# Patient Record
Sex: FEMALE | Race: WHITE
[De-identification: names, ages, dates, MRNs, and addresses within clinical notes are randomized per-mention and may not be internally consistent; named-entity substitution may affect disease eponyms.]

---

## 2017-01-24 ENCOUNTER — HOSPITAL ENCOUNTER (INPATIENT)
Dept: HOSPITAL 71 - OSEC | Age: 72
LOS: 3 days | Discharge: HOME HEALTH | DRG: 517 | End: 2017-01-27
Attending: INTERNAL MEDICINE | Admitting: INTERNAL MEDICINE
Payer: COMMERCIAL

## 2017-01-24 VITALS — RESPIRATION RATE: 18 BRPM | SYSTOLIC BLOOD PRESSURE: 160 MMHG

## 2017-01-24 VITALS — TEMPERATURE: 97.8 F | SYSTOLIC BLOOD PRESSURE: 133 MMHG | RESPIRATION RATE: 16 BRPM

## 2017-01-24 VITALS — SYSTOLIC BLOOD PRESSURE: 140 MMHG | RESPIRATION RATE: 16 BRPM | TEMPERATURE: 97.9 F

## 2017-01-24 VITALS — SYSTOLIC BLOOD PRESSURE: 165 MMHG | RESPIRATION RATE: 20 BRPM

## 2017-01-24 VITALS — RESPIRATION RATE: 18 BRPM | TEMPERATURE: 97.8 F | SYSTOLIC BLOOD PRESSURE: 156 MMHG

## 2017-01-24 VITALS — TEMPERATURE: 97.4 F | RESPIRATION RATE: 18 BRPM | SYSTOLIC BLOOD PRESSURE: 150 MMHG

## 2017-01-24 VITALS — SYSTOLIC BLOOD PRESSURE: 148 MMHG | TEMPERATURE: 97.9 F | RESPIRATION RATE: 16 BRPM

## 2017-01-24 VITALS
WEIGHT: 100 LBS | WEIGHT: 100 LBS | BODY MASS INDEX: 18.4 KG/M2 | BODY MASS INDEX: 18.4 KG/M2 | HEIGHT: 62 IN | HEIGHT: 62 IN

## 2017-01-24 VITALS — RESPIRATION RATE: 20 BRPM | SYSTOLIC BLOOD PRESSURE: 154 MMHG | TEMPERATURE: 99.6 F

## 2017-01-24 VITALS — TEMPERATURE: 99 F | RESPIRATION RATE: 16 BRPM | SYSTOLIC BLOOD PRESSURE: 133 MMHG

## 2017-01-24 VITALS — RESPIRATION RATE: 20 BRPM | SYSTOLIC BLOOD PRESSURE: 166 MMHG

## 2017-01-24 VITALS — SYSTOLIC BLOOD PRESSURE: 160 MMHG | RESPIRATION RATE: 16 BRPM

## 2017-01-24 VITALS — RESPIRATION RATE: 16 BRPM | SYSTOLIC BLOOD PRESSURE: 163 MMHG

## 2017-01-24 VITALS — SYSTOLIC BLOOD PRESSURE: 157 MMHG | RESPIRATION RATE: 20 BRPM

## 2017-01-24 VITALS — SYSTOLIC BLOOD PRESSURE: 138 MMHG | RESPIRATION RATE: 16 BRPM | TEMPERATURE: 98.3 F

## 2017-01-24 VITALS — RESPIRATION RATE: 16 BRPM

## 2017-01-24 VITALS — SYSTOLIC BLOOD PRESSURE: 133 MMHG | RESPIRATION RATE: 18 BRPM | TEMPERATURE: 98.4 F

## 2017-01-24 DIAGNOSIS — F17.210: ICD-10-CM

## 2017-01-24 DIAGNOSIS — J44.9: ICD-10-CM

## 2017-01-24 DIAGNOSIS — I10: ICD-10-CM

## 2017-01-24 DIAGNOSIS — R26.2: ICD-10-CM

## 2017-01-24 DIAGNOSIS — S82.002A: Primary | ICD-10-CM
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
## 2017-01-24 PROCEDURE — 80053 COMPREHEN METABOLIC PANEL: CPT

## 2017-01-24 PROCEDURE — 85025 COMPLETE CBC W/AUTO DIFF WBC: CPT

## 2017-01-24 PROCEDURE — 94799 UNLISTED PULMONARY SVC/PX: CPT

## 2017-01-24 PROCEDURE — 36415 COLL VENOUS BLD VENIPUNCTURE: CPT

## 2017-01-24 PROCEDURE — 0QSF04Z REPOSITION LEFT PATELLA WITH INTERNAL FIXATION DEVICE, OPEN APPROACH: ICD-10-PCS

## 2017-01-24 PROCEDURE — 76000 FLUOROSCOPY <1 HR PHYS/QHP: CPT

## 2017-01-24 PROCEDURE — 94762 N-INVAS EAR/PLS OXIMTRY CONT: CPT

## 2017-01-24 PROCEDURE — 94640 AIRWAY INHALATION TREATMENT: CPT

## 2017-01-24 RX ADMIN — MULTIPLE VITAMINS W/ MINERALS TAB SCH TAB: TAB at 14:07

## 2017-01-24 RX ADMIN — GUAIFENESIN AND DEXTROMETHORPHAN HYDROBROMIDE SCH EA: 600; 30 TABLET, EXTENDED RELEASE ORAL at 20:08

## 2017-01-24 RX ADMIN — Medication SCH ML: at 19:57

## 2017-01-24 RX ADMIN — FAMOTIDINE SCH MG: 20 TABLET ORAL at 20:08

## 2017-01-24 RX ADMIN — LEVALBUTEROL HYDROCHLORIDE SCH MG: 0.63 SOLUTION RESPIRATORY (INHALATION) at 22:16

## 2017-01-24 RX ADMIN — MORPHINE SULFATE PRN MG: 2 INJECTION, SOLUTION INTRAMUSCULAR; INTRAVENOUS at 17:22

## 2017-01-24 RX ADMIN — LEVALBUTEROL HYDROCHLORIDE SCH MG: 0.63 SOLUTION RESPIRATORY (INHALATION) at 20:40

## 2017-01-24 RX ADMIN — MORPHINE SULFATE PRN MG: 2 INJECTION, SOLUTION INTRAMUSCULAR; INTRAVENOUS at 23:06

## 2017-01-24 RX ADMIN — HYDROMORPHONE HYDROCHLORIDE PRN MG: 1 INJECTION, SOLUTION INTRAMUSCULAR; INTRAVENOUS; SUBCUTANEOUS at 13:28

## 2017-01-24 RX ADMIN — HYDROMORPHONE HYDROCHLORIDE PRN MG: 1 INJECTION, SOLUTION INTRAMUSCULAR; INTRAVENOUS; SUBCUTANEOUS at 13:46

## 2017-01-24 RX ADMIN — CEFAZOLIN SCH MLS/HR: 10 INJECTION, POWDER, FOR SOLUTION INTRAVENOUS; PARENTERAL at 23:05

## 2017-01-24 RX ADMIN — STANDARDIZED SENNA CONCENTRATE SCH MG: 8.6 TABLET ORAL at 20:08

## 2017-01-24 RX ADMIN — CEFAZOLIN SCH MLS/HR: 10 INJECTION, POWDER, FOR SOLUTION INTRAVENOUS; PARENTERAL at 17:40

## 2017-01-24 RX ADMIN — DOCUSATE SODIUM SCH MG: 100 CAPSULE, LIQUID FILLED ORAL at 20:08

## 2017-01-25 VITALS — RESPIRATION RATE: 16 BRPM | TEMPERATURE: 98.2 F | SYSTOLIC BLOOD PRESSURE: 116 MMHG

## 2017-01-25 VITALS — TEMPERATURE: 98.5 F | SYSTOLIC BLOOD PRESSURE: 119 MMHG | RESPIRATION RATE: 16 BRPM

## 2017-01-25 VITALS — TEMPERATURE: 98.1 F | RESPIRATION RATE: 16 BRPM | SYSTOLIC BLOOD PRESSURE: 123 MMHG

## 2017-01-25 VITALS — TEMPERATURE: 97.5 F | RESPIRATION RATE: 16 BRPM | SYSTOLIC BLOOD PRESSURE: 114 MMHG

## 2017-01-25 VITALS — RESPIRATION RATE: 18 BRPM | SYSTOLIC BLOOD PRESSURE: 129 MMHG | TEMPERATURE: 97.8 F

## 2017-01-25 VITALS — TEMPERATURE: 97.9 F | RESPIRATION RATE: 16 BRPM | SYSTOLIC BLOOD PRESSURE: 134 MMHG

## 2017-01-25 RX ADMIN — LEVALBUTEROL HYDROCHLORIDE SCH MG: 0.63 SOLUTION RESPIRATORY (INHALATION) at 15:26

## 2017-01-25 RX ADMIN — MAGNESIUM HYDROXIDE SCH ML: 400 SUSPENSION ORAL at 20:00

## 2017-01-25 RX ADMIN — MULTIPLE VITAMINS W/ MINERALS TAB SCH TAB: TAB at 09:14

## 2017-01-25 RX ADMIN — SODIUM CHLORIDE SCH MLS/HR: 9 INJECTION, SOLUTION INTRAVENOUS at 05:19

## 2017-01-25 RX ADMIN — STANDARDIZED SENNA CONCENTRATE SCH MG: 8.6 TABLET ORAL at 09:14

## 2017-01-25 RX ADMIN — ENOXAPARIN SODIUM SCH MG: 30 INJECTION SUBCUTANEOUS at 05:36

## 2017-01-25 RX ADMIN — DOCUSATE SODIUM SCH MG: 100 CAPSULE, LIQUID FILLED ORAL at 09:14

## 2017-01-25 RX ADMIN — STANDARDIZED SENNA CONCENTRATE SCH MG: 8.6 TABLET ORAL at 20:29

## 2017-01-25 RX ADMIN — Medication SCH ML: at 20:29

## 2017-01-25 RX ADMIN — POLYETHYLENE GLYCOL 3350 SCH GM: 17 POWDER, FOR SOLUTION ORAL at 09:13

## 2017-01-25 RX ADMIN — CEFAZOLIN SCH MLS/HR: 10 INJECTION, POWDER, FOR SOLUTION INTRAVENOUS; PARENTERAL at 11:32

## 2017-01-25 RX ADMIN — FAMOTIDINE SCH MG: 20 TABLET ORAL at 09:13

## 2017-01-25 RX ADMIN — GUAIFENESIN AND DEXTROMETHORPHAN HYDROBROMIDE SCH EA: 600; 30 TABLET, EXTENDED RELEASE ORAL at 20:29

## 2017-01-25 RX ADMIN — Medication SCH ML: at 09:15

## 2017-01-25 RX ADMIN — LEVALBUTEROL HYDROCHLORIDE SCH MG: 0.63 SOLUTION RESPIRATORY (INHALATION) at 06:59

## 2017-01-25 RX ADMIN — CEFAZOLIN SCH MLS/HR: 10 INJECTION, POWDER, FOR SOLUTION INTRAVENOUS; PARENTERAL at 05:20

## 2017-01-25 RX ADMIN — MAGNESIUM HYDROXIDE SCH ML: 400 SUSPENSION ORAL at 09:13

## 2017-01-25 RX ADMIN — LEVALBUTEROL HYDROCHLORIDE SCH MG: 0.63 SOLUTION RESPIRATORY (INHALATION) at 19:41

## 2017-01-25 RX ADMIN — DOCUSATE SODIUM SCH MG: 100 CAPSULE, LIQUID FILLED ORAL at 20:29

## 2017-01-25 RX ADMIN — GUAIFENESIN AND DEXTROMETHORPHAN HYDROBROMIDE SCH EA: 600; 30 TABLET, EXTENDED RELEASE ORAL at 09:13

## 2017-01-25 RX ADMIN — FAMOTIDINE SCH MG: 20 TABLET ORAL at 20:29

## 2017-01-26 VITALS — RESPIRATION RATE: 18 BRPM | TEMPERATURE: 98.3 F | SYSTOLIC BLOOD PRESSURE: 155 MMHG

## 2017-01-26 VITALS — RESPIRATION RATE: 18 BRPM | TEMPERATURE: 97.6 F | SYSTOLIC BLOOD PRESSURE: 145 MMHG

## 2017-01-26 VITALS — RESPIRATION RATE: 18 BRPM | TEMPERATURE: 98.4 F | SYSTOLIC BLOOD PRESSURE: 120 MMHG

## 2017-01-26 VITALS — RESPIRATION RATE: 18 BRPM | TEMPERATURE: 98.3 F | SYSTOLIC BLOOD PRESSURE: 137 MMHG

## 2017-01-26 VITALS — SYSTOLIC BLOOD PRESSURE: 134 MMHG | TEMPERATURE: 98.4 F | RESPIRATION RATE: 18 BRPM

## 2017-01-26 VITALS — SYSTOLIC BLOOD PRESSURE: 131 MMHG | TEMPERATURE: 98.2 F | RESPIRATION RATE: 16 BRPM

## 2017-01-26 RX ADMIN — GUAIFENESIN AND DEXTROMETHORPHAN HYDROBROMIDE SCH EA: 600; 30 TABLET, EXTENDED RELEASE ORAL at 19:51

## 2017-01-26 RX ADMIN — POLYETHYLENE GLYCOL 3350 SCH GM: 17 POWDER, FOR SOLUTION ORAL at 08:02

## 2017-01-26 RX ADMIN — Medication SCH ML: at 19:52

## 2017-01-26 RX ADMIN — LEVALBUTEROL HYDROCHLORIDE SCH MG: 0.63 SOLUTION RESPIRATORY (INHALATION) at 00:50

## 2017-01-26 RX ADMIN — FAMOTIDINE SCH MG: 20 TABLET ORAL at 08:02

## 2017-01-26 RX ADMIN — FAMOTIDINE SCH MG: 20 TABLET ORAL at 19:51

## 2017-01-26 RX ADMIN — SODIUM CHLORIDE SCH MLS/HR: 9 INJECTION, SOLUTION INTRAVENOUS at 06:00

## 2017-01-26 RX ADMIN — LEVALBUTEROL HYDROCHLORIDE SCH MG: 0.63 SOLUTION RESPIRATORY (INHALATION) at 23:00

## 2017-01-26 RX ADMIN — LEVALBUTEROL HYDROCHLORIDE SCH MG: 0.63 SOLUTION RESPIRATORY (INHALATION) at 06:15

## 2017-01-26 RX ADMIN — STANDARDIZED SENNA CONCENTRATE SCH MG: 8.6 TABLET ORAL at 19:52

## 2017-01-26 RX ADMIN — DOCUSATE SODIUM SCH MG: 100 CAPSULE, LIQUID FILLED ORAL at 19:52

## 2017-01-26 RX ADMIN — GUAIFENESIN AND DEXTROMETHORPHAN HYDROBROMIDE SCH EA: 600; 30 TABLET, EXTENDED RELEASE ORAL at 08:02

## 2017-01-26 RX ADMIN — MULTIPLE VITAMINS W/ MINERALS TAB SCH TAB: TAB at 08:02

## 2017-01-26 RX ADMIN — ENOXAPARIN SODIUM SCH MG: 30 INJECTION SUBCUTANEOUS at 06:23

## 2017-01-26 RX ADMIN — MAGNESIUM HYDROXIDE SCH ML: 400 SUSPENSION ORAL at 08:02

## 2017-01-26 RX ADMIN — LEVALBUTEROL HYDROCHLORIDE SCH MG: 0.63 SOLUTION RESPIRATORY (INHALATION) at 19:49

## 2017-01-26 RX ADMIN — STANDARDIZED SENNA CONCENTRATE SCH MG: 8.6 TABLET ORAL at 08:02

## 2017-01-26 RX ADMIN — MAGNESIUM HYDROXIDE SCH ML: 400 SUSPENSION ORAL at 08:04

## 2017-01-26 RX ADMIN — Medication SCH ML: at 08:04

## 2017-01-26 RX ADMIN — DOCUSATE SODIUM SCH MG: 100 CAPSULE, LIQUID FILLED ORAL at 08:02

## 2017-01-26 RX ADMIN — MAGNESIUM HYDROXIDE SCH ML: 400 SUSPENSION ORAL at 19:43

## 2017-01-26 RX ADMIN — LEVALBUTEROL HYDROCHLORIDE SCH MG: 0.63 SOLUTION RESPIRATORY (INHALATION) at 15:35

## 2017-01-27 VITALS — RESPIRATION RATE: 18 BRPM | SYSTOLIC BLOOD PRESSURE: 145 MMHG | TEMPERATURE: 98.6 F

## 2017-01-27 VITALS — SYSTOLIC BLOOD PRESSURE: 135 MMHG | RESPIRATION RATE: 18 BRPM | TEMPERATURE: 98.3 F

## 2017-01-27 VITALS — SYSTOLIC BLOOD PRESSURE: 140 MMHG | TEMPERATURE: 98 F | RESPIRATION RATE: 18 BRPM

## 2017-01-27 VITALS — TEMPERATURE: 98.3 F | SYSTOLIC BLOOD PRESSURE: 119 MMHG | RESPIRATION RATE: 18 BRPM

## 2017-01-27 RX ADMIN — DOCUSATE SODIUM SCH MG: 100 CAPSULE, LIQUID FILLED ORAL at 08:08

## 2017-01-27 RX ADMIN — Medication SCH ML: at 08:09

## 2017-01-27 RX ADMIN — FAMOTIDINE SCH MG: 20 TABLET ORAL at 08:08

## 2017-01-27 RX ADMIN — LEVALBUTEROL HYDROCHLORIDE SCH MG: 0.63 SOLUTION RESPIRATORY (INHALATION) at 15:00

## 2017-01-27 RX ADMIN — SODIUM CHLORIDE SCH MLS/HR: 9 INJECTION, SOLUTION INTRAVENOUS at 05:03

## 2017-01-27 RX ADMIN — MAGNESIUM HYDROXIDE SCH ML: 400 SUSPENSION ORAL at 08:00

## 2017-01-27 RX ADMIN — MAGNESIUM HYDROXIDE SCH ML: 400 SUSPENSION ORAL at 08:09

## 2017-01-27 RX ADMIN — STANDARDIZED SENNA CONCENTRATE SCH MG: 8.6 TABLET ORAL at 08:08

## 2017-01-27 RX ADMIN — LEVALBUTEROL HYDROCHLORIDE SCH MG: 0.63 SOLUTION RESPIRATORY (INHALATION) at 06:42

## 2017-01-27 RX ADMIN — POLYETHYLENE GLYCOL 3350 SCH GM: 17 POWDER, FOR SOLUTION ORAL at 08:08

## 2017-01-27 RX ADMIN — MULTIPLE VITAMINS W/ MINERALS TAB SCH TAB: TAB at 08:08

## 2017-01-27 RX ADMIN — GUAIFENESIN AND DEXTROMETHORPHAN HYDROBROMIDE SCH EA: 600; 30 TABLET, EXTENDED RELEASE ORAL at 08:08

## 2017-01-27 RX ADMIN — ENOXAPARIN SODIUM SCH MG: 30 INJECTION SUBCUTANEOUS at 05:07

## 2019-03-27 ENCOUNTER — HOSPITAL ENCOUNTER (OUTPATIENT)
Dept: OTHER | Facility: HOSPITAL | Age: 74
Discharge: HOME | End: 2019-03-27
Attending: INTERNAL MEDICINE

## 2019-03-27 LAB
ALBUMIN SERPL-MCNC: 4.8 G/DL (ref 3.5–5)
ALBUMIN/GLOB SERPL: 2.3 {RATIO} (ref 1.4–2.6)
ALP SERPL-CCNC: 82 U/L (ref 43–160)
ALT SERPL-CCNC: 13 U/L (ref 10–40)
ANION GAP SERPL CALC-SCNC: 18 MMOL/L (ref 8–19)
AST SERPL-CCNC: 25 U/L (ref 15–50)
BILIRUB SERPL-MCNC: 0.44 MG/DL (ref 0.2–1.3)
BUN SERPL-MCNC: 9 MG/DL (ref 5–25)
BUN/CREAT SERPL: 10 {RATIO} (ref 6–20)
CALCIUM SERPL-MCNC: 9.3 MG/DL (ref 8.7–10.4)
CHLORIDE SERPL-SCNC: 104 MMOL/L (ref 99–111)
CK SERPL-CCNC: 47 U/L (ref 35–230)
CONV CO2: 25 MMOL/L (ref 22–32)
CONV TOTAL PROTEIN: 6.9 G/DL (ref 6.3–8.2)
CREAT UR-MCNC: 0.93 MG/DL (ref 0.5–0.9)
GFR SERPLBLD BASED ON 1.73 SQ M-ARVRAT: >60 ML/MIN/{1.73_M2}
GLOBULIN UR ELPH-MCNC: 2.1 G/DL (ref 2–3.5)
GLUCOSE SERPL-MCNC: 105 MG/DL (ref 65–99)
OSMOLALITY SERPL CALC.SUM OF ELEC: 295 MOSM/KG (ref 273–304)
POTASSIUM SERPL-SCNC: 4.3 MMOL/L (ref 3.5–5.3)
SODIUM SERPL-SCNC: 143 MMOL/L (ref 135–147)

## 2022-05-16 ENCOUNTER — TRANSCRIBE ORDERS (OUTPATIENT)
Dept: ADMINISTRATIVE | Facility: HOSPITAL | Age: 77
End: 2022-05-16

## 2022-05-16 DIAGNOSIS — M81.0 SENILE OSTEOPOROSIS: Primary | ICD-10-CM

## 2022-09-02 ENCOUNTER — APPOINTMENT (OUTPATIENT)
Dept: BONE DENSITY | Facility: HOSPITAL | Age: 77
End: 2022-09-02

## 2025-04-08 ENCOUNTER — HOSPITAL ENCOUNTER (INPATIENT)
Facility: HOSPITAL | Age: 80
LOS: 2 days | Discharge: HOME | End: 2025-04-10
Attending: EMERGENCY MEDICINE | Admitting: INTERNAL MEDICINE
Payer: MEDICARE

## 2025-04-08 ENCOUNTER — APPOINTMENT (OUTPATIENT)
Dept: GENERAL RADIOLOGY | Facility: HOSPITAL | Age: 80
End: 2025-04-08
Payer: MEDICARE

## 2025-04-08 DIAGNOSIS — I21.3 ST ELEVATION MYOCARDIAL INFARCTION (STEMI), UNSPECIFIED ARTERY: Primary | ICD-10-CM

## 2025-04-08 LAB
ACT BLD: 429 SECONDS (ref 89–137)
ALBUMIN SERPL-MCNC: 4.3 G/DL (ref 3.5–5.2)
ALBUMIN/GLOB SERPL: 1.7 G/DL
ALP SERPL-CCNC: 77 U/L (ref 39–117)
ALT SERPL W P-5'-P-CCNC: 13 U/L (ref 1–33)
ANION GAP SERPL CALCULATED.3IONS-SCNC: 13.8 MMOL/L (ref 5–15)
AST SERPL-CCNC: 25 U/L (ref 1–32)
BASOPHILS # BLD AUTO: 0.03 10*3/MM3 (ref 0–0.2)
BASOPHILS NFR BLD AUTO: 0.4 % (ref 0–1.5)
BILIRUB SERPL-MCNC: 0.5 MG/DL (ref 0–1.2)
BUN SERPL-MCNC: 14 MG/DL (ref 8–23)
BUN/CREAT SERPL: 18.4 (ref 7–25)
CALCIUM SPEC-SCNC: 9.3 MG/DL (ref 8.6–10.5)
CHLORIDE SERPL-SCNC: 102 MMOL/L (ref 98–107)
CO2 SERPL-SCNC: 22.2 MMOL/L (ref 22–29)
CREAT SERPL-MCNC: 0.76 MG/DL (ref 0.57–1)
DEPRECATED RDW RBC AUTO: 53.7 FL (ref 37–54)
EGFRCR SERPLBLD CKD-EPI 2021: 78.8 ML/MIN/1.73
EOSINOPHIL # BLD AUTO: 0.08 10*3/MM3 (ref 0–0.4)
EOSINOPHIL NFR BLD AUTO: 1.1 % (ref 0.3–6.2)
ERYTHROCYTE [DISTWIDTH] IN BLOOD BY AUTOMATED COUNT: 14.7 % (ref 12.3–15.4)
GEN 5 1HR TROPONIN T REFLEX: 383 NG/L
GLOBULIN UR ELPH-MCNC: 2.6 GM/DL
GLUCOSE SERPL-MCNC: 131 MG/DL (ref 65–99)
HBA1C MFR BLD: 5.6 % (ref 4.8–5.6)
HCT VFR BLD AUTO: 43.1 % (ref 34–46.6)
HGB BLD-MCNC: 14 G/DL (ref 12–15.9)
HOLD SPECIMEN: NORMAL
HOLD SPECIMEN: NORMAL
IMM GRANULOCYTES # BLD AUTO: 0.03 10*3/MM3 (ref 0–0.05)
IMM GRANULOCYTES NFR BLD AUTO: 0.4 % (ref 0–0.5)
LIPASE SERPL-CCNC: 25 U/L (ref 13–60)
LYMPHOCYTES # BLD AUTO: 2.19 10*3/MM3 (ref 0.7–3.1)
LYMPHOCYTES NFR BLD AUTO: 29 % (ref 19.6–45.3)
MAGNESIUM SERPL-MCNC: 2 MG/DL (ref 1.6–2.4)
MCH RBC QN AUTO: 32 PG (ref 26.6–33)
MCHC RBC AUTO-ENTMCNC: 32.5 G/DL (ref 31.5–35.7)
MCV RBC AUTO: 98.4 FL (ref 79–97)
MONOCYTES # BLD AUTO: 0.75 10*3/MM3 (ref 0.1–0.9)
MONOCYTES NFR BLD AUTO: 9.9 % (ref 5–12)
NEUTROPHILS NFR BLD AUTO: 4.48 10*3/MM3 (ref 1.7–7)
NEUTROPHILS NFR BLD AUTO: 59.2 % (ref 42.7–76)
NRBC BLD AUTO-RTO: 0 /100 WBC (ref 0–0.2)
NT-PROBNP SERPL-MCNC: 961.3 PG/ML (ref 0–1800)
PLATELET # BLD AUTO: 216 10*3/MM3 (ref 140–450)
PMV BLD AUTO: 10.6 FL (ref 6–12)
POTASSIUM SERPL-SCNC: 3.9 MMOL/L (ref 3.5–5.2)
PROT SERPL-MCNC: 6.9 G/DL (ref 6–8.5)
QT INTERVAL: 391 MS
QT INTERVAL: 446 MS
QTC INTERVAL: 410 MS
QTC INTERVAL: 504 MS
RBC # BLD AUTO: 4.38 10*6/MM3 (ref 3.77–5.28)
SODIUM SERPL-SCNC: 138 MMOL/L (ref 136–145)
T-UPTAKE NFR SERPL: 1.14 TBI (ref 0.8–1.3)
T4 SERPL-MCNC: 8.31 MCG/DL (ref 4.5–11.7)
TROPONIN T % DELTA: 195
TROPONIN T NUMERIC DELTA: 253 NG/L
TROPONIN T SERPL HS-MCNC: 130 NG/L
TSH SERPL DL<=0.05 MIU/L-ACNC: 1.36 UIU/ML (ref 0.27–4.2)
WBC NRBC COR # BLD AUTO: 7.56 10*3/MM3 (ref 3.4–10.8)
WHOLE BLOOD HOLD COAG: NORMAL
WHOLE BLOOD HOLD SPECIMEN: NORMAL

## 2025-04-08 PROCEDURE — 4A023N7 MEASUREMENT OF CARDIAC SAMPLING AND PRESSURE, LEFT HEART, PERCUTANEOUS APPROACH: ICD-10-PCS | Performed by: INTERNAL MEDICINE

## 2025-04-08 PROCEDURE — 99222 1ST HOSP IP/OBS MODERATE 55: CPT | Performed by: INTERNAL MEDICINE

## 2025-04-08 PROCEDURE — C1894 INTRO/SHEATH, NON-LASER: HCPCS | Performed by: INTERNAL MEDICINE

## 2025-04-08 PROCEDURE — 94799 UNLISTED PULMONARY SVC/PX: CPT

## 2025-04-08 PROCEDURE — C1887 CATHETER, GUIDING: HCPCS | Performed by: INTERNAL MEDICINE

## 2025-04-08 PROCEDURE — C1725 CATH, TRANSLUMIN NON-LASER: HCPCS | Performed by: INTERNAL MEDICINE

## 2025-04-08 PROCEDURE — 93458 L HRT ARTERY/VENTRICLE ANGIO: CPT | Performed by: INTERNAL MEDICINE

## 2025-04-08 PROCEDURE — 85347 COAGULATION TIME ACTIVATED: CPT

## 2025-04-08 PROCEDURE — 36415 COLL VENOUS BLD VENIPUNCTURE: CPT

## 2025-04-08 PROCEDURE — C9606 PERC D-E COR REVASC W AMI S: HCPCS | Performed by: INTERNAL MEDICINE

## 2025-04-08 PROCEDURE — 99291 CRITICAL CARE FIRST HOUR: CPT | Performed by: INTERNAL MEDICINE

## 2025-04-08 PROCEDURE — 25010000002 FENTANYL CITRATE (PF) 50 MCG/ML SOLUTION: Performed by: EMERGENCY MEDICINE

## 2025-04-08 PROCEDURE — 25010000002 HEPARIN (PORCINE) PER 1000 UNITS: Performed by: EMERGENCY MEDICINE

## 2025-04-08 PROCEDURE — 25010000002 MIDAZOLAM PER 1MG: Performed by: INTERNAL MEDICINE

## 2025-04-08 PROCEDURE — 99152 MOD SED SAME PHYS/QHP 5/>YRS: CPT | Performed by: INTERNAL MEDICINE

## 2025-04-08 PROCEDURE — 25010000002 FENTANYL CITRATE (PF) 50 MCG/ML SOLUTION: Performed by: INTERNAL MEDICINE

## 2025-04-08 PROCEDURE — C1769 GUIDE WIRE: HCPCS | Performed by: INTERNAL MEDICINE

## 2025-04-08 PROCEDURE — 93005 ELECTROCARDIOGRAM TRACING: CPT | Performed by: INTERNAL MEDICINE

## 2025-04-08 PROCEDURE — 83690 ASSAY OF LIPASE: CPT | Performed by: EMERGENCY MEDICINE

## 2025-04-08 PROCEDURE — 25010000002 ONDANSETRON PER 1 MG: Performed by: EMERGENCY MEDICINE

## 2025-04-08 PROCEDURE — C1874 STENT, COATED/COV W/DEL SYS: HCPCS | Performed by: INTERNAL MEDICINE

## 2025-04-08 PROCEDURE — 93005 ELECTROCARDIOGRAM TRACING: CPT | Performed by: EMERGENCY MEDICINE

## 2025-04-08 PROCEDURE — 84484 ASSAY OF TROPONIN QUANT: CPT | Performed by: EMERGENCY MEDICINE

## 2025-04-08 PROCEDURE — 25010000002 FENTANYL CITRATE (PF) 100 MCG/2ML SOLUTION: Performed by: INTERNAL MEDICINE

## 2025-04-08 PROCEDURE — 25010000002 BIVALIRUDIN TRIFLUOROACETATE 250 MG RECONSTITUTED SOLUTION 1 EACH VIAL: Performed by: INTERNAL MEDICINE

## 2025-04-08 PROCEDURE — 84443 ASSAY THYROID STIM HORMONE: CPT | Performed by: PHYSICIAN ASSISTANT

## 2025-04-08 PROCEDURE — 85025 COMPLETE CBC W/AUTO DIFF WBC: CPT | Performed by: EMERGENCY MEDICINE

## 2025-04-08 PROCEDURE — B2111ZZ FLUOROSCOPY OF MULTIPLE CORONARY ARTERIES USING LOW OSMOLAR CONTRAST: ICD-10-PCS | Performed by: INTERNAL MEDICINE

## 2025-04-08 PROCEDURE — 71045 X-RAY EXAM CHEST 1 VIEW: CPT

## 2025-04-08 PROCEDURE — 99153 MOD SED SAME PHYS/QHP EA: CPT | Performed by: INTERNAL MEDICINE

## 2025-04-08 PROCEDURE — 83880 ASSAY OF NATRIURETIC PEPTIDE: CPT | Performed by: EMERGENCY MEDICINE

## 2025-04-08 PROCEDURE — 92941 PRQ TRLML REVSC TOT OCCL AMI: CPT | Performed by: INTERNAL MEDICINE

## 2025-04-08 PROCEDURE — 84484 ASSAY OF TROPONIN QUANT: CPT | Performed by: INTERNAL MEDICINE

## 2025-04-08 PROCEDURE — 84436 ASSAY OF TOTAL THYROXINE: CPT | Performed by: PHYSICIAN ASSISTANT

## 2025-04-08 PROCEDURE — 99291 CRITICAL CARE FIRST HOUR: CPT

## 2025-04-08 PROCEDURE — B2151ZZ FLUOROSCOPY OF LEFT HEART USING LOW OSMOLAR CONTRAST: ICD-10-PCS | Performed by: INTERNAL MEDICINE

## 2025-04-08 PROCEDURE — 83735 ASSAY OF MAGNESIUM: CPT | Performed by: EMERGENCY MEDICINE

## 2025-04-08 PROCEDURE — 25510000001 IOPAMIDOL PER 1 ML: Performed by: INTERNAL MEDICINE

## 2025-04-08 PROCEDURE — 80053 COMPREHEN METABOLIC PANEL: CPT | Performed by: EMERGENCY MEDICINE

## 2025-04-08 PROCEDURE — 84479 ASSAY OF THYROID (T3 OR T4): CPT | Performed by: PHYSICIAN ASSISTANT

## 2025-04-08 PROCEDURE — 25810000003 SODIUM CHLORIDE 0.9 % SOLUTION: Performed by: INTERNAL MEDICINE

## 2025-04-08 PROCEDURE — 93005 ELECTROCARDIOGRAM TRACING: CPT

## 2025-04-08 PROCEDURE — 83036 HEMOGLOBIN GLYCOSYLATED A1C: CPT | Performed by: PHYSICIAN ASSISTANT

## 2025-04-08 PROCEDURE — 25010000002 FUROSEMIDE PER 20 MG: Performed by: INTERNAL MEDICINE

## 2025-04-08 PROCEDURE — 027034Z DILATION OF CORONARY ARTERY, ONE ARTERY WITH DRUG-ELUTING INTRALUMINAL DEVICE, PERCUTANEOUS APPROACH: ICD-10-PCS | Performed by: INTERNAL MEDICINE

## 2025-04-08 DEVICE — XIENCE SKYPOINT™ EVEROLIMUS ELUTING CORONARY STENT SYSTEM 3.00 MM X 33 MM / RAPID-EXCHANGE
Type: IMPLANTABLE DEVICE | Status: FUNCTIONAL
Brand: XIENCE SKYPOINT™

## 2025-04-08 RX ORDER — BUDESONIDE 0.5 MG/2ML
0.5 INHALANT ORAL
Status: DISCONTINUED | OUTPATIENT
Start: 2025-04-08 | End: 2025-04-10 | Stop reason: HOSPADM

## 2025-04-08 RX ORDER — VERAPAMIL HYDROCHLORIDE 2.5 MG/ML
INJECTION, SOLUTION INTRAVENOUS
Status: DISCONTINUED | OUTPATIENT
Start: 2025-04-08 | End: 2025-04-08 | Stop reason: HOSPADM

## 2025-04-08 RX ORDER — ACETAMINOPHEN 325 MG/1
650 TABLET ORAL EVERY 4 HOURS PRN
Status: DISCONTINUED | OUTPATIENT
Start: 2025-04-08 | End: 2025-04-10 | Stop reason: HOSPADM

## 2025-04-08 RX ORDER — SODIUM CHLORIDE 0.9 % (FLUSH) 0.9 %
10 SYRINGE (ML) INJECTION AS NEEDED
Status: DISCONTINUED | OUTPATIENT
Start: 2025-04-08 | End: 2025-04-10 | Stop reason: HOSPADM

## 2025-04-08 RX ORDER — NALOXONE HCL 0.4 MG/ML
0.4 VIAL (ML) INJECTION
Status: DISCONTINUED | OUTPATIENT
Start: 2025-04-08 | End: 2025-04-10 | Stop reason: HOSPADM

## 2025-04-08 RX ORDER — ATORVASTATIN CALCIUM 10 MG/1
TABLET, FILM COATED ORAL
Status: COMPLETED | OUTPATIENT
Start: 2025-04-08 | End: 2025-04-08

## 2025-04-08 RX ORDER — SODIUM CHLORIDE 9 MG/ML
100 INJECTION, SOLUTION INTRAVENOUS CONTINUOUS
Status: ACTIVE | OUTPATIENT
Start: 2025-04-08 | End: 2025-04-08

## 2025-04-08 RX ORDER — ASPIRIN 81 MG/1
324 TABLET, CHEWABLE ORAL ONCE
Status: COMPLETED | OUTPATIENT
Start: 2025-04-08 | End: 2025-04-08

## 2025-04-08 RX ORDER — ONDANSETRON 2 MG/ML
4 INJECTION INTRAMUSCULAR; INTRAVENOUS EVERY 6 HOURS PRN
Status: DISCONTINUED | OUTPATIENT
Start: 2025-04-08 | End: 2025-04-10 | Stop reason: HOSPADM

## 2025-04-08 RX ORDER — IPRATROPIUM BROMIDE AND ALBUTEROL SULFATE 2.5; .5 MG/3ML; MG/3ML
3 SOLUTION RESPIRATORY (INHALATION)
Status: DISCONTINUED | OUTPATIENT
Start: 2025-04-08 | End: 2025-04-09

## 2025-04-08 RX ORDER — FENTANYL CITRATE 50 UG/ML
INJECTION, SOLUTION INTRAMUSCULAR; INTRAVENOUS
Status: DISCONTINUED | OUTPATIENT
Start: 2025-04-08 | End: 2025-04-08 | Stop reason: HOSPADM

## 2025-04-08 RX ORDER — AMLODIPINE BESYLATE 5 MG/1
5 TABLET ORAL DAILY
COMMUNITY
Start: 2025-02-20

## 2025-04-08 RX ORDER — ROSUVASTATIN CALCIUM 20 MG/1
20 TABLET, COATED ORAL NIGHTLY
Status: DISCONTINUED | OUTPATIENT
Start: 2025-04-08 | End: 2025-04-10 | Stop reason: HOSPADM

## 2025-04-08 RX ORDER — IBANDRONATE SODIUM 150 MG/1
150 TABLET, FILM COATED ORAL
COMMUNITY
Start: 2025-02-20

## 2025-04-08 RX ORDER — NITROGLYCERIN 0.4 MG/1
0.4 TABLET SUBLINGUAL
Status: DISCONTINUED | OUTPATIENT
Start: 2025-04-08 | End: 2025-04-10 | Stop reason: HOSPADM

## 2025-04-08 RX ORDER — MIDAZOLAM HYDROCHLORIDE 2 MG/2ML
INJECTION, SOLUTION INTRAMUSCULAR; INTRAVENOUS
Status: DISCONTINUED | OUTPATIENT
Start: 2025-04-08 | End: 2025-04-08 | Stop reason: HOSPADM

## 2025-04-08 RX ORDER — HEPARIN SODIUM 5000 [USP'U]/ML
60 INJECTION, SOLUTION INTRAVENOUS; SUBCUTANEOUS ONCE
Status: COMPLETED | OUTPATIENT
Start: 2025-04-08 | End: 2025-04-08

## 2025-04-08 RX ORDER — ONDANSETRON 4 MG/1
4 TABLET, ORALLY DISINTEGRATING ORAL EVERY 6 HOURS PRN
Status: DISCONTINUED | OUTPATIENT
Start: 2025-04-08 | End: 2025-04-10 | Stop reason: HOSPADM

## 2025-04-08 RX ORDER — FUROSEMIDE 10 MG/ML
INJECTION INTRAMUSCULAR; INTRAVENOUS
Status: DISCONTINUED | OUTPATIENT
Start: 2025-04-08 | End: 2025-04-08 | Stop reason: HOSPADM

## 2025-04-08 RX ORDER — IOPAMIDOL 755 MG/ML
INJECTION, SOLUTION INTRAVASCULAR
Status: DISCONTINUED | OUTPATIENT
Start: 2025-04-08 | End: 2025-04-08 | Stop reason: HOSPADM

## 2025-04-08 RX ORDER — ONDANSETRON 2 MG/ML
INJECTION INTRAMUSCULAR; INTRAVENOUS
Status: COMPLETED | OUTPATIENT
Start: 2025-04-08 | End: 2025-04-08

## 2025-04-08 RX ORDER — NICOTINE 21 MG/24HR
1 PATCH, TRANSDERMAL 24 HOURS TRANSDERMAL
Status: DISCONTINUED | OUTPATIENT
Start: 2025-04-08 | End: 2025-04-10 | Stop reason: HOSPADM

## 2025-04-08 RX ORDER — FENTANYL CITRATE 50 UG/ML
50 INJECTION, SOLUTION INTRAMUSCULAR; INTRAVENOUS ONCE
Refills: 0 | Status: COMPLETED | OUTPATIENT
Start: 2025-04-08 | End: 2025-04-08

## 2025-04-08 RX ORDER — CHOLECALCIFEROL (VITAMIN D3) 25 MCG
1000 TABLET ORAL DAILY
COMMUNITY

## 2025-04-08 RX ORDER — ASPIRIN 81 MG/1
81 TABLET ORAL DAILY
Status: DISCONTINUED | OUTPATIENT
Start: 2025-04-08 | End: 2025-04-10 | Stop reason: HOSPADM

## 2025-04-08 RX ORDER — ARFORMOTEROL TARTRATE 15 UG/2ML
15 SOLUTION RESPIRATORY (INHALATION)
Status: DISCONTINUED | OUTPATIENT
Start: 2025-04-08 | End: 2025-04-10 | Stop reason: HOSPADM

## 2025-04-08 RX ORDER — MORPHINE SULFATE 2 MG/ML
1 INJECTION, SOLUTION INTRAMUSCULAR; INTRAVENOUS EVERY 4 HOURS PRN
Status: ACTIVE | OUTPATIENT
Start: 2025-04-08 | End: 2025-04-09

## 2025-04-08 RX ORDER — DOCUSATE SODIUM 100 MG/1
100 CAPSULE, LIQUID FILLED ORAL DAILY
COMMUNITY

## 2025-04-08 RX ORDER — METOPROLOL SUCCINATE 25 MG/1
12.5 TABLET, EXTENDED RELEASE ORAL DAILY
Status: DISCONTINUED | OUTPATIENT
Start: 2025-04-08 | End: 2025-04-10 | Stop reason: HOSPADM

## 2025-04-08 RX ORDER — ALBUTEROL SULFATE 90 UG/1
2 POWDER, METERED RESPIRATORY (INHALATION) EVERY 6 HOURS PRN
COMMUNITY

## 2025-04-08 RX ADMIN — ASPIRIN 324 MG: 81 TABLET, CHEWABLE ORAL at 15:20

## 2025-04-08 RX ADMIN — HEPARIN SODIUM 2600 UNITS: 5000 INJECTION INTRAVENOUS; SUBCUTANEOUS at 15:20

## 2025-04-08 RX ADMIN — ROSUVASTATIN CALCIUM 20 MG: 20 TABLET, FILM COATED ORAL at 22:19

## 2025-04-08 RX ADMIN — ARFORMOTEROL TARTRATE 15 MCG: 15 SOLUTION RESPIRATORY (INHALATION) at 19:12

## 2025-04-08 RX ADMIN — ATORVASTATIN CALCIUM 40 MG: 40 TABLET, FILM COATED ORAL at 15:28

## 2025-04-08 RX ADMIN — FENTANYL CITRATE 50 MCG: 50 INJECTION, SOLUTION INTRAMUSCULAR; INTRAVENOUS at 15:25

## 2025-04-08 RX ADMIN — NICOTINE 1 PATCH: 21 PATCH, EXTENDED RELEASE TRANSDERMAL at 18:27

## 2025-04-08 RX ADMIN — TICAGRELOR 90 MG: 90 TABLET ORAL at 22:19

## 2025-04-08 RX ADMIN — BUDESONIDE 0.5 MG: 0.5 SUSPENSION RESPIRATORY (INHALATION) at 19:13

## 2025-04-08 RX ADMIN — NITROGLYCERIN 1 INCH: 20 OINTMENT TOPICAL at 15:23

## 2025-04-08 RX ADMIN — IPRATROPIUM BROMIDE AND ALBUTEROL SULFATE 3 ML: .5; 3 SOLUTION RESPIRATORY (INHALATION) at 19:13

## 2025-04-08 RX ADMIN — ONDANSETRON 4 MG: 2 INJECTION INTRAMUSCULAR; INTRAVENOUS at 15:24

## 2025-04-08 RX ADMIN — METOPROLOL SUCCINATE 12.5 MG: 25 TABLET, FILM COATED, EXTENDED RELEASE ORAL at 18:27

## 2025-04-08 RX ADMIN — ASPIRIN 81 MG: 81 TABLET, COATED ORAL at 18:27

## 2025-04-08 RX ADMIN — SODIUM CHLORIDE 100 ML/HR: 9 INJECTION, SOLUTION INTRAVENOUS at 18:52

## 2025-04-08 RX ADMIN — ONDANSETRON 4 MG: 2 INJECTION INTRAMUSCULAR; INTRAVENOUS at 15:25

## 2025-04-08 NOTE — H&P
[facility]   CARDIOLOGY HISTORY AND PHYSICAL    Patient Name: Yanni Vega  : 1945  MRN: [id number]  Primary Care Physician:  Carmen Ayon MD  Date of admission: 2025    Subjective   Subjective     Chief Complaint: Chest pain/acute inferior wall ST elevation myocardial infarction    HPI:    Yanni Vega is a 80 y.o. female with past medical history of mixed hyperlipidemia, COPD and essential hypertension.  Is an active smoker about 1 pack/day for over 60 years.  The patient was in her usual state of health until 4 days ago when she developed chest discomfort associated with shortness of breath.  The pain was moderate in intensity and at times with some relief.  She had her granddaughters visit and complaining of more severe chest pain on Saturday and again today was very severe.  Patient was brought to the emergency room where an EKG showed acute ST elevation in the inferior lateral leads.  Pain was severe and associated with shortness of breath.  Sieve heparin 5000 and is intravenously, aspirin and nitroglycerin.  Blood pressure was transiently low and was given IV fluids.    Review of Systems   All systems were reviewed and negative except for: Chest pain and dyspnea    Personal History     Past Medical History:   Diagnosis Date    Arthritis     COPD (chronic obstructive pulmonary disease)     Emphysema lung     Hypertension     Osteoporosis            Family History: Family history is unknown by patient. Otherwise pertinent FHx was reviewed and not pertinent to current issue.    Social History:  reports that she has been smoking. She has a 25 pack-year smoking history. She has never used smokeless tobacco. She reports current alcohol use of about 1.0 standard drink of alcohol per week.    Home Medications:  Fluticasone-Umeclidin-Vilant and alendronate      Allergies:  Allergies   Allergen Reactions    Iodine Anaphylaxis    Lisinopril Swelling       Objective   Objective     Vitals:    Temp:  [97.3 °F (36.3 °C)] 97.3 °F (36.3 °C)  Heart Rate:  [62-78] 65  Resp:  [19] 19  BP: ()/(57-68) 97/57  Flow (L/min) (Oxygen Therapy):  [2] 2  Physical Exam    Constitutional: Awake, alert   Eyes: PERRLA, sclerae anicteric, no conjunctival injection   HENT: NCAT, mucous membranes moist   Neck: Supple, no thyromegaly, no lymphadenopathy, trachea midline   Respiratory: Clear to auscultation bilaterally, nonlabored respirations    Cardiovascular: RRR, no murmurs, rubs, or gallops, palpable pedal pulses bilaterally   Gastrointestinal: Positive bowel sounds, soft, nontender, nondistended   Musculoskeletal: No bilateral ankle edema, no clubbing or cyanosis to extremities   Psychiatric: Appropriate affect, cooperative   Neurologic: Oriented x 3, strength symmetric in all extremities, Cranial Nerves grossly intact to confrontation, speech clear   Skin: No rashes     Result Review    Result Review:  I have personally reviewed the results from the time of this admission to 4/8/2025 16:41 EDT and agree with these findings:  [x]  Laboratory  []  Microbiology  [x]  Radiology  [x]  EKG/Telemetry   [x]  Cardiology/Vascular   []  Pathology  [x]  Old records  []  Other:  Most notable findings include: Abnormal EKG without acute ST elevation in the inferior lateral leads.    Assessment & Plan   Assessment / Plan     Brief Patient Summary:  Yanni Vega is a 80 y.o. female who with acute inferior wall ST elevation myocardial infarction.  The patient have intermittent episode of chest pain since Saturday Asim was worsened today associated with shortness of breath.    Active Hospital Problems:  Active Hospital Problems    Diagnosis     **STEMI (ST elevation myocardial infarction)          Plan:   The patient was brought for an emergent coronary angiography and revascularization.  Risk and benefit of the procedure were discussed in detail including the chance of myocardial infarction, stroke, vascular complications and  contrast-induced nephropathy among others.  Received aspirin and heparin.  He had a emergent procedure which showed 100% occlusion of the mid large right coronary artery with thrombus.  Left main have mild disease with calcification.  The left anterior descending has severe proximal stenosis involving the first diagonal branch was large about 85%.  The mid LAD beyond the diagonal branch have 75% stenosis.  Circumflex artery have an 80% ostial stenosis.  The LVEDP was 22 mmHg gradient across the aortic valve on pullback.  Mitral valve was heavily calcified.  Aortic valve was calcified.  The EF was 65% with hypokinesis of the inferior wall.  PCI of the RCA with drug-eluting stent 3.0 33 mm Xience stent with excellent results, post CATRACHITA-3 flow and no residual stenosis of the stented vessel.  RCA have mild stenosis.  Is to continue with dual antiplatelet therapy with aspirin 81 mg oral daily, ticagrelor 90 m oral twice a day for minimum of 1 year.  Initiate low-dose beta-blockers as tolerated.  Will initiate rosuvastatin 20 minutes oral daily.  Patient will be monitored in the intensive care unit for arrhythmias and for heart failure.  Will obtain a 2D echocardiogram in the morning to assess wall motion and mitral valve insufficiency.  Will consider resection of the LAD and circumflex artery, CABG versus high risk PCI in the future.    VTE Prophylaxis:  Pharmacologic VTE prophylaxis orders are present.        CODE STATUS:       Admission Status:  I believe this patient meets inpatient status.    Electronically signed by Rinku Guy MD, 04/08/25, 4:41 PM EDT.

## 2025-04-08 NOTE — Clinical Note
First balloon inflation max pressure = 18 breanne. First balloon inflation duration = 10 seconds. Second inflation of balloon - Max pressure = 20 breanne. 2nd Inflation of balloon - Duration = 10 seconds.

## 2025-04-08 NOTE — Clinical Note
Dad said Chavez is very congested and has a fever wanted to know if there is anything he can do.   Just started  last Tue and was at a party Thursday with cousin who has a cough. Last wet diaper little bit ago. Eating as per normal, behavior normal, Dad denies respiratory distress as explained and understanding verbalized (fast breathing, retractions, nasal flaring).    Parent instructed to use nasal saline, humidifier, Pedialyte with or without formula, Vicks VapoRub, to elevate head of bed, and not to use OTC (over the counter). Dad encouraged to call if symptoms worsen, respiratory distress, fever, less than 1 wet diaper every 6-8 hours, lethargy,  persist or with any other questions or concerns. Dad  verbalizes understanding and has no further questions.    The left coronary artery was selectively engaged, injected and visualized. Detail Level: Detailed Add 44326 Cpt? (Important Note: In 2017 The Use Of 48028 Is Being Tracked By Cms To Determine Future Global Period Reimbursement For Global Periods): yes

## 2025-04-08 NOTE — PROGRESS NOTES
RT EQUIPMENT DEVICE RELATED - SKIN ASSESSMENT    Respiratory Therapist Broncho-Pulmonary Hygiene Progress Note      Patient Name:  Yanni Vega  YOB: 1945    Yanni Vega meets the qualification for Level 1 of the Bronco-Pulmonary Hygiene Protocol. This was based on my daily patient assessment and includes review of chest x-ray results, cough ability and quality, oxygenation, secretions or risk for secretion development and patient mobility.     Broncho-Pulmonary Hygiene Assessment:    Level of Movement: Actively changing positions without assistance  Alert/ oriented/ cooperative    Breath Sounds: Clear to slightly diminished    Cough: Strong, effective    Chest X-Ray: Possible signs of consolidation and/or atelectasis or clear.     Sputum Productions: Able to produce small to moderate amount, of moderately thick secretions    History and Physical: None    SpO2 to Oxygen Need: greater than 92% on room air or  less than 3L nasal canula    Current SpO2 is: 97 on room air.     Based on this information, I have completed the following interventions: Aerobika with bronchodialtor medication or TID      Electronically signed by Lexus Odom RRT, 04/08/25, 7:23 PM EDT.        Lexus Odom RRT

## 2025-04-08 NOTE — CONSULTS
Pulmonary / Critical Care Consult Note      Patient Name: Yanni Vega  : 1945  MRN: [id number]  Primary Care Physician:  Carmen Ayon MD  Referring Physician: No Known Provider  Date of admission: 2025    Subjective   Subjective     Reason for Consult/ Chief Complaint: Acute inferior wall MI status post PCI, postprocedure ICU care    HPI:  Yanni Vega is a 80 y.o. female with history of hyperlipidemia, COPD, hypertension, chronic smoking for many years, who presented to the hospital with chest discomfort and shortness of breath for few days.  EKG was done in the ER, she was found to have ST elevation in inferior leads in the ER.  Patient was given heparin, aspirin and nitroglycerin.  Blood pressure was transiently low and was given IV fluids.  Patient was taken to Cath Lab, PCI was done.  Patient was found to have 100% occlusion of the mid large right coronary artery with thrombus.  Patient had drug-eluting RCA stent and is transferred to ICU postprocedure.  Pulmonary critical services involved for assistance with management of her acute issues.  She is a chronic smoker, smoking more than a pack a day for more than 60 years.  She has shortness of breath with exertion on daily basis.  She is on inhalers at home.  She has no history of diabetes or high cholesterol but has a history of hypertension.    Review of Systems  General:  Fatigue, No Fever  HEENT: No dysphagia, No Visual Changes, no rhinorrhea  Respiratory:  No cough,+Dyspnea, No Pleuritic Pain, no wheezing, no hemoptysis  Cardiovascular: Chest pain, denies palpitations,+JOHNS, Chest Pressure  Gastrointestinal:  No Abdominal Pain, No Nausea, No Vomiting, No Diarrhea  Genitourinary:  No Dysuria, No Frequency, No Hesitancy  Musculoskeletal: No muscle pain or swelling  Endocrine:  No Heat Intolerance, No Cold Intolerance, Fatigue  Hematologic:  No Bleeding, No Bruising  Psychiatric:  No Anxiety, No Depression  Neurologic:  No Confusion, no  Dysarthria, No Headaches  Skin:  No Rash, No Open Wounds        Personal History     Past Medical History:   Diagnosis Date    Arthritis     COPD (chronic obstructive pulmonary disease)     Emphysema lung     Hypertension     Osteoporosis        Past Surgical History:   Procedure Laterality Date    KNEE ARTHROPLASTY Left     ORTHOPEDIC SURGERY      knee       Family History: No known family history of chronic lung disease or heart disease.    Social History:  reports that she has been smoking. She has a 25 pack-year smoking history. She has never used smokeless tobacco. She reports current alcohol use of about 1.0 standard drink of alcohol per week.    Home Medications:  Fluticasone-Umeclidin-Vilant and alendronate    Allergies:  Allergies   Allergen Reactions    Iodine Anaphylaxis    Lisinopril Swelling       Objective    Objective     Vitals:   Temp:  [97.3 °F (36.3 °C)] 97.3 °F (36.3 °C)  Heart Rate:  [62-78] 65  Resp:  [19] 19  BP: ()/(57-68) 97/57  Flow (L/min) (Oxygen Therapy):  [2] 2    Physical Exam:  Vital Signs Reviewed   WDWN, Alert, in mild distress, has conversational dyspnea, on nasal oxygen  HEENT:  PERRL, EOMI.  OP, nares clear, no sinus tenderness  Neck:  Supple, no JVD, no thyromegaly  Lymph: no axillary, cervical, supraclavicular lymphadenopathy noted bilaterally  Chest:  good aeration, clear to auscultation bilaterally, tympanic to percussion bilaterally, no work of breathing noted  CV: RRR, no MGR, pulses 2+, equal  Abd:  Soft, NT, ND, + BS, no HSM  EXT:  no clubbing, no cyanosis, no edema, no joint tenderness  Neuro:  A&Ox3, CN grossly intact, no focal deficits  Skin: No rashes or lesions noted      Result Review    Result Review:  I have personally reviewed the results from the time of this admission to 4/8/2025 17:19 EDT and agree with these findings:  [x]  Laboratory  [x]  Microbiology  [x]  Radiology  [x]  EKG/Telemetry   [x]  Cardiology/Vascular   []  Pathology  []  Old records  []   Other:  Most notable findings include:         Lab 04/08/25  1522   WBC 7.56   HEMOGLOBIN 14.0   HEMATOCRIT 43.1   PLATELETS 216   SODIUM 138   POTASSIUM 3.9   CHLORIDE 102   CO2 22.2   BUN 14   CREATININE 0.76   GLUCOSE 131*   CALCIUM 9.3   TOTAL PROTEIN 6.9   ALBUMIN 4.3   GLOBULIN 2.6                      Assessment & Plan   Assessment / Plan     Active Hospital Problems:  Active Hospital Problems    Diagnosis     **STEMI (ST elevation myocardial infarction)          Impression:  Acute inferior wall MI  Status post stenting to RCA  Chronic smoking  Hypertension  Possible COPD    Plan:  Continue to monitor hemodynamics closely in ICU.  Postop pain, diet and antibiotics per cardiology service.  Started aspirin 81 mg once daily  Continue with Brilinta 90 mg twice daily.  Started Crestor 20 mg once daily.  Check lipid profile.  Nicotine patch.  Start Brovana, Pulmicort and DuoNeb.  Supplemental oxygen via nasal cannula.  Currently on normal saline at 100 cc an hour.  Check HbA1c.  Needs to quit smoking.  Will need PFT as an outpatient.  Check chest x-ray.  We will consider CT scan of the chest without contrast given her chronic heavy smoking.  We will consider CT scan tomorrow.      Patient is critically ill in ICU with acute inferior wall MI, status post stenting to RCA, chronic smoking, hypertension, possible COPD.  I spent 32 minutes of critical care time, excluding any procedure notes, in review, analysis, obtaining history and physical, formulating care plan, and I led multi-disciplinary critical care rounds with bedside nurse, respiratory therapist, clinical pharmacist and other allied services. I have discussed the case with primary service and other consultants as well.     Electronically signed by Darrius Chong MD, 4/8/2025, 17:19 EDT.

## 2025-04-08 NOTE — ED PROVIDER NOTES
Time: 3:18 PM EDT  Date of encounter:  4/8/2025  Independent Historian/Clinical History and Information was obtained by:   Patient    History is limited by: N/A    Chief Complaint: Chest pain    History of Present Illness:  Patient is a 80 y.o. year old female who presents to the emergency department for evaluation of chest pain.  This patient presents to the emergency room coming with chest pain that radiated to her jaw and down her arms bilaterally.  The discomfort started earlier in the week and then went away and then came back earlier today.  The patient is also had shortness of breath but she said no fever chills cough vomiting or diarrhea      Patient Care Team  Primary Care Provider: Carmen Ayon MD    Past Medical History:     Allergies   Allergen Reactions    Iodine Anaphylaxis    Lisinopril Swelling     Past Medical History:   Diagnosis Date    Arthritis     COPD (chronic obstructive pulmonary disease)     Emphysema lung     Hypertension     Osteoporosis      Past Surgical History:   Procedure Laterality Date    KNEE ARTHROPLASTY Left     ORTHOPEDIC SURGERY      knee     Family History   Family history unknown: Yes       Home Medications:  Prior to Admission medications    Medication Sig Start Date End Date Taking? Authorizing Provider   alendronate (FOSAMAX) 70 MG tablet Take 1 tablet by mouth 1 (One) Time Per Week. 4/21/21   Emergency, Nurse Maria E, RN   Nader Franklin 100-62.5-25 MCG/INH inhaler Inhale 1 puff Daily. 5/16/22   Emergency, Nurse Maria E RN        Social History:   Social History     Tobacco Use    Smoking status: Every Day     Current packs/day: 1.00     Average packs/day: 1 pack/day for 25.0 years (25.0 ttl pk-yrs)     Types: Cigarettes    Smokeless tobacco: Never   Vaping Use    Vaping status: Never Used   Substance Use Topics    Alcohol use: Yes     Alcohol/week: 1.0 standard drink of alcohol     Types: 1 Glasses of wine per week     Comment: ocassionally    Drug use: Never  "        Review of Systems:  Review of Systems   Constitutional:  Negative for chills and fever.   HENT:  Negative for congestion, ear pain and sore throat.    Eyes:  Negative for pain.   Respiratory:  Negative for cough, chest tightness and shortness of breath.    Cardiovascular:  Positive for chest pain.   Gastrointestinal:  Negative for abdominal pain, diarrhea, nausea and vomiting.   Genitourinary:  Negative for flank pain and hematuria.   Musculoskeletal:  Negative for joint swelling.   Skin:  Negative for pallor.   Neurological:  Negative for seizures and headaches.   All other systems reviewed and are negative.       Physical Exam:  /65   Pulse 75   Temp 97.3 °F (36.3 °C) (Oral)   Resp 20   Ht 157.5 cm (62.01\")   Wt 42.4 kg (93 lb 7.6 oz)   SpO2 97%   BMI 17.09 kg/m²     Physical Exam  Vitals and nursing note reviewed.   Constitutional:       General: She is in acute distress.      Appearance: Normal appearance. She is not toxic-appearing.   HENT:      Head: Normocephalic and atraumatic.      Mouth/Throat:      Mouth: Mucous membranes are moist.   Eyes:      General: No scleral icterus.  Cardiovascular:      Rate and Rhythm: Normal rate and regular rhythm.      Pulses: Normal pulses.      Heart sounds: Normal heart sounds.   Pulmonary:      Effort: Pulmonary effort is normal. No respiratory distress.      Breath sounds: Normal breath sounds.   Abdominal:      General: Abdomen is flat.      Palpations: Abdomen is soft.      Tenderness: There is no abdominal tenderness.   Musculoskeletal:         General: Normal range of motion.      Cervical back: Normal range of motion and neck supple.   Skin:     General: Skin is warm and dry.      Capillary Refill: Capillary refill takes less than 2 seconds.   Neurological:      General: No focal deficit present.      Mental Status: She is alert and oriented to person, place, and time. Mental status is at baseline.                    Medical Decision " Making:      Comorbidities that affect care:    Smoking    External Notes reviewed:    Previous Clinic Note: Office visit with PCP      The following orders were placed and all results were independently analyzed by me:  Orders Placed This Encounter   Procedures    XR Chest 1 View    [place] Draw    High Sensitivity Troponin T    Comprehensive Metabolic Panel    Lipase    BNP    Magnesium    CBC Auto Differential    High Sensitivity Troponin T 1Hr    CBC (No Diff)    Basic Metabolic Panel    Lipid Panel    Comprehensive Metabolic Panel    Magnesium    Phosphorus    CBC (No Diff)    Thyroid Panel With TSH    Hemoglobin A1c    Diet: Cardiac; Healthy Heart (2-3 Na+); Fluid Consistency: Thin (IDDSI 0)    Undress & Gown    Vital Signs and Check distal extremity for warmth, color, sensation and pulses with each vital sign and site check.    Maintain IV Access    Telemetry - Place Orders & Notify Provider of Results When Patient Experiences Acute Chest Pain, Dysrhythmia or Respiratory Distress    Change site dressing    Encourage fluids    Strict intake and output    Activity - Strict Bed Rest    Keep Affected Arm Straight & Elevated    Continuous Pulse Oximetry    Advance Diet As Tolerated -    Notify MD if platelet count is less than 100,000, is less than 1/2 baseline, or if Hgb drops by more than 3mg/dl.    Notify MD of hypotension (SBP less than 95), bleeding, or dysrythmia and follow Sheath Removal Policy if needed.    Hold metFORMIN (GLUCOPHAGE) for 48 Hours    Code Status and Medical Interventions: CPR (Attempt to Resuscitate); Full Support    Cardiac Rehab Evaluation and Enrollment    Inpatient Consult to Advance Care Planning    Inpatient Spiritual Care Consult    Inpatient Nutrition Consult    Oxygen Therapy- Nasal Cannula; Titrate 1-6 LPM Per SpO2; 90 - 95%    Oxygen Therapy- Nasal Cannula; 2 LPM    RT to Initiate Bronchopulmonary Hygiene Protocol    POC Activated Clotting Time    POC Activated Clotting Time     ECG 12 Lead ED Triage Standing Order; Chest Pain    ECG 12 Lead ED Triage Standing Order; Chest Pain    ECG 12 Lead Other; post PCI RCA    ECG 12 Lead Other; Post PCI RCA    Adult Transthoracic Echo Complete W/ Cont if Necessary Per Protocol    Insert Peripheral IV    Inpatient Admission    CBC & Differential    Green Top (Gel)    Lavender Top    Gold Top - SST    Light Blue Top       Medications Given in the Emergency Department:  Medications   sodium chloride 0.9 % flush 10 mL ( Intravenous MAR Unhold 4/8/25 1723)   nitroglycerin (NITROSTAT) SL tablet 0.4 mg (has no administration in time range)   sodium chloride 0.9 % infusion (100 mL/hr Intravenous New Bag 4/8/25 1852)   acetaminophen (TYLENOL) tablet 650 mg (has no administration in time range)   morphine injection 1 mg (has no administration in time range)     And   naloxone (NARCAN) injection 0.4 mg (has no administration in time range)   ondansetron ODT (ZOFRAN-ODT) disintegrating tablet 4 mg (has no administration in time range)     Or   ondansetron (ZOFRAN) injection 4 mg (has no administration in time range)   metoprolol succinate XL (TOPROL-XL) 24 hr tablet 12.5 mg (12.5 mg Oral Given 4/8/25 1827)   rosuvastatin (CRESTOR) tablet 20 mg (has no administration in time range)   ticagrelor (BRILINTA) tablet 90 mg (has no administration in time range)   aspirin EC tablet 81 mg (81 mg Oral Given 4/8/25 1827)   budesonide (PULMICORT) nebulizer solution 0.5 mg (has no administration in time range)   arformoterol (BROVANA) nebulizer solution 15 mcg (has no administration in time range)   ipratropium-albuterol (DUO-NEB) nebulizer solution 3 mL (has no administration in time range)   nicotine (NICODERM CQ) 21 MG/24HR patch 1 patch (1 patch Transdermal Medication Applied 4/8/25 1827)   aspirin chewable tablet 324 mg (324 mg Oral Given 4/8/25 1520)   fentaNYL citrate (PF) (SUBLIMAZE) injection 50 mcg (50 mcg Intravenous Given 4/8/25 1525)   heparin (porcine) 5000  UNIT/ML injection 2,600 Units (2,600 Units Intravenous Given 4/8/25 1520)   nitroglycerin (NITROSTAT) ointment 1 inch (1 inch Topical Given 4/8/25 1523)   ondansetron (ZOFRAN) injection (4 mg Intravenous Given 4/8/25 1525)   atorvastatin (LIPITOR) tablet (40 mg Oral Given 4/8/25 1528)   Bivalirudin Trifluoroacetate (ANGIOMAX) 250 mg in sodium chloride 0.9 % 50 mL (5 mg/mL) infusion (1.75 mg/kg/hr × 42.4 kg Intravenous New Bag 4/8/25 1555)        ED Course:         EKG: Sinus rhythm with rate of 66 bpm  Left atrial enlargement  ST elevation in leads II, III and aVF  Reciprocal changes in leads I and aVL and V1 through V5      Labs:    Lab Results (last 24 hours)       Procedure Component Value Units Date/Time    High Sensitivity Troponin T [[id number]]  (Abnormal) Collected: 04/08/25 1522    Specimen: Blood Updated: 04/08/25 1603     HS Troponin T 130 ng/L     Narrative:      High Sensitive Troponin T Reference Range:  <14.0 ng/L- Negative Female for AMI  <22.0 ng/L- Negative Male for AMI  >=14 - Abnormal Female indicating possible myocardial injury.  >=22 - Abnormal Male indicating possible myocardial injury.   Clinicians would have to utilize clinical acumen, EKG, Troponin, and serial changes to determine if it is an Acute Myocardial Infarction or myocardial injury due to an underlying chronic condition.         CBC & Differential [[id number]]  (Abnormal) Collected: 04/08/25 1522    Specimen: Blood Updated: 04/08/25 1531    Narrative:      The following orders were created for panel order CBC & Differential.  Procedure                               Abnormality         Status                     ---------                               -----------         ------                     CBC Auto Differential[[id number]]        Abnormal            Final result                 Please view results for these tests on the individual orders.    Comprehensive Metabolic Panel [[id number]]  (Abnormal) Collected: 04/08/25 1522     Specimen: Blood Updated: 04/08/25 1548     Glucose 131 mg/dL      BUN 14 mg/dL      Creatinine 0.76 mg/dL      Sodium 138 mmol/L      Potassium 3.9 mmol/L      Comment: Slight hemolysis detected by analyzer. Result may be falsely elevated.        Chloride 102 mmol/L      CO2 22.2 mmol/L      Calcium 9.3 mg/dL      Total Protein 6.9 g/dL      Albumin 4.3 g/dL      ALT (SGPT) 13 U/L      AST (SGOT) 25 U/L      Alkaline Phosphatase 77 U/L      Total Bilirubin 0.5 mg/dL      Globulin 2.6 gm/dL      A/G Ratio 1.7 g/dL      BUN/Creatinine Ratio 18.4     Anion Gap 13.8 mmol/L      eGFR 78.8 mL/min/1.73     Narrative:      GFR Categories in Chronic Kidney Disease (CKD)      GFR Category          GFR (mL/min/1.73)    Interpretation  G1                     90 or greater         Normal or high (1)  G2                      60-89                Mild decrease (1)  G3a                   45-59                Mild to moderate decrease  G3b                   30-44                Moderate to severe decrease  G4                    15-29                Severe decrease  G5                    14 or less           Kidney failure          (1)In the absence of evidence of kidney disease, neither GFR category G1 or G2 fulfill the criteria for CKD.    eGFR calculation 2021 CKD-EPI creatinine equation, which does not include race as a factor    Lipase [[id number]]  (Normal) Collected: 04/08/25 1522    Specimen: Blood Updated: 04/08/25 1548     Lipase 25 U/L     BNP [[id number]]  (Normal) Collected: 04/08/25 1522    Specimen: Blood Updated: 04/08/25 1546     proBNP 961.3 pg/mL     Narrative:      This assay is used as an aid in the diagnosis of individuals suspected of having heart failure. It can be used as an aid in the diagnosis of acute decompensated heart failure (ADHF) in patients presenting with signs and symptoms of ADHF to the emergency department (ED). In addition, NT-proBNP of <300 pg/mL indicates ADHF is not likely.    Age Range Result  Interpretation  NT-proBNP Concentration (pg/mL:      <50             Positive            >450                   Gray                 300-450                    Negative             <300    50-75           Positive            >900                  Gray                300-900                  Negative            <300      >75             Positive            >1800                  Gray                300-1800                  Negative            <300    Magnesium [[id number]]  (Normal) Collected: 04/08/25 1522    Specimen: Blood Updated: 04/08/25 1548     Magnesium 2.0 mg/dL     CBC Auto Differential [[id number]]  (Abnormal) Collected: 04/08/25 1522    Specimen: Blood Updated: 04/08/25 1531     WBC 7.56 10*3/mm3      RBC 4.38 10*6/mm3      Hemoglobin 14.0 g/dL      Hematocrit 43.1 %      MCV 98.4 fL      MCH 32.0 pg      MCHC 32.5 g/dL      RDW 14.7 %      RDW-SD 53.7 fl      MPV 10.6 fL      Platelets 216 10*3/mm3      Neutrophil % 59.2 %      Lymphocyte % 29.0 %      Monocyte % 9.9 %      Eosinophil % 1.1 %      Basophil % 0.4 %      Immature Grans % 0.4 %      Neutrophils, Absolute 4.48 10*3/mm3      Lymphocytes, Absolute 2.19 10*3/mm3      Monocytes, Absolute 0.75 10*3/mm3      Eosinophils, Absolute 0.08 10*3/mm3      Basophils, Absolute 0.03 10*3/mm3      Immature Grans, Absolute 0.03 10*3/mm3      nRBC 0.0 /100 WBC     Thyroid Panel With TSH [[id number]] Collected: 04/08/25 1522    Specimen: Blood Updated: 04/08/25 1726    Hemoglobin A1c [[id number]] Collected: 04/08/25 1522    Specimen: Blood Updated: 04/08/25 1727    POC Activated Clotting Time [[id number]]  (Abnormal) Collected: 04/08/25 1624    Specimen: Blood Updated: 04/08/25 1630     Activated Clotting Time  429 Seconds      Comment: Serial Number: [id number]:  [id number]       High Sensitivity Troponin T 1Hr [[id number]]  (Abnormal) Collected: 04/08/25 1745    Specimen: Blood from Hand, Left Updated: 04/08/25 1815     HS Troponin T 383 ng/L      Troponin T  Numeric Delta 253 ng/L      Troponin T % Delta 195    Narrative:      High Sensitive Troponin T Reference Range:  <14.0 ng/L- Negative Female for AMI  <22.0 ng/L- Negative Male for AMI  >=14 - Abnormal Female indicating possible myocardial injury.  >=22 - Abnormal Male indicating possible myocardial injury.   Clinicians would have to utilize clinical acumen, EKG, Troponin, and serial changes to determine if it is an Acute Myocardial Infarction or myocardial injury due to an underlying chronic condition.                  Imaging:    XR Chest 1 View  Result Date: 2025  XR CHEST 1 VW Date of Exam: 2025 5:44 PM EDT Indication: Chest Pain Triage Protocol Comparison: 4/3/2017 Findings: Heart size normal. Lungs are mildly hyperexpanded with flattening of the diaphragms suggesting emphysema. Mild apical scarring appears similar to the prior study. Negative for pneumothorax. No focal consolidation. No pleural effusion. Osseous structures grossly intact.     Impression: 1. No acute process. 2. Emphysema with scarring at the lung apices similar to prior study. Electronically Signed: Jonas Costa MD  2025 6:22 PM EDT  Workstation ID: [id number]    Cardiac Catheterization/Vascular Study  Result Date: 2025  [facility] CARDIAC CATHETERIZATION PROCEDURE REPORT ACUTE ST ELEVATION MYOCARDIAL INFARCTION. Patient: Yanni Vega : 1945 MRN: [id number] Procedure Date: 25 Referring Physician: Rinku Guy MD Interventional Cardiologist: Rinku Guy MD, [facility] Indication  Acute  ST elevation inferior lateral wall myocardial infarction Clinical Presentation: 80-year-old female with acute inferior wall myocardial infarction.  He has chest pain associated with dyspnea for last 4 days.  EKG showed acute inferior wall ST elevation myocardial infarction. Procedure performed: Diagnostic Left Heart Catheterization Coronary Angiography Left Ventriculography Successful percutaneous coronary intervention to  the renal artery with JACQUELINE 3.0 33 mm Xience stent Access Site(s): Right radial Findings: 1. Coronary Artery Anatomy: Dominance: Right Left Main: Calcification Left Anterior Descendin% proximal stenosis involving the first large diagonal branch.  Mid LAD have 75% eccentric stenosis.  Flow was CATRACHITA-3 Left Circumflex Artery: Calcified 85% proximal stenosis.  Distal second marginal branch have mild disease. Right Coronary Artery: Large dominant vessel with 100% mid segment thrombotic occlusion.  CATRACHITA 0 flow 2. Hemodynamics:    The opening aortic pressure is 120/80 mmHg. The left ventricular end-diastolic pressure is 22 mmHg. There was no gradient across aortic valve on pullback  3. Left Ventriculogram: Ejection Fraction: 65% Wall Motion: Hypokinesis of the inferior wall Mitral Regurgitation: Mild 4. Percutaneous Intervention: Location: RCA Treatment: JACQUELINE 3.0 33 mm XIENCE Stent. Pre-stenosis: 100 % Post-stenosis: 0 % Lesion Type C: N CATRACHITA Flow Pre: O CATRACHITA Flow Post: 3 Bifurcation: N Severe Calcium: N Dissection: No Conclusions: Right Coronary Artery: Large dominant vessel with 100% mid segment thrombotic occlusion.  CATRACHITA 0 flow s/p PCI with JACQUELINE 3.0 33 mm XIENCE Stent, 0 % residual stenosis, post CATRACHITA 3 flow. Recommendations: Dual antiplatelet therapy with aspirin 81 mg oral daily and ticagrelor 90 mg oral twice a day for minimum of 1 year. High dose  statin therapy, beta-blockers and blood pressure control. Consider revascularization of the LAD and circumflex artery, CABG versus high risk PCI as an outpatient Procedure Status: Completed. Details of the procedure: Informed consent was obtained with an explanation of the risks, benefits and alternatives of the procedure. The patient was brought to the Cardiac Catheterization Laboratory and was prepped and draped in a standard sterile fashion. Moderate sedation with Fentanyl and Versed was administered by the circulating nurse. Lidocaine 2% was used to anesthetize the  Right  radial artery and a 5/6 Slender sheath was placed.   A 6 F JL 4.0, JR 3.5 guide catheters used.  This catheter was used to engage the right and left coronary arteries with diagnostic angiography obtained in all appropriate projections.  We then exchanged for an angled pigtail catheter and enter the left ventricle to measure hemodynamics and perform a left ventriculogram.  The catheter was then removed over a guidewire. Details of angioplasty: After the clinical and angiographic data decision was made to proceed with PCI of the right coronary artery.  Angiomax was given IV push followed by infusion.  6 Slovenian 3 DRC guide catheter was used to engage the right coronary artery and a BMW wire was able to advance and crossed the occlusion site and placed distally.  The stenotic segment with thrombus was dilated with a 2.5 x 20 mm balloon to 16 brenane and stented with a drug-eluting 3.0 x 33 mm Xience stent deployed at 18 breanne and postdilated with the stent balloon to 22 breanne with excellent stent apposition and results, no residual stenosis and post CATRACHITA-3 flow.  The ST segment resolved the patient was chest pain-free. Heparin was used for anticoagulation throughout the procedure with frequent checking of ACT.  Patient was already on aspirin.  Ticagrelor   180 mg oral given.  At the end of the procedure, the radial artery sheath was removed and TR band was applied for hemostasis.  Patient tolerated procedure well without any complications.  The results of the test were explained in detail to the patient. Cumulative fluoroscopy time: 16 min Cumulative air kerma: 215 mGy Total amount of contrast used: 100 ml of Isovue Complications: None. Estimated Blood Loss: Minimal. Rinku Guy MD, [facility] 04/08/25 16:48 EDT         Differential Diagnosis and Discussion:    Chest Pain:  Based on the patient's signs and symptoms, I considered aortic dissection, myocardial infaction, pulmonary embolism, cardiac tamponade, pericarditis,  pneumothorax, musculoskeletal chest pain and other differential diagnosis as an etiology of the patient's chest pain.     PROCEDURES:    Labs were collected in the emergency department and all labs were reviewed and interpreted by me.  X-ray were performed in the emergency department and all X-ray impressions were independently interpreted by me.  An EKG was performed and the EKG was interpreted by me.    ECG 12 Lead Other; post PCI RCA   Preliminary Result   HEART RATE=77  bpm   RR Esbualxq=505  ms   VA Bsicvaje=572  ms   P Horizontal Axis=-64  deg   P Front Axis=85  deg   QRSD Interval=84  ms   QT Zammucbu=160  ms   DKoF=740  ms   QRS Axis=19  deg   T Wave Axis=  deg   - ABNORMAL ECG -   Sinus rhythm   Ventricular premature complex   Biatrial enlargement   Probable left ventricular hypertrophy   Probable inferior infarct, recent   Prolonged QT interval   Date and Time of Study:2025-04-08 16:49:38      ECG 12 Lead ED Triage Standing Order; Chest Pain   Preliminary Result   HEART RATE=66  bpm   RR Jkhmxmyr=410  ms   VA Afmiobef=572  ms   P Horizontal Axis=-32  deg   P Front Axis=82  deg   QRSD Interval=70  ms   QT Lsteklzk=250  ms   CBdI=763  ms   QRS Axis=51  deg   T Wave Axis=103  deg   - ABNORMAL ECG -   Sinus rhythm   Left atrial enlargement   Inferior infarct, acute (RCA)   Anterior infarct, age indeterminate   Date and Time of Study:2025-04-08 15:08:54          Procedures    MDM     Amount and/or Complexity of Data Reviewed  Tests in the medicine section of CPT®: reviewed             Total Critical Care time of 30 minutes. Total critical care time documented does not include time spent on separately billed procedures for services of nurses or physician assistants. I personally saw and examined the patient. I have reviewed all diagnostic interpretations and treatment plans as written. I was present for the key portions of any procedures performed and the inclusive time noted in any critical care statement.  Critical care time includes patient management by me, time spent at the patients bedside,  time to review lab and imaging results, discussing patient care, documentation in the medical record, and time spent with family or caregiver.          Patient Care Considerations:    CT CHEST: I considered ordering a CT scan of the chest, however the patient is having a STEMI and needs to go to Cath Lab      Consultants/Shared Management Plan:    Consultant: I have discussed the case with Dr. Jose Guadalupe Hendricks who states take the patient to the Cath Lab    Social Determinants of Health:    Patient is unable to carry out activities of daily life. Escalation of care is necessary.       Disposition and Care Coordination:    Admit:   Through independent evaluation of the patient's history, physical, and imperical data, the patient meets criteria for inpatient admission to the hospital.        Final diagnoses:   ST elevation myocardial infarction (STEMI), unspecified artery        ED Disposition       ED Disposition   Intended Admit    Condition   --    Comment   --               This medical record created using voice recognition software.             Segundo Magallanes,   04/08/25 1904

## 2025-04-09 ENCOUNTER — APPOINTMENT (OUTPATIENT)
Dept: CARDIOLOGY | Facility: HOSPITAL | Age: 80
End: 2025-04-09
Payer: MEDICARE

## 2025-04-09 LAB
ALBUMIN SERPL-MCNC: 3.7 G/DL (ref 3.5–5.2)
ALBUMIN/GLOB SERPL: 1.9 G/DL
ALP SERPL-CCNC: 67 U/L (ref 39–117)
ALT SERPL W P-5'-P-CCNC: 15 U/L (ref 1–33)
ANION GAP SERPL CALCULATED.3IONS-SCNC: 10.2 MMOL/L (ref 5–15)
AORTIC DIMENSIONLESS INDEX: 0.85 (DI)
AST SERPL-CCNC: 46 U/L (ref 1–32)
AV MEAN PRESS GRAD SYS DOP V1V2: 6 MMHG
AV VMAX SYS DOP: 159 CM/SEC
BH CV ECHO MEAS - AO MAX PG: 10.1 MMHG
BH CV ECHO MEAS - AO ROOT DIAM: 2.7 CM
BH CV ECHO MEAS - AO V2 VTI: 32 CM
BH CV ECHO MEAS - AVA(I,D): 2.7 CM2
BH CV ECHO MEAS - EDV(CUBED): 32.8 ML
BH CV ECHO MEAS - EDV(MOD-SP2): 28.3 ML
BH CV ECHO MEAS - EDV(MOD-SP4): 55.4 ML
BH CV ECHO MEAS - EF(MOD-SP2): 69.9 %
BH CV ECHO MEAS - EF(MOD-SP4): 84.1 %
BH CV ECHO MEAS - ESV(CUBED): 6.9 ML
BH CV ECHO MEAS - ESV(MOD-SP2): 8.5 ML
BH CV ECHO MEAS - ESV(MOD-SP4): 8.8 ML
BH CV ECHO MEAS - FS: 40.6 %
BH CV ECHO MEAS - IVS/LVPW: 1.08 CM
BH CV ECHO MEAS - IVSD: 1.4 CM
BH CV ECHO MEAS - LA DIMENSION: 3.8 CM
BH CV ECHO MEAS - LAT PEAK E' VEL: 8.2 CM/SEC
BH CV ECHO MEAS - LV DIASTOLIC VOL/BSA (35-75): 40.1 CM2
BH CV ECHO MEAS - LV MASS(C)D: 144.2 GRAMS
BH CV ECHO MEAS - LV MAX PG: 10.4 MMHG
BH CV ECHO MEAS - LV MEAN PG: 4 MMHG
BH CV ECHO MEAS - LV SYSTOLIC VOL/BSA (12-30): 6.4 CM2
BH CV ECHO MEAS - LV V1 MAX: 161 CM/SEC
BH CV ECHO MEAS - LV V1 VTI: 27.2 CM
BH CV ECHO MEAS - LVIDD: 3.2 CM
BH CV ECHO MEAS - LVIDS: 1.9 CM
BH CV ECHO MEAS - LVOT AREA: 3.1 CM2
BH CV ECHO MEAS - LVOT DIAM: 2 CM
BH CV ECHO MEAS - LVPWD: 1.3 CM
BH CV ECHO MEAS - MED PEAK E' VEL: 6.3 CM/SEC
BH CV ECHO MEAS - MR MAX PG: 108.6 MMHG
BH CV ECHO MEAS - MR MAX VEL: 521 CM/SEC
BH CV ECHO MEAS - MV A MAX VEL: 106 CM/SEC
BH CV ECHO MEAS - MV DEC SLOPE: 582 CM/SEC2
BH CV ECHO MEAS - MV DEC TIME: 0.22 SEC
BH CV ECHO MEAS - MV E MAX VEL: 127.5 CM/SEC
BH CV ECHO MEAS - MV E/A: 1.2
BH CV ECHO MEAS - MV MAX PG: 8 MMHG
BH CV ECHO MEAS - MV MEAN PG: 3 MMHG
BH CV ECHO MEAS - MV P1/2T: 74 MSEC
BH CV ECHO MEAS - MV V2 VTI: 43.1 CM
BH CV ECHO MEAS - MVA(P1/2T): 3 CM2
BH CV ECHO MEAS - MVA(VTI): 1.98 CM2
BH CV ECHO MEAS - RAP SYSTOLE: 3 MMHG
BH CV ECHO MEAS - RVDD: 2 CM
BH CV ECHO MEAS - RVSP: 50.3 MMHG
BH CV ECHO MEAS - SV(LVOT): 85.5 ML
BH CV ECHO MEAS - SV(MOD-SP2): 19.8 ML
BH CV ECHO MEAS - SV(MOD-SP4): 46.6 ML
BH CV ECHO MEAS - SVI(LVOT): 61.9 ML/M2
BH CV ECHO MEAS - SVI(MOD-SP2): 14.3 ML/M2
BH CV ECHO MEAS - SVI(MOD-SP4): 33.7 ML/M2
BH CV ECHO MEAS - TR MAX PG: 47.3 MMHG
BH CV ECHO MEAS - TR MAX VEL: 344 CM/SEC
BH CV ECHO MEASUREMENTS AVERAGE E/E' RATIO: 17.59
BILIRUB SERPL-MCNC: 0.3 MG/DL (ref 0–1.2)
BUN SERPL-MCNC: 10 MG/DL (ref 8–23)
BUN/CREAT SERPL: 14.5 (ref 7–25)
CALCIUM SPEC-SCNC: 8.4 MG/DL (ref 8.6–10.5)
CHLORIDE SERPL-SCNC: 104 MMOL/L (ref 98–107)
CHOLEST SERPL-MCNC: 178 MG/DL (ref 0–200)
CO2 SERPL-SCNC: 24.8 MMOL/L (ref 22–29)
CREAT SERPL-MCNC: 0.69 MG/DL (ref 0.57–1)
DEPRECATED RDW RBC AUTO: 50.7 FL (ref 37–54)
EGFRCR SERPLBLD CKD-EPI 2021: 87.3 ML/MIN/1.73
ERYTHROCYTE [DISTWIDTH] IN BLOOD BY AUTOMATED COUNT: 14.6 % (ref 12.3–15.4)
GLOBULIN UR ELPH-MCNC: 1.9 GM/DL
GLUCOSE SERPL-MCNC: 122 MG/DL (ref 65–99)
HCT VFR BLD AUTO: 34.5 % (ref 34–46.6)
HDLC SERPL-MCNC: 67 MG/DL (ref 40–60)
HGB BLD-MCNC: 11.8 G/DL (ref 12–15.9)
LDLC SERPL CALC-MCNC: 93 MG/DL (ref 0–100)
LDLC/HDLC SERPL: 1.36 {RATIO}
LEFT ATRIUM VOLUME INDEX: 34.8 ML/M2
LV EF BIPLANE MOD: 77.9 %
MAGNESIUM SERPL-MCNC: 1.8 MG/DL (ref 1.6–2.4)
MCH RBC QN AUTO: 32.2 PG (ref 26.6–33)
MCHC RBC AUTO-ENTMCNC: 34.2 G/DL (ref 31.5–35.7)
MCV RBC AUTO: 94.3 FL (ref 79–97)
PHOSPHATE SERPL-MCNC: 3.7 MG/DL (ref 2.5–4.5)
PLATELET # BLD AUTO: 169 10*3/MM3 (ref 140–450)
PMV BLD AUTO: 10.3 FL (ref 6–12)
POTASSIUM SERPL-SCNC: 3.2 MMOL/L (ref 3.5–5.2)
PROT SERPL-MCNC: 5.6 G/DL (ref 6–8.5)
QT INTERVAL: 431 MS
QTC INTERVAL: 468 MS
RBC # BLD AUTO: 3.66 10*6/MM3 (ref 3.77–5.28)
SODIUM SERPL-SCNC: 139 MMOL/L (ref 136–145)
TRIGL SERPL-MCNC: 101 MG/DL (ref 0–150)
VLDLC SERPL-MCNC: 18 MG/DL (ref 5–40)
WBC NRBC COR # BLD AUTO: 7.29 10*3/MM3 (ref 3.4–10.8)

## 2025-04-09 PROCEDURE — 25010000002 MAGNESIUM SULFATE 2 GM/50ML SOLUTION: Performed by: NURSE PRACTITIONER

## 2025-04-09 PROCEDURE — 94799 UNLISTED PULMONARY SVC/PX: CPT

## 2025-04-09 PROCEDURE — 80061 LIPID PANEL: CPT | Performed by: INTERNAL MEDICINE

## 2025-04-09 PROCEDURE — 94664 DEMO&/EVAL PT USE INHALER: CPT

## 2025-04-09 PROCEDURE — 80053 COMPREHEN METABOLIC PANEL: CPT | Performed by: PHYSICIAN ASSISTANT

## 2025-04-09 PROCEDURE — 93306 TTE W/DOPPLER COMPLETE: CPT

## 2025-04-09 PROCEDURE — 99291 CRITICAL CARE FIRST HOUR: CPT | Performed by: INTERNAL MEDICINE

## 2025-04-09 PROCEDURE — 93005 ELECTROCARDIOGRAM TRACING: CPT | Performed by: INTERNAL MEDICINE

## 2025-04-09 PROCEDURE — 85027 COMPLETE CBC AUTOMATED: CPT | Performed by: PHYSICIAN ASSISTANT

## 2025-04-09 PROCEDURE — 93306 TTE W/DOPPLER COMPLETE: CPT | Performed by: INTERNAL MEDICINE

## 2025-04-09 PROCEDURE — 84100 ASSAY OF PHOSPHORUS: CPT | Performed by: PHYSICIAN ASSISTANT

## 2025-04-09 PROCEDURE — 99232 SBSQ HOSP IP/OBS MODERATE 35: CPT | Performed by: INTERNAL MEDICINE

## 2025-04-09 PROCEDURE — 83735 ASSAY OF MAGNESIUM: CPT | Performed by: PHYSICIAN ASSISTANT

## 2025-04-09 RX ORDER — POTASSIUM CHLORIDE 750 MG/1
30 CAPSULE, EXTENDED RELEASE ORAL DAILY
Status: DISCONTINUED | OUTPATIENT
Start: 2025-04-09 | End: 2025-04-10 | Stop reason: HOSPADM

## 2025-04-09 RX ORDER — POTASSIUM CHLORIDE 750 MG/1
40 CAPSULE, EXTENDED RELEASE ORAL ONCE
Status: COMPLETED | OUTPATIENT
Start: 2025-04-09 | End: 2025-04-09

## 2025-04-09 RX ORDER — MAGNESIUM SULFATE HEPTAHYDRATE 40 MG/ML
2 INJECTION, SOLUTION INTRAVENOUS ONCE
Status: COMPLETED | OUTPATIENT
Start: 2025-04-09 | End: 2025-04-09

## 2025-04-09 RX ORDER — DIPHENHYDRAMINE HCL 25 MG
25 CAPSULE ORAL ONCE
Status: DISCONTINUED | OUTPATIENT
Start: 2025-04-09 | End: 2025-04-10 | Stop reason: HOSPADM

## 2025-04-09 RX ORDER — IPRATROPIUM BROMIDE AND ALBUTEROL SULFATE 2.5; .5 MG/3ML; MG/3ML
3 SOLUTION RESPIRATORY (INHALATION) EVERY 4 HOURS PRN
Status: DISCONTINUED | OUTPATIENT
Start: 2025-04-09 | End: 2025-04-10 | Stop reason: HOSPADM

## 2025-04-09 RX ADMIN — POTASSIUM CHLORIDE 40 MEQ: 750 CAPSULE, EXTENDED RELEASE ORAL at 09:06

## 2025-04-09 RX ADMIN — BUDESONIDE 0.5 MG: 0.5 SUSPENSION RESPIRATORY (INHALATION) at 06:42

## 2025-04-09 RX ADMIN — METOPROLOL SUCCINATE 12.5 MG: 25 TABLET, FILM COATED, EXTENDED RELEASE ORAL at 09:07

## 2025-04-09 RX ADMIN — IPRATROPIUM BROMIDE AND ALBUTEROL SULFATE 3 ML: .5; 3 SOLUTION RESPIRATORY (INHALATION) at 06:42

## 2025-04-09 RX ADMIN — Medication 10 ML: at 20:41

## 2025-04-09 RX ADMIN — TICAGRELOR 90 MG: 90 TABLET ORAL at 09:06

## 2025-04-09 RX ADMIN — NICOTINE 1 PATCH: 21 PATCH, EXTENDED RELEASE TRANSDERMAL at 09:11

## 2025-04-09 RX ADMIN — ASPIRIN 81 MG: 81 TABLET, COATED ORAL at 09:06

## 2025-04-09 RX ADMIN — TICAGRELOR 90 MG: 90 TABLET ORAL at 20:41

## 2025-04-09 RX ADMIN — MAGNESIUM SULFATE HEPTAHYDRATE 2 G: 40 INJECTION, SOLUTION INTRAVENOUS at 09:09

## 2025-04-09 RX ADMIN — MUPIROCIN 1 APPLICATION: 20 OINTMENT TOPICAL at 20:47

## 2025-04-09 RX ADMIN — IPRATROPIUM BROMIDE AND ALBUTEROL SULFATE 3 ML: .5; 3 SOLUTION RESPIRATORY (INHALATION) at 11:45

## 2025-04-09 RX ADMIN — ROSUVASTATIN CALCIUM 20 MG: 20 TABLET, FILM COATED ORAL at 20:41

## 2025-04-09 RX ADMIN — ARFORMOTEROL TARTRATE 15 MCG: 15 SOLUTION RESPIRATORY (INHALATION) at 06:42

## 2025-04-09 NOTE — CONSULTS
"Nutrition Services    Patient Name: Yanni Vega  YOB: 1945  MRN: [id number]  Admission date: 4/8/2025      CLINICAL NUTRITION ASSESSMENT      Reason for Assessment  MST Score 2+ and BMI     H&P:  Past Medical History:   Diagnosis Date    Arthritis     COPD (chronic obstructive pulmonary disease)     Emphysema lung     Hypertension     Osteoporosis         Current Problems:   Active Hospital Problems    Diagnosis     **STEMI (ST elevation myocardial infarction)         Nutrition/Diet History         Narrative   Patient admitted to ICU with STEMI.  Screened at risk with BMI of 17.09, MST of 4 for weight loss and poor appetite.  PCI completed yesterday, 100% occlusion of mid large right coronary artery noted.  Current diet appropriate.    Pt reports 100% intake, no chewing/swallowing problems, no n/v/d/c. She has been trying to gain wt, reports that her UBW several years ago was 130 lb. Struggles with cooking, she lives at home alone and doesn't always have the energy to get up and cook. Result is she consumes a lot of microwave meals that are high in sodium and saturated fats. Reports she doesn't really care for ONS but is willing to try the coffee flavored ones. Discussed diet recommendations after discharge, supplements to help with wt gain, and places to cut back on sodium and saturated fats. Also discussed alternative options for fats in diet that include omega 3's.      Anthropometrics        Current Height, Weight Height: 157.5 cm (62.01\")  Weight: 42.4 kg (93 lb 7.6 oz)   Current BMI Body mass index is 17.09 kg/m².   BMI Classification Underweight  - healthy BMI for age 23-30   % IBW 79%   Adjusted Body Weight (ABW)    Weight Hx  Wt Readings from Last 30 Encounters:   04/08/25 1509 42.4 kg (93 lb 7.6 oz)   04/15/23 1121 43.1 kg (95 lb)   08/04/22 1014 43.9 kg (96 lb 12.8 oz)   06/26/21 1942 44.9 kg (99 lb)   06/19/21 1849 45.1 kg (99 lb 8 oz)          Wt Change Observation No recent weight " "history available to trend, appears stable for several years     Estimated/Assessed Needs  Estimated Needs based on: Current Body Weight       Energy Requirements 30-35 kcal/kg    EST Needs (kcal/day) 0316-5890       Protein Requirements 1.0-1.2 g/kg   EST Daily Needs (g/day) 43-51       Fluid Requirements 20-25 ml/kg    Estimated Needs (mL/day) 848-1060     Labs/Medications         Pertinent Labs Reviewed.   Results from last 7 days   Lab Units 04/09/25  0210 04/08/25  1522   SODIUM mmol/L 139 138   POTASSIUM mmol/L 3.2* 3.9   CHLORIDE mmol/L 104 102   CO2 mmol/L 24.8 22.2   BUN mg/dL 10 14   CREATININE mg/dL 0.69 0.76   CALCIUM mg/dL 8.4* 9.3   BILIRUBIN mg/dL 0.3 0.5   ALK PHOS U/L 67 77   ALT (SGPT) U/L 15 13   AST (SGOT) U/L 46* 25   GLUCOSE mg/dL 122* 131*     Results from last 7 days   Lab Units 04/09/25  0210 04/08/25  1522   MAGNESIUM mg/dL 1.8 2.0   PHOSPHORUS mg/dL 3.7  --    HEMOGLOBIN g/dL 11.8* 14.0   HEMATOCRIT % 34.5 43.1   TRIGLYCERIDES mg/dL 101  --      No results found for: \"COVID19\"  Lab Results   Component Value Date    HGBA1C 5.60 04/08/2025         Pertinent Medications Reviewed.     Malnutrition Severity Assessment      Patient meets criteria for : (S) Severe Malnutrition  Malnutrition Type (Last 8 Hours)       Malnutrition Severity Assessment       Row Name 04/09/25 1523       Malnutrition Severity Assessment    Malnutrition Type Chronic Disease - Related Malnutrition       Row Name 04/09/25 1523       Insufficient Energy Intake     Insufficient Energy Intake Findings Severe     Insufficient Energy Intake  <75% of est. energy requirement for > or equal to 1 month      Row Name 04/09/25 1523       Unintentional Weight Loss     Unintentional Weight Loss Findings None      Row Name 04/09/25 1523       Muscle Loss    Loss of Muscle Mass Findings Severe     Restoration Region Severe - deep hollowing/scooping, lack of muscle to touch, facial bones well defined    Clavicle Bone Region Severe - " protruding prominent bone    Acromion Bone Region Severe - squared shoulders, bones, and acromion process protrusion prominent    Dorsal Hand Region Severe - prominent depression      Row Name 04/09/25 1523       Fat Loss    Subcutaneous Fat Loss Findings Severe     Orbital Region  Severe - pronounced hollowness/depression, dark circles, loose saggy skin    Upper Arm Region Severe - mostly skin, very little space between folds, fingers touch      Row Name 04/09/25 1523       Criteria Met (Must meet criteria for severity in at least 2 of these categories: M Wasting, Fat Loss, Fluid, Secondary Signs, Wt. Status, Intake)    Patient meets criteria for  Severe Malnutrition                      Nutrition Diagnosis         Nutrition Dx Problem 1 Severe malnutrition related to decreased ability to consume sufficient energy as evidenced by inadequate energy intake., body composition changes., and pt report of difficulty eating enough as well as difficulty cooking while living alone     Nutrition Intervention           Current Nutrition Orders & Evaluation of Intake       Current PO Diet Diet: Cardiac; Healthy Heart (2-3 Na+); Fluid Consistency: Thin (IDDSI 0)   Supplement No active supplement orders           Nutrition Intervention/Prescription        Continue heart healthy diet as ordered  Head Novasource BID and mocha flavor (475 kcal, 22 g pro)  Consult to social work for assistance with senior meal programs        Medical Nutrition Therapy/Nutrition Education          Learner     Readiness Patient  Eager     Method     Response Explanation  Verbalizes understanding     Monitor/Evaluation        Monitor PO intake, Supplement intake, Weight, Skin status, Hemodynamic stability     Nutrition Discharge Plan         Recommend to continue oral nutrition supplements on discharge.      Electronically signed by:  Irish Perez RD  04/09/25 15:28 EDT

## 2025-04-09 NOTE — PROGRESS NOTES
[facility]     Progress Note    Patient Name: Yanni Vega  : 1945  MRN: [id number]  Primary Care Physician:  Carmen Ayon MD  Date of admission: 2025    Subjective   Subjective       Chief Complaint:   Patient is critically ill in ICU with acute inferior wall MI status post stenting to RCA, chronic smoking, hypertension, COPD.      Over last 24 hours, had acute MI, s/p post RCA stenting, transferred to ICU.  Chest x-ray was done.  Remained on aspirin and Brilinta.  Remained on statin.  Started on nebulizers and nicotine patch.    Overnight, no acute events.     This morning,  She is on supplemental oxygen.  No significant hemodynamic issues.  No significant arrhythmias seen.  Chest pain is resolved.  Has some shortness of breath with conversation.  No nausea or vomiting.  No fever or chills.  No cough or phlegm.      Review of Systems  General:  Fatigue, No Fever  HEENT: No dysphagia, No Visual Changes, no rhinorrhea  Respiratory:  + cough,+Dyspnea, +phlegm, No Pleuritic Pain, + wheezing, no hemoptysis  Cardiovascular: Denies chest pain, denies palpitations,+JOHNS, Improved chest Pressure  Gastrointestinal:  No Abdominal Pain, No Nausea, No Vomiting, No Diarrhea  Genitourinary:  No Dysuria, No Frequency, No Hesitancy  Musculoskeletal: No muscle pain or swelling  Endocrine:  No Heat Intolerance, No Cold Intolerance  Neurologic:  No Confusion, no Dysarthria, No Headaches  Skin:  No Rash, No Open Wounds      Objective   Objective     Vitals:   Temp:  [97.3 °F (36.3 °C)-98.1 °F (36.7 °C)] 98.1 °F (36.7 °C)  Heart Rate:  [60-86] 66  Resp:  [14-26] 16  BP: ()/(48-84) 119/58  Flow (L/min) (Oxygen Therapy):  [2] 2  Physical Exam   Vital Signs Reviewed  WDWN, Alert, in mild distress, has conversational dyspnea  HEENT:  PERRL, EOMI.  OP, nares clear, no sinus tenderness  Neck:  Supple, No JVD, no thyromegaly  Lymph: no axillary, cervical, supraclavicular lymphadenopathy noted bilaterally  Chest:   poor aeration, diminished breath sounds, resonant to percussion b/l, no increased work of breathing noted  CV: RRR, no MGR, pulses 2+, equal  Abd:  Soft, NT, ND, + BS, no HSM  EXT:  no clubbing, no cyanosis, No BLE edema  Neuro:  A&Ox3, CN grossly intact, no focal deficits  Skin: No rashes or lesions noted      Result Review    Result Review:  I have personally reviewed the results from the time of this admission to 4/9/2025 07:43 EDT and agree with these findings:  [x]  Laboratory  [x]  Microbiology  [x]  Radiology  [x]  EKG/Telemetry   [x]  Cardiology/Vascular   []  Pathology  []  Old records  []  Other:  Most notable findings include:         Lab 04/09/25  0210 04/08/25  1522   WBC 7.29 7.56   HEMOGLOBIN 11.8* 14.0   HEMATOCRIT 34.5 43.1   PLATELETS 169 216   SODIUM 139 138   POTASSIUM 3.2* 3.9   CHLORIDE 104 102   CO2 24.8 22.2   BUN 10 14   CREATININE 0.69 0.76   GLUCOSE 122* 131*   CALCIUM 8.4* 9.3   PHOSPHORUS 3.7  --    TOTAL PROTEIN 5.6* 6.9   ALBUMIN 3.7 4.3   GLOBULIN 1.9 2.6     XR Chest 1 View  Result Date: 4/8/2025  XR CHEST 1 VW Date of Exam: 4/8/2025 5:44 PM EDT Indication: Chest Pain Triage Protocol Comparison: 4/3/2017 Findings: Heart size normal. Lungs are mildly hyperexpanded with flattening of the diaphragms suggesting emphysema. Mild apical scarring appears similar to the prior study. Negative for pneumothorax. No focal consolidation. No pleural effusion. Osseous structures grossly intact.     Impression: Impression: 1. No acute process. 2. Emphysema with scarring at the lung apices similar to prior study. Electronically Signed: Jonas Costa MD  4/8/2025 6:22 PM EDT  Workstation ID: [id number]             Assessment & Plan   Assessment / Plan     Brief Patient Summary:  Yanni Vega is a 80 y.o. female   Acute inferior wall MI  Status post stenting to RCA  Chronic smoking  Hypertension  Possible COPD    Active Hospital Problems:  Active Hospital Problems    Diagnosis     **STEMI (ST elevation  myocardial infarction)      Plan:   Postop pain, diet and antibiotics per cardiology service.  Continue aspirin 81 mg once daily  Continue with Brilinta 90 mg twice daily.  Continue with Crestor 20 mg once daily.  Lipid panel checked.  Nicotine patch.  Continue Brovana, Pulmicort and DuoNeb.  Supplemental oxygen via nasal cannula.  Stop IV fluids.  Check HbA1c.  Needs to quit smoking.  Will need PFT as an outpatient.  Chest x-ray with emphysema.    VTE Prophylaxis:  No VTE prophylaxis order currently exists.        CODE STATUS:   Code Status (Patient has no pulse and is not breathing): CPR (Attempt to Resuscitate)  Medical Interventions (Patient has pulse or is breathing): Full Support  Level Of Support Discussed With: Patient    Patient is critically ill in ICU with acute MI status post stenting to RCA, COPD, hypercholesterolemia, hypertension.  I spent 31 minutes of critical care time excluding any procedure notes, spent in review, analysis, obtaining history and physical, formulating care plan, and I led multi-disciplinary critical care rounds with bedside nurse, respiratory therapist, clinical pharmacist and other allied services. I have discussed the case with primary service and other consultants as well.       Electronically signed by Darrius Chong MD, 4/9/2025, 07:43 EDT.

## 2025-04-09 NOTE — SIGNIFICANT NOTE
04/09/25 1105   Coping/Psychosocial   Observed Emotional State calm;cooperative;happy   Verbalized Emotional State relief;hopefulness   Trust Relationship/Rapport empathic listening provided   Family/Support Persons family   Involvement in Care interacting with patient   Additional Documentation Spiritual Care (Group)   Spiritual Care   Spiritual Care Visit Type initial   Spiritual Care Source other (see comments)  (Consult)   Receptivity to Spiritual Care visit welcomed   Spiritual Care Interventions supportive conversation provided;prayer support provided   Response to Spiritual Care receptive of support;engaged in conversation;thanks expressed   Use of Spiritual Resources prayer;spirituality for coping, indicated strong use of   Spiritual Care Follow-Up follow-up, none required as presently assessed     Length of visit: 12 min

## 2025-04-09 NOTE — PROGRESS NOTES
[facility]     Cardiology Progress Note    Patient Name: Yanni Vega  : 1945  MRN: [id number]  Primary Care Physician:  Carmen Ayon MD  Date of admission: 2025    Subjective   Subjective     Chief Complaint: Patient denies angina or dyspnea    Interval HPI:    Patient remains in sinus rhythm.    Review of Systems   All systems were reviewed and negative except for: T    Objective   Objective     Vitals:   Temp:  [97.3 °F (36.3 °C)-98.1 °F (36.7 °C)] 98.1 °F (36.7 °C)  Heart Rate:  [60-86] 69  Resp:  [14-26] 18  BP: ()/(48-84) 106/56  Flow (L/min) (Oxygen Therapy):  [2] 2  Physical Exam      Alert  Heart. Regular, no gallops, no rubs  Lungs: no rales, no wheezing  Abdomen: soft, bs +  LE: no edema  Neurologic. No motor deficits.     Scheduled Meds:arformoterol, 15 mcg, Nebulization, BID - RT  aspirin, 81 mg, Oral, Daily  budesonide, 0.5 mg, Nebulization, BID - RT  ipratropium-albuterol, 3 mL, Nebulization, 4x Daily - RT  metoprolol succinate XL, 12.5 mg, Oral, Daily  mupirocin, 1 Application, Each Nare, BID  nicotine, 1 patch, Transdermal, Q24H  rosuvastatin, 20 mg, Oral, Nightly  ticagrelor, 90 mg, Oral, BID      Continuous Infusions:        Result Review    Result Review:  I have personally reviewed the results from the time of this admission to 2025 08:22 EDT and agree with these findings:  [x]  Laboratory  []  Microbiology  [x]  Radiology  [x]  EKG/Telemetry   [x]  Cardiology/Vascular   []  Pathology  []  Old records  []  Other:  Most notable findings include:     CBC          2025    15:22 2025    02:10   CBC   WBC 7.56  7.29    RBC 4.38  3.66    Hemoglobin 14.0  11.8    Hematocrit 43.1  34.5    MCV 98.4  94.3    MCH 32.0  32.2    MCHC 32.5  34.2    RDW 14.7  14.6    Platelets 216  169      CMP          2025    15:22 2025    02:10   CMP   Glucose 131  122    BUN 14  10    Creatinine 0.76  0.69    EGFR 78.8  87.3    Sodium 138  139    Potassium 3.9  3.2     Chloride 102  104    Calcium 9.3  8.4    Total Protein 6.9  5.6    Albumin 4.3  3.7    Globulin 2.6  1.9    Total Bilirubin 0.5  0.3    Alkaline Phosphatase 77  67    AST (SGOT) 25  46    ALT (SGPT) 13  15    Albumin/Globulin Ratio 1.7  1.9    BUN/Creatinine Ratio 18.4  14.5    Anion Gap 13.8  10.2       CARDIAC LABS:      Lab 04/08/25  1745 04/08/25  1522   PROBNP  --  961.3   HSTROP T 383* 130*        Assessment & Plan   Assessment / Plan     Brief Patient Summary:  Yanni Vega is a 80 y.o. female with acute inferior ST elevation myocardial infarction treated with primary PCI and JACQUELINE of the right coronary artery, 3.0 33 mm Xience stent.  She has residual severe LAD and left circumflex artery stenosis.  She is cardiac wise stable without any recurrent angina or dyspnea.  Her EKG today showed sinus rhythm with resolution of ST elevations.    Active Hospital Problems:  Active Hospital Problems    Diagnosis     **STEMI (ST elevation myocardial infarction)            Plan:     I will continue with dual antiplatelet therapy with aspirin 81 mg oral daily and ticagrelor 90 minutes oral twice a day.  Continue with high-dose statins and beta-blockers as tolerated.  I will initiate Aldactone at low-dose.  She could be transferred out of the unit to telemetry britton for observation.  Initiate physical therapy.  Smoking cessation.       CODE STATUS:   Code Status (Patient has no pulse and is not breathing): CPR (Attempt to Resuscitate)  Medical Interventions (Patient has pulse or is breathing): Full Support  Level Of Support Discussed With: Patient      Electronically signed by Rinku Guy MD, 04/09/25, 8:22 AM EDT.

## 2025-04-09 NOTE — PLAN OF CARE
Goal Outcome Evaluation:  Plan of Care Reviewed With: patient        Progress: improving  Outcome Evaluation: Patient transferred to our unit from ICU this afternoon. Oriented to room and staff, no chest pain noted at this time, call light in reach. Family at bedside, all questions and concerns addressed by staff. No other issues at this time, plan of care continued.

## 2025-04-09 NOTE — PLAN OF CARE
Goal Outcome Evaluation:      VSS. TR Band removed without bleeding complications, no hematoma present. Slept off and on throughout the night. No c/o of pain. JONAS MEZA

## 2025-04-10 VITALS
DIASTOLIC BLOOD PRESSURE: 71 MMHG | SYSTOLIC BLOOD PRESSURE: 133 MMHG | HEIGHT: 62 IN | BODY MASS INDEX: 17.2 KG/M2 | WEIGHT: 93.47 LBS | OXYGEN SATURATION: 96 % | RESPIRATION RATE: 16 BRPM | TEMPERATURE: 97.7 F | HEART RATE: 64 BPM

## 2025-04-10 LAB
ALBUMIN SERPL-MCNC: 3.5 G/DL (ref 3.5–5.2)
ANION GAP SERPL CALCULATED.3IONS-SCNC: 9.9 MMOL/L (ref 5–15)
BASOPHILS # BLD AUTO: 0.02 10*3/MM3 (ref 0–0.2)
BASOPHILS NFR BLD AUTO: 0.4 % (ref 0–1.5)
BUN SERPL-MCNC: 13 MG/DL (ref 8–23)
BUN/CREAT SERPL: 18.6 (ref 7–25)
CALCIUM SPEC-SCNC: 8.4 MG/DL (ref 8.6–10.5)
CHLORIDE SERPL-SCNC: 104 MMOL/L (ref 98–107)
CO2 SERPL-SCNC: 25.1 MMOL/L (ref 22–29)
CREAT SERPL-MCNC: 0.7 MG/DL (ref 0.57–1)
DEPRECATED RDW RBC AUTO: 52.1 FL (ref 37–54)
EGFRCR SERPLBLD CKD-EPI 2021: 87 ML/MIN/1.73
EOSINOPHIL # BLD AUTO: 0.12 10*3/MM3 (ref 0–0.4)
EOSINOPHIL NFR BLD AUTO: 2.1 % (ref 0.3–6.2)
ERYTHROCYTE [DISTWIDTH] IN BLOOD BY AUTOMATED COUNT: 14.7 % (ref 12.3–15.4)
GLUCOSE SERPL-MCNC: 91 MG/DL (ref 65–99)
HCT VFR BLD AUTO: 33.6 % (ref 34–46.6)
HGB BLD-MCNC: 11.2 G/DL (ref 12–15.9)
IMM GRANULOCYTES # BLD AUTO: 0.01 10*3/MM3 (ref 0–0.05)
IMM GRANULOCYTES NFR BLD AUTO: 0.2 % (ref 0–0.5)
LYMPHOCYTES # BLD AUTO: 1.25 10*3/MM3 (ref 0.7–3.1)
LYMPHOCYTES NFR BLD AUTO: 22.3 % (ref 19.6–45.3)
MAGNESIUM SERPL-MCNC: 2.1 MG/DL (ref 1.6–2.4)
MCH RBC QN AUTO: 32 PG (ref 26.6–33)
MCHC RBC AUTO-ENTMCNC: 33.3 G/DL (ref 31.5–35.7)
MCV RBC AUTO: 96 FL (ref 79–97)
MONOCYTES # BLD AUTO: 0.73 10*3/MM3 (ref 0.1–0.9)
MONOCYTES NFR BLD AUTO: 13 % (ref 5–12)
NEUTROPHILS NFR BLD AUTO: 3.48 10*3/MM3 (ref 1.7–7)
NEUTROPHILS NFR BLD AUTO: 62 % (ref 42.7–76)
NRBC BLD AUTO-RTO: 0 /100 WBC (ref 0–0.2)
PHOSPHATE SERPL-MCNC: 2.8 MG/DL (ref 2.5–4.5)
PLATELET # BLD AUTO: 146 10*3/MM3 (ref 140–450)
PMV BLD AUTO: 10.5 FL (ref 6–12)
POTASSIUM SERPL-SCNC: 3.9 MMOL/L (ref 3.5–5.2)
RBC # BLD AUTO: 3.5 10*6/MM3 (ref 3.77–5.28)
SODIUM SERPL-SCNC: 139 MMOL/L (ref 136–145)
WBC NRBC COR # BLD AUTO: 5.61 10*3/MM3 (ref 3.4–10.8)

## 2025-04-10 PROCEDURE — 99239 HOSP IP/OBS DSCHRG MGMT >30: CPT | Performed by: INTERNAL MEDICINE

## 2025-04-10 PROCEDURE — 80069 RENAL FUNCTION PANEL: CPT | Performed by: INTERNAL MEDICINE

## 2025-04-10 PROCEDURE — 85025 COMPLETE CBC W/AUTO DIFF WBC: CPT | Performed by: INTERNAL MEDICINE

## 2025-04-10 PROCEDURE — 83735 ASSAY OF MAGNESIUM: CPT | Performed by: INTERNAL MEDICINE

## 2025-04-10 RX ORDER — METOPROLOL SUCCINATE 25 MG/1
12.5 TABLET, EXTENDED RELEASE ORAL DAILY
Qty: 30 TABLET | Refills: 6 | Status: SHIPPED | OUTPATIENT
Start: 2025-04-10

## 2025-04-10 RX ORDER — NITROGLYCERIN 0.4 MG/1
0.4 TABLET SUBLINGUAL
Qty: 45 TABLET | Refills: 12 | Status: SHIPPED | OUTPATIENT
Start: 2025-04-10

## 2025-04-10 RX ORDER — ASPIRIN 81 MG/1
81 TABLET ORAL DAILY
Qty: 30 TABLET | Refills: 6 | Status: SHIPPED | OUTPATIENT
Start: 2025-04-10

## 2025-04-10 RX ORDER — NICOTINE 21 MG/24HR
1 PATCH, TRANSDERMAL 24 HOURS TRANSDERMAL
Qty: 14 PATCH | Refills: 1 | Status: SHIPPED | OUTPATIENT
Start: 2025-04-10

## 2025-04-10 RX ORDER — ROSUVASTATIN CALCIUM 20 MG/1
20 TABLET, COATED ORAL NIGHTLY
Qty: 90 TABLET | Refills: 3 | Status: SHIPPED | OUTPATIENT
Start: 2025-04-10

## 2025-04-10 RX ADMIN — MUPIROCIN 1 APPLICATION: 20 OINTMENT TOPICAL at 08:48

## 2025-04-10 RX ADMIN — POTASSIUM CHLORIDE 30 MEQ: 750 CAPSULE, EXTENDED RELEASE ORAL at 08:47

## 2025-04-10 RX ADMIN — METOPROLOL SUCCINATE 12.5 MG: 25 TABLET, FILM COATED, EXTENDED RELEASE ORAL at 08:48

## 2025-04-10 RX ADMIN — TICAGRELOR 90 MG: 90 TABLET ORAL at 08:48

## 2025-04-10 RX ADMIN — NICOTINE 1 PATCH: 21 PATCH, EXTENDED RELEASE TRANSDERMAL at 08:48

## 2025-04-10 RX ADMIN — ASPIRIN 81 MG: 81 TABLET, COATED ORAL at 08:48

## 2025-04-10 NOTE — DISCHARGE SUMMARY
[facility]         CARDIOLOGY DISCHARGE SUMMARY    Patient Name: Yanni Vega  : 1945  MRN: [id number]    Date of Admission: 2025  Date of Discharge:  4/10/2025  Primary Care Physician: Carmen yAon MD    Consults       No orders found from 3/10/2025 to 2025.            Presenting Problem:   ST elevation myocardial infarction (STEMI), unspecified artery [I21.3]  STEMI (ST elevation myocardial infarction) [I21.3]    Active and Resolved Hospital Problems:  Active Hospital Problems    Diagnosis POA    **STEMI (ST elevation myocardial infarction) [I21.3] Yes      Resolved Hospital Problems   No resolved problems to display.         Hospital Course     Hospital Course:  Yanni Vega is a 80 y.o. female with past medical history of essential hypertension, mixed lipidemia and COPD.  The patient presented to the emergency room with acute ST elevation inferior wall myocardial infarction.  Emergent coronary angiography showed thrombotic occlusion of the right coronary artery treated with drug-eluting stent, 3.0 33 mm Xience stent.  She had residual stenosis of the left anterior descending coronary artery and circumflex artery over 80%.  Arctic function showed EF of 60 to 65% and mild mitral valve insufficiency with mild hypokinesis of the inferior wall.  She she was observed for 48 hours without recurrent symptoms.  He remains cardiac wise stable.    DISCHARGE Follow Up Recommendations for labs and diagnostics: The patient should be seen in cardiology clinic within 7 days upon discharge.  Continue with dual antiplatelet therapy for minimum of 1 year.  She will be reevaluated and referred to initiate cardiac rehab as an outpatient.      Day of Discharge     Vital Signs:  Temp:  [97.3 °F (36.3 °C)-98.1 °F (36.7 °C)] 97.3 °F (36.3 °C)  Heart Rate:  [66-85] 76  Resp:  [15-25] 18  BP: ()/(49-78) 141/78  Physical Exam:    Alert  Heart. Regular, no gallops, no rubs  Lungs: no rales,  no wheezing  LE: no edema  Neurologic. No motor deficits.  Pertinent  and/or Most Recent Results     LAB RESULTS:      Lab 04/10/25  0600 25  0210 25  1522   WBC 5.61 7.29 7.56   HEMOGLOBIN 11.2* 11.8* 14.0   HEMATOCRIT 33.6* 34.5 43.1   PLATELETS 146 169 216   NEUTROS ABS 3.48  --  4.48   IMMATURE GRANS (ABS) 0.01  --  0.03   LYMPHS ABS 1.25  --  2.19   MONOS ABS 0.73  --  0.75   EOS ABS 0.12  --  0.08   MCV 96.0 94.3 98.4*         Lab 04/10/25  0600 25  0210 25  1522   SODIUM 139 139 138   POTASSIUM 3.9 3.2* 3.9   CHLORIDE 104 104 102   CO2 25.1 24.8 22.2   ANION GAP 9.9 10.2 13.8   BUN 13 10 14   CREATININE 0.70 0.69 0.76   EGFR 87.0 87.3 78.8   GLUCOSE 91 122* 131*   CALCIUM 8.4* 8.4* 9.3   MAGNESIUM 2.1 1.8 2.0   PHOSPHORUS 2.8 3.7  --    HEMOGLOBIN A1C  --   --  5.60   TSH  --   --  1.360         Lab 04/10/25  0600 25  0210 25  1522   TOTAL PROTEIN  --  5.6* 6.9   ALBUMIN 3.5 3.7 4.3   GLOBULIN  --  1.9 2.6   ALT (SGPT)  --  15 13   AST (SGOT)  --  46* 25   BILIRUBIN  --  0.3 0.5   ALK PHOS  --  67 77   LIPASE  --   --  25         Lab 25  1745 25  1522   PROBNP  --  961.3   HSTROP T 383* 130*         Lab 25  0210   CHOLESTEROL 178   LDL CHOL 93   HDL CHOL 67*   TRIGLYCERIDES 101             Brief Urine Lab Results       None          Microbiology Results (last 10 days)       ** No results found for the last 240 hours. **            Cardiac Catheterization/Vascular Study  Addendum Date: 2025  [facility] CARDIAC CATHETERIZATION PROCEDURE REPORT ACUTE ST ELEVATION MYOCARDIAL INFARCTION. Patient: Yanni Vega : 1945 MRN: [id number] Procedure Date: 25 Referring Physician: Rinku Guy MD Interventional Cardiologist: Rinku Guy MD, [facility] Indication  Acute  ST elevation inferior lateral wall myocardial infarction Clinical Presentation: 80-year-old female with acute inferior wall myocardial infarction.  He has chest pain  associated with dyspnea for last 4 days.  EKG showed acute inferior wall ST elevation myocardial infarction. Procedure performed: Diagnostic Left Heart Catheterization Coronary Angiography Left Ventriculography Successful percutaneous coronary intervention to the Right Coronary Artery  with JACQUELINE 3.0 33 mm Xience stent Access Site(s): Right radial Findings: 1. Coronary Artery Anatomy: Dominance: Right Left Main: Calcification Left Anterior Descendin% proximal stenosis involving the first large diagonal branch.  Mid LAD have 75% eccentric stenosis.  Flow was CATRACHITA-3 Left Circumflex Artery: Calcified 85% proximal stenosis.  Distal second marginal branch have mild disease. Right Coronary Artery: Large dominant vessel with 100% mid segment thrombotic occlusion.  CATRACHITA 0 flow 2. Hemodynamics:    The opening aortic pressure is 120/80 mmHg. The left ventricular end-diastolic pressure is 22 mmHg. There was no gradient across aortic valve on pullback  3. Left Ventriculogram: Ejection Fraction: 65% Wall Motion: Hypokinesis of the inferior wall Mitral Regurgitation: Mild 4. Percutaneous Intervention: Location: RCA Treatment: JACQUELINE 3.0 33 mm XIENCE Stent. Pre-stenosis: 100 % Post-stenosis: 0 % Lesion Type C: N CATRACHITA Flow Pre: O CATRACHITA Flow Post: 3 Bifurcation: N Severe Calcium: N Dissection: No Conclusions: Right Coronary Artery: Large dominant vessel with 100% mid segment thrombotic occlusion.  CATRACHITA 0 flow s/p PCI with JACQUELINE 3.0 33 mm XIENCE Stent, 0 % residual stenosis, post CATRACHITA 3 flow. Recommendations: Dual antiplatelet therapy with aspirin 81 mg oral daily and ticagrelor 90 mg oral twice a day for minimum of 1 year. High dose  statin therapy, beta-blockers and blood pressure control. Consider revascularization of the LAD and circumflex artery, CABG versus high risk PCI as an outpatient Procedure Status: Completed. Details of the procedure: Informed consent was obtained with an explanation of the risks, benefits and alternatives  of the procedure. The patient was brought to the Cardiac Catheterization Laboratory and was prepped and draped in a standard sterile fashion. Moderate sedation with Fentanyl and Versed was administered by the circulating nurse. Lidocaine 2% was used to anesthetize the Right  radial artery and a 5/6 Slender sheath was placed.   A 6 F JL 4.0, JR 3.5 guide catheters used.  This catheter was used to engage the right and left coronary arteries with diagnostic angiography obtained in all appropriate projections.  We then exchanged for an angled pigtail catheter and enter the left ventricle to measure hemodynamics and perform a left ventriculogram.  The catheter was then removed over a guidewire. Details of angioplasty: After the clinical and angiographic data decision was made to proceed with PCI of the right coronary artery.  Angiomax was given IV push followed by infusion.  6 Turkmen 3 DRC guide catheter was used to engage the right coronary artery and a BMW wire was able to advance and crossed the occlusion site and placed distally.  The stenotic segment with thrombus was dilated with a 2.5 x 20 mm balloon to 16 breanne and stented with a drug-eluting 3.0 x 33 mm Xience stent deployed at 18 breanne and postdilated with the stent balloon to 22 breanne with excellent stent apposition and results, no residual stenosis and post CATRACHITA-3 flow.  The ST segment resolved the patient was chest pain-free. Heparin was used for anticoagulation throughout the procedure with frequent checking of ACT.  Patient was already on aspirin.  Ticagrelor   180 mg oral given.  At the end of the procedure, the radial artery sheath was removed and TR band was applied for hemostasis.  Patient tolerated procedure well without any complications.  The results of the test were explained in detail to the patient. Cumulative fluoroscopy time: 16 min Cumulative air kerma: 215 mGy Total amount of contrast used: 100 ml of Isovue Complications: None. Estimated Blood  Loss: Minimal. Rinku Guy MD, [facility] 04/08/25 16:48 EDT    XR Chest 1 View  Result Date: 4/8/2025  Impression: Impression: 1. No acute process. 2. Emphysema with scarring at the lung apices similar to prior study. Electronically Signed: Jonas Costa MD  4/8/2025 6:22 PM EDT  Workstation ID: [id number]              Results for orders placed during the hospital encounter of 04/08/25    Adult Transthoracic Echo Complete W/ Cont if Necessary Per Protocol    Interpretation Summary    Left ventricular ejection fraction appears to be 61 - 65%.    Left ventricular wall thickness is consistent with mild to moderate concentric hypertrophy.    Left ventricular diastolic function is consistent with (grade I) impaired relaxation.    The left atrial cavity is mildly dilated.    There are myxomatous changes of the mitral valve apparatus present.    Estimated right ventricular systolic pressure from tricuspid regurgitation is moderately elevated (45-55 mmHg).      Labs Pending at Discharge:        Discharge Details        Discharge Medications        New Medications        Instructions Start Date   aspirin 81 MG EC tablet   81 mg, Oral, Daily      metoprolol succinate XL 25 MG 24 hr tablet  Commonly known as: TOPROL-XL   12.5 mg, Oral, Daily      nicotine 21 MG/24HR patch  Commonly known as: NICODERM CQ   1 patch, Transdermal, Every 24 Hours Scheduled      nitroglycerin 0.4 MG SL tablet  Commonly known as: NITROSTAT   0.4 mg, Sublingual, Every 5 Minutes PRN, Take no more than 3 doses in 15 minutes.      rosuvastatin 20 MG tablet  Commonly known as: CRESTOR   20 mg, Oral, Nightly      ticagrelor 90 MG tablet tablet  Commonly known as: BRILINTA   90 mg, Oral, 2 Times Daily             Continue These Medications        Instructions Start Date   cholecalciferol 25 MCG (1000 UT) tablet  Commonly known as: VITAMIN D3   1,000 Units, Daily      docusate sodium 100 MG capsule  Commonly known as: COLACE   100 mg, Daily      ibandronate 150  MG tablet  Commonly known as: BONIVA   150 mg, Every 30 Days      ProAir RespiClick 108 (90 Base) MCG/ACT inhaler  Generic drug: albuterol   2 puffs, Every 6 Hours PRN      Trelegy Ellipta 100-62.5-25 MCG/INH inhaler  Generic drug: Fluticasone-Umeclidin-Vilant   1 puff, Inhalation, Daily             ASK your doctor about these medications        Instructions Start Date   amLODIPine 5 MG tablet  Commonly known as: NORVASC   5 mg, Daily               Allergies   Allergen Reactions    Iodine Anaphylaxis    Lisinopril Swelling         Discharge Disposition:  Home or Self Care    Diet:  Hospital:  Diet Order   Procedures    Diet: Cardiac; Healthy Heart (2-3 Na+); Fluid Consistency: Thin (IDDSI 0)         Discharge Activity:     May walk daily for 30 minutes. No strenuous exercise.   Avoid smoking.    CODE STATUS:  Code Status and Medical Interventions: CPR (Attempt to Resuscitate); Full Support   Ordered at: 04/08/25 1723     Code Status (Patient has no pulse and is not breathing):    CPR (Attempt to Resuscitate)     Medical Interventions (Patient has pulse or is breathing):    Full Support     Level Of Support Discussed With:    Patient         No future appointments.    Follow up Cardiology clinic within 7 days upon discharge.     Time spent on Discharge including face to face service:  35 minutes    Electronically signed by Rinku Guy MD, 04/10/25, 8:28 AM EDT.

## 2025-04-10 NOTE — PLAN OF CARE
Goal Outcome Evaluation:  Plan of Care Reviewed With: patient        Progress: improving  Outcome Evaluation: Patient has returned to baseline, no complaints of any chest pain, heartburn or radiating pain. Family at bedside, patient ambulates to the bathroom with standby assist. Ready to go home.

## 2025-04-10 NOTE — PLAN OF CARE
Problem: Adult Inpatient Plan of Care  Goal: Absence of Hospital-Acquired Illness or Injury  Intervention: Identify and Manage Fall Risk  Recent Flowsheet Documentation  Taken 4/10/2025 0350 by Marisel Brandt RN  Safety Promotion/Fall Prevention:   safety round/check completed   nonskid shoes/slippers when out of bed   lighting adjusted   clutter free environment maintained   assistive device/personal items within reach  Taken 4/10/2025 0145 by Marisel Brandt RN  Safety Promotion/Fall Prevention:   safety round/check completed   nonskid shoes/slippers when out of bed   lighting adjusted   fall prevention program maintained   assistive device/personal items within reach   clutter free environment maintained  Taken 4/9/2025 2303 by Marisel Brandt RN  Safety Promotion/Fall Prevention:   safety round/check completed   room organization consistent   lighting adjusted   fall prevention program maintained   clutter free environment maintained   assistive device/personal items within reach  Taken 4/9/2025 1945 by Marisel Brandt RN  Safety Promotion/Fall Prevention:   safety round/check completed   room organization consistent   nonskid shoes/slippers when out of bed   lighting adjusted   clutter free environment maintained   assistive device/personal items within reach   fall prevention program maintained     Problem: Cardiac Catheterization (Diagnostic/Interventional)  Goal: Absence of Bleeding  Outcome: Progressing     Problem: Cardiac Catheterization (Diagnostic/Interventional)  Goal: Optimal Pain Control and Function  Outcome: Progressing  Intervention: Prevent or Manage Pain  Recent Flowsheet Documentation  Taken 4/9/2025 1945 by Marisel Brandt RN  Diversional Activities:   television   tablet   Goal Outcome Evaluation:  Plan of Care Reviewed With: patient        Progress: improving  Outcome Evaluation: Plan of care continued, no untoward events throughout shift.

## 2025-04-11 ENCOUNTER — READMISSION MANAGEMENT (OUTPATIENT)
Dept: CALL CENTER | Facility: HOSPITAL | Age: 80
End: 2025-04-11
Payer: MEDICARE

## 2025-04-11 NOTE — OUTREACH NOTE
Prep Survey      Flowsheet Row Responses   [facility] facility patient discharged from? [facility]   Is LACE score < 7 ? Yes   Eligibility Readm Mgmt   Discharge diagnosis STEMI (ST elevation myocardial infarction)  [Left heart cath this visit.]   Does the patient have one of the following disease processes/diagnoses(primary or secondary)? Acute MI (STEMI,NSTEMI)   Does the patient have Home health ordered? No   Is there a DME ordered? No   Medication alerts for this patient see AVS   Prep survey completed? Yes            Carolyn COLLAZO - Registered Nurse              Carolyn COLLAZO - Registered Nurse

## 2025-04-12 NOTE — PROGRESS NOTES
Enter Query Response Below      Query Response: Malnutrition due to poor caloric intake             If applicable, please update the problem list.

## 2025-04-15 LAB
QT INTERVAL: 391 MS
QT INTERVAL: 431 MS
QT INTERVAL: 446 MS
QTC INTERVAL: 410 MS
QTC INTERVAL: 468 MS
QTC INTERVAL: 504 MS

## 2025-04-16 ENCOUNTER — READMISSION MANAGEMENT (OUTPATIENT)
Dept: CALL CENTER | Facility: HOSPITAL | Age: 80
End: 2025-04-16
Payer: MEDICARE

## 2025-04-16 NOTE — OUTREACH NOTE
AMI Week 1 Survey      Flowsheet Row Responses   [facility] patient discharged from? [facility]   Does the patient have one of the following disease processes/diagnoses(primary or secondary)? Acute MI (STEMI,NSTEMI)   Week 1 attempt successful? Yes   Call start time 1438   Call end time 1455   Discharge diagnosis STEMI (ST elevation myocardial infarction)   Is patient permission given to speak with other caregiver? Yes   List who call center can speak with Alivia   Person spoke with today (if not patient) and relationship Alivia   Meds reviewed with patient/caregiver? Yes   Is the patient having any side effects they believe may be caused by any medication additions or changes? Yes   Side effects comments  fatigue   Does the patient have all prescriptions related to this admission filled (includes statins,anticoagulants,HTN meds,anti-arrhythmia meds) Yes   Is the patient taking all medications as directed (includes completed medication regime)? Yes   Does the patient have a primary care provider?  Yes   Does the patient have an appointment with their PCP,cardiologist,or clinic within 7 days of discharge? Yes   Has the patient kept scheduled appointments due by today? No   What is preventing the patient from keeping their appointments? --  [Pt has rescheduled appt for 4-17-25 per GYelenadtdebbie]   Nursing Interventions Educated on importance of keeping appointment   Comments Per Alivia's report the pt has been fatigued to the point that she does not get out of bed and minimally eats and drinks. Alivia reports the pt mainly has been taking in peach tea and coffee. RN educaed G.dtr on importance of non-caffeinated beverages and more than one cup of hydration daily. G.dtr has been trying to get pt to eat, drink BOOST or protein shakes but pt declines, c/o fatigue. This nurse recommended they monitor and log pt's BP today and in the morning, taking readings to PCP appt, write list of concerns reviewing the pt's lack of  intake with PCP and her fatigue/lethargy, G.dtr v/u. Pt has denied chest pain, SOA or dizziness per Alivia.   Did the patient receive a copy of their discharge instructions? Yes   Nursing interventions Reviewed instructions with patient   What is the patient's perception of their health status since discharge? Worsening   Nursing interventions Nurse provided patient education   Is the patient/caregiver able to teach back signs and symptoms of when to call for help immediately: Sudden chest discomfort, Sudden discomfort in arms, back, neck or jaw, Sudden sweating or clammy skin, Nausea or vomiting   Nursing interventions Nurse provided patient education   Is the patient/caregiver able to teach back lifestyle changes to help prevent MIs Heart healthy diet, Quit smoking   Is the patient/caregiver able to teach back ways to prevent a second heart attack: Take medications   If the patient is a current smoker, are they able to teach back resources for cessation? --  [Pt smokes, but has not smoked since DC]   Is the patient/caregiver able to teach back the hierarchy of who to call/visit for symptoms/problems? PCP, Specialist, Home health nurse, Urgent Care, ED, 911 Yes   Week 1 call completed? Yes   Call end time 5923            Aletha ZHAO - Registered Nurse

## 2025-04-24 ENCOUNTER — READMISSION MANAGEMENT (OUTPATIENT)
Dept: CALL CENTER | Facility: HOSPITAL | Age: 80
End: 2025-04-24
Payer: MEDICARE

## 2025-04-24 NOTE — OUTREACH NOTE
AMI Week 2 Survey      Flowsheet Row Responses   [facility] patient discharged from? [facility]   Does the patient have one of the following disease processes/diagnoses(primary or secondary)? Acute MI (STEMI,NSTEMI)   Week 2 attempt successful? Yes   Call start time 1332   Call end time 1338   Discharge diagnosis STEMI (ST elevation myocardial infarction)   Person spoke with today (if not patient) and relationship Patient   Meds reviewed with patient/caregiver? Yes   Is the patient having any side effects they believe may be caused by any medication additions or changes? No   Does the patient have all prescriptions related to this admission filled (includes statins,anticoagulants,HTN meds,anti-arrhythmia meds) Yes   Is the patient taking all medications as directed (includes completed medication regime)? Yes   Comments regarding appointments Patient has a Cardiology followup on 4/28   Does the patient have a primary care provider?  Yes   Does the patient have an appointment with their PCP,cardiologist,or clinic within 7 days of discharge? Yes   Has the patient kept scheduled appointments due by today? Yes   Psychosocial issues? No   Did the patient receive a copy of their discharge instructions? Yes   Nursing interventions Reviewed instructions with patient   What is the patient's perception of their health status since discharge? Same   Nursing interventions Nurse provided patient education   Is the patient/caregiver able to teach back signs and symptoms of when to call for help immediately: Sudden chest discomfort, Sudden discomfort in arms, back, neck or jaw, Shortness of breath at any time, Irregular or rapid heart rate, Dizziness or lightheadedness, Sudden sweating or clammy skin, Nausea or vomiting   Nursing interventions Nurse provided patient education   Is the patient/caregiver able to teach back ways to prevent a second heart attack: Take medications, Follow up with MD   Is the patient/caregiver able to  teach back the hierarchy of who to call/visit for symptoms/problems? PCP, Specialist, Home health nurse, Urgent Care, ED, 911 Yes   Week 2 call completed? Yes   Is the patient interested in additional calls from an ambulatory ? No   Would this patient benefit from a Referral to [facility] Social Work? No   Wrap up additional comments Patient reports she is still having some SOB. If worsening of symptoms Patient will return to ED. She denies chest pain or edema.   Call end time 1338            Jas MARIN - Registered Nurse

## 2025-04-28 ENCOUNTER — OFFICE VISIT (OUTPATIENT)
Dept: CARDIOLOGY | Facility: CLINIC | Age: 80
End: 2025-04-28
Payer: MEDICARE

## 2025-04-28 VITALS
DIASTOLIC BLOOD PRESSURE: 31 MMHG | BODY MASS INDEX: 17.11 KG/M2 | HEIGHT: 62 IN | WEIGHT: 93 LBS | SYSTOLIC BLOOD PRESSURE: 103 MMHG | HEART RATE: 76 BPM

## 2025-04-28 DIAGNOSIS — E78.5 HYPERLIPIDEMIA LDL GOAL <70: ICD-10-CM

## 2025-04-28 DIAGNOSIS — I25.708 CORONARY ARTERY DISEASE INVOLVING CORONARY BYPASS GRAFT OF NATIVE HEART WITH OTHER FORMS OF ANGINA PECTORIS: Primary | ICD-10-CM

## 2025-04-28 DIAGNOSIS — Z95.5 S/P CORONARY ARTERY STENT PLACEMENT: ICD-10-CM

## 2025-04-28 NOTE — PROGRESS NOTES
Chief Complaint  Follow-up, Fatigue, Shortness of Breath, and Dizziness    Subjective        History of Present Illness  Yanni Vega presents to [facility] CARDIOLOGY   Ms. Vega is an 80-year-old female coming in today for hospital follow-up.  She has a medical history of hypertension, hyperlipidemia and COPD.  She recently presented to the hospital with chest pain, was found to have an acute inferior STEMI, and underwent emergent catheterization with JACQUELINE to the RCA.  She has known residual disease in the LAD and circumflex.  She continues to feel short of breath, and fatigue.  If she tries to exert herself she even feels mildly dizzy.  Tolerating DAPT therapy without any bleeding issues.  Blood pressure especially diastolic pressure low in the office today, uncertain what she is running at home.        Past Medical History:   Diagnosis Date    Arthritis     COPD (chronic obstructive pulmonary disease)     Emphysema lung     Hypertension     Osteoporosis        Allergies   Allergen Reactions    Iodine Anaphylaxis    Lisinopril Swelling        Past Surgical History:   Procedure Laterality Date    CARDIAC CATHETERIZATION N/A 4/8/2025    Procedure: Left Heart Cath;  Surgeon: Rinku Azevedo MD;  Location: [facility] CATH INVASIVE LOCATION;  Service: Cardiovascular;  Laterality: N/A;    KNEE ARTHROPLASTY Left     ORTHOPEDIC SURGERY      knee        Social History  She  reports that she has been smoking cigarettes. She has a 25 pack-year smoking history. She has never used smokeless tobacco. She reports current alcohol use of about 1.0 standard drink of alcohol per week. She reports that she does not use drugs.    Family History  Her Family history is unknown by patient.       Current Outpatient Medications on File Prior to Visit   Medication Sig    amLODIPine (NORVASC) 5 MG tablet Take 1 tablet by mouth Daily.    aspirin 81 MG EC tablet Take 1 tablet by mouth Daily.    Cholecalciferol 25 MCG (1000  "UT) tablet Take 1 tablet by mouth Daily.    docusate sodium (COLACE) 100 MG capsule Take 1 capsule by mouth Daily.    ibandronate (BONIVA) 150 MG tablet Take 1 tablet by mouth Every 30 (Thirty) Days.    metoprolol succinate XL (TOPROL-XL) 25 MG 24 hr tablet Take 0.5 tablets by mouth Daily.    nicotine (NICODERM CQ) 21 MG/24HR patch Place 1 patch on the skin as directed by provider Daily.    nitroglycerin (NITROSTAT) 0.4 MG SL tablet Place 1 tablet under the tongue Every 5 (Five) Minutes As Needed for Chest Pain (Systolic BP Greater Than 100). Take no more than 3 doses in 15 minutes.    ProAir RespiClick 108 (90 Base) MCG/ACT inhaler Inhale 2 puffs Every 6 (Six) Hours As Needed for Wheezing or Shortness of Air.    rosuvastatin (CRESTOR) 20 MG tablet Take 1 tablet by mouth Every Night.    ticagrelor (BRILINTA) 90 MG tablet tablet Take 1 tablet by mouth 2 (Two) Times a Day.    Trelegy Ellipta 100-62.5-25 MCG/INH inhaler Inhale 1 puff Daily.     No current facility-administered medications on file prior to visit.         Review of Systems     Objective   Vitals:    04/28/25 1408   BP: (!) 103/31   Pulse: 76   Weight: 42.2 kg (93 lb)   Height: 157.5 cm (62.01\")         Physical Exam  General : Alert, awake, no acute distress  Neck : Supple, no carotid bruit, no jugular venous distention  CVS : Regular rate and rhythm, no murmur, no rubs or gallops  Lungs: Clear to auscultation bilaterally, no crackles or rhonchi  Abdomen: Soft, nontender, bowel sounds active  Extremities: Warm, well-perfused, no pedal edema      Result Review     The following data was reviewed by LASHONDA Chowdhury  proBNP   Date Value Ref Range Status   04/08/2025 961.3 0.0 - 1,800.0 pg/mL Final     CMP          4/8/2025    15:22 4/9/2025    02:10 4/10/2025    06:00   CMP   Glucose 131  122  91    BUN 14  10  13    Creatinine 0.76  0.69  0.70    EGFR 78.8  87.3  87.0    Sodium 138  139  139    Potassium 3.9  3.2  3.9    Chloride 102  104  104  " "  Calcium 9.3  8.4  8.4    Total Protein 6.9  5.6     Albumin 4.3  3.7  3.5    Globulin 2.6  1.9     Total Bilirubin 0.5  0.3     Alkaline Phosphatase 77  67     AST (SGOT) 25  46     ALT (SGPT) 13  15     Albumin/Globulin Ratio 1.7  1.9     BUN/Creatinine Ratio 18.4  14.5  18.6    Anion Gap 13.8  10.2  9.9      CBC w/diff          4/8/2025    15:22 4/9/2025    02:10 4/10/2025    06:00   CBC w/Diff   WBC 7.56  7.29  5.61    RBC 4.38  3.66  3.50    Hemoglobin 14.0  11.8  11.2    Hematocrit 43.1  34.5  33.6    MCV 98.4  94.3  96.0    MCH 32.0  32.2  32.0    MCHC 32.5  34.2  33.3    RDW 14.7  14.6  14.7    Platelets 216  169  146    Neutrophil Rel % 59.2   62.0    Immature Granulocyte Rel % 0.4   0.2    Lymphocyte Rel % 29.0   22.3    Monocyte Rel % 9.9   13.0    Eosinophil Rel % 1.1   2.1    Basophil Rel % 0.4   0.4       Lab Results   Component Value Date    TSH 1.360 04/08/2025      No results found for: \"FREET4\"   No results found for: \"DDIMERQUANT\"  Magnesium   Date Value Ref Range Status   04/10/2025 2.1 1.6 - 2.4 mg/dL Final      No results found for: \"DIGOXIN\"   Lab Results   Component Value Date    TROPONINT 383 (C) 04/08/2025           Lipid Panel          4/9/2025    02:10   Lipid Panel   Total Cholesterol 178    Triglycerides 101    HDL Cholesterol 67    VLDL Cholesterol 18    LDL Cholesterol  93    LDL/HDL Ratio 1.36          Results for orders placed during the hospital encounter of 04/08/25    Adult Transthoracic Echo Complete W/ Cont if Necessary Per Protocol    Interpretation Summary    Left ventricular ejection fraction appears to be 61 - 65%.    Left ventricular wall thickness is consistent with mild to moderate concentric hypertrophy.    Left ventricular diastolic function is consistent with (grade I) impaired relaxation.    The left atrial cavity is mildly dilated.    There are myxomatous changes of the mitral valve apparatus present.    Estimated right ventricular systolic pressure from tricuspid " regurgitation is moderately elevated (45-55 mmHg).  .      Cardiac catheterization   4/8/2025  Right Coronary Artery: Large dominant vessel with 100% mid segment thrombotic occlusion.  CATRACHITA 0 flow s/p PCI with JACQUELINE 3.0 33 mm XIENCE Stent, 0 % residual stenosis, post CATRACHITA 3 flow.      Recommendations:   Dual antiplatelet therapy with aspirin 81 mg oral daily and ticagrelor 90 mg oral twice a day for minimum of 1 year.  High dose  statin therapy, beta-blockers and blood pressure control.  Consider revascularization of the LAD and circumflex artery, CABG versus high risk PCI as an outpatient            Assessment and Plan   Diagnoses and all orders for this visit:    1. Coronary artery disease involving coronary bypass graft of native heart with other forms of angina pectoris (Primary)    2. S/P coronary artery stent placement    3. Hyperlipidemia LDL goal <70      CAD-she underwent coronary stenting, and is tolerating DAPT therapy okay, but continues to have anginal symptoms.  She has known residual disease, we will go ahead and move forward with staged angioplasty.    Blood pressure -blood pressure, especially diastolic is low in the office today.  Encouraged her to take low-dose amlodipine and metoprolol at different times of the day, and monitor home BP for the next few days, if she is persistently this low then I would decrease or stop the amlodipine, but we will hold off as both medications can give her some antianginal symptom relief.    HDL not at goal at index presentation, we will continue current dose rosuvastatin 20 mg nightly and reevaluate in the next few weeks.      Follow Up   Return for Will schedule after testing completed- [facility].    Patient was given instructions and counseling regarding her condition or for health maintenance advice. Please see specific information pulled into the AVS if appropriate.     Signed,  Joleen Monk, APRN  04/28/2025     Dictated Utilizing Dragon Dictation: Please note that  portions of this note were completed with a voice recognition program.  Part of this note may be an electronic transcription/translation of spoken language to printed text using the Dragon Dictation System.

## 2025-05-01 ENCOUNTER — PREP FOR SURGERY (OUTPATIENT)
Dept: OTHER | Facility: HOSPITAL | Age: 80
End: 2025-05-01
Payer: MEDICARE

## 2025-05-01 DIAGNOSIS — I25.708 CORONARY ARTERY DISEASE INVOLVING CORONARY BYPASS GRAFT OF NATIVE HEART WITH OTHER FORMS OF ANGINA PECTORIS: Primary | ICD-10-CM

## 2025-05-01 PROBLEM — I25.10 CORONARY ARTERY DISEASE: Status: ACTIVE | Noted: 2025-05-01

## 2025-05-01 RX ORDER — SODIUM CHLORIDE 0.9 % (FLUSH) 0.9 %
10 SYRINGE (ML) INJECTION AS NEEDED
OUTPATIENT
Start: 2025-05-01

## 2025-05-01 RX ORDER — NITROGLYCERIN 0.4 MG/1
0.4 TABLET SUBLINGUAL
OUTPATIENT
Start: 2025-05-01

## 2025-05-01 RX ORDER — SODIUM CHLORIDE 0.9 % (FLUSH) 0.9 %
10 SYRINGE (ML) INJECTION EVERY 12 HOURS SCHEDULED
OUTPATIENT
Start: 2025-05-01

## 2025-05-01 RX ORDER — SODIUM CHLORIDE 9 MG/ML
40 INJECTION, SOLUTION INTRAVENOUS AS NEEDED
OUTPATIENT
Start: 2025-05-01

## 2025-05-01 RX ORDER — DIPHENHYDRAMINE HYDROCHLORIDE 50 MG/ML
50 INJECTION, SOLUTION INTRAMUSCULAR; INTRAVENOUS
OUTPATIENT
Start: 2025-05-01 | End: 2025-05-01

## 2025-05-01 RX ORDER — PREDNISONE 50 MG/1
50 TABLET ORAL TAKE AS DIRECTED
Qty: 3 TABLET | Refills: 0 | Status: SHIPPED | OUTPATIENT
Start: 2025-05-01

## 2025-05-01 RX ORDER — DIPHENHYDRAMINE HCL 50 MG
50 CAPSULE ORAL
OUTPATIENT
Start: 2025-05-01 | End: 2025-05-01

## 2025-05-13 ENCOUNTER — HOSPITAL ENCOUNTER (INPATIENT)
Facility: HOSPITAL | Age: 80
LOS: 1 days | Discharge: ANOTHER HOSPITAL | DRG: 378 | End: 2025-05-14
Attending: EMERGENCY MEDICINE | Admitting: INTERNAL MEDICINE
Payer: MEDICARE

## 2025-05-13 ENCOUNTER — TELEPHONE (OUTPATIENT)
Dept: CARDIOLOGY | Facility: CLINIC | Age: 80
End: 2025-05-13
Payer: MEDICARE

## 2025-05-13 ENCOUNTER — APPOINTMENT (OUTPATIENT)
Dept: GENERAL RADIOLOGY | Facility: HOSPITAL | Age: 80
DRG: 378 | End: 2025-05-13
Payer: MEDICARE

## 2025-05-13 DIAGNOSIS — D62 ACUTE BLOOD LOSS ANEMIA (ABLA): ICD-10-CM

## 2025-05-13 DIAGNOSIS — R06.02 SHORTNESS OF BREATH: ICD-10-CM

## 2025-05-13 DIAGNOSIS — K92.2 ACUTE GI BLEEDING: ICD-10-CM

## 2025-05-13 DIAGNOSIS — D58.2 SIGNIFICANT DROP IN HEMOGLOBIN: ICD-10-CM

## 2025-05-13 DIAGNOSIS — K92.1 MELENA: ICD-10-CM

## 2025-05-13 DIAGNOSIS — D64.9 ACUTE ANEMIA: Primary | ICD-10-CM

## 2025-05-13 LAB
ABO GROUP BLD: NORMAL
ABO GROUP BLD: NORMAL
ALBUMIN SERPL-MCNC: 4.2 G/DL (ref 3.5–5.2)
ALBUMIN/GLOB SERPL: 2.1 G/DL
ALP SERPL-CCNC: 109 U/L (ref 39–117)
ALT SERPL W P-5'-P-CCNC: 13 U/L (ref 1–33)
ANION GAP SERPL CALCULATED.3IONS-SCNC: 12.4 MMOL/L (ref 5–15)
AST SERPL-CCNC: 23 U/L (ref 1–32)
BASOPHILS # BLD AUTO: 0.04 10*3/MM3 (ref 0–0.2)
BASOPHILS NFR BLD AUTO: 0.4 % (ref 0–1.5)
BILIRUB SERPL-MCNC: 0.5 MG/DL (ref 0–1.2)
BLD GP AB SCN SERPL QL: NEGATIVE
BUN SERPL-MCNC: 10 MG/DL (ref 8–23)
BUN/CREAT SERPL: 11.2 (ref 7–25)
CALCIUM SPEC-SCNC: 9 MG/DL (ref 8.6–10.5)
CHLORIDE SERPL-SCNC: 107 MMOL/L (ref 98–107)
CO2 SERPL-SCNC: 19.6 MMOL/L (ref 22–29)
CREAT SERPL-MCNC: 0.89 MG/DL (ref 0.57–1)
DEPRECATED RDW RBC AUTO: 56.6 FL (ref 37–54)
EGFRCR SERPLBLD CKD-EPI 2021: 65.2 ML/MIN/1.73
EOSINOPHIL # BLD AUTO: 0.14 10*3/MM3 (ref 0–0.4)
EOSINOPHIL NFR BLD AUTO: 1.6 % (ref 0.3–6.2)
ERYTHROCYTE [DISTWIDTH] IN BLOOD BY AUTOMATED COUNT: 16.3 % (ref 12.3–15.4)
GEN 5 1HR TROPONIN T REFLEX: 19 NG/L
GLOBULIN UR ELPH-MCNC: 2 GM/DL
GLUCOSE SERPL-MCNC: 97 MG/DL (ref 65–99)
HCT VFR BLD AUTO: 21.2 % (ref 34–46.6)
HGB BLD-MCNC: 6.5 G/DL (ref 12–15.9)
HOLD SPECIMEN: NORMAL
HOLD SPECIMEN: NORMAL
IMM GRANULOCYTES # BLD AUTO: 0.08 10*3/MM3 (ref 0–0.05)
IMM GRANULOCYTES NFR BLD AUTO: 0.9 % (ref 0–0.5)
LYMPHOCYTES # BLD AUTO: 1.05 10*3/MM3 (ref 0.7–3.1)
LYMPHOCYTES NFR BLD AUTO: 11.7 % (ref 19.6–45.3)
MCH RBC QN AUTO: 29.3 PG (ref 26.6–33)
MCHC RBC AUTO-ENTMCNC: 30.7 G/DL (ref 31.5–35.7)
MCV RBC AUTO: 95.5 FL (ref 79–97)
MONOCYTES # BLD AUTO: 0.94 10*3/MM3 (ref 0.1–0.9)
MONOCYTES NFR BLD AUTO: 10.5 % (ref 5–12)
NEUTROPHILS NFR BLD AUTO: 6.74 10*3/MM3 (ref 1.7–7)
NEUTROPHILS NFR BLD AUTO: 74.9 % (ref 42.7–76)
NRBC BLD AUTO-RTO: 0.2 /100 WBC (ref 0–0.2)
NT-PROBNP SERPL-MCNC: 924.8 PG/ML (ref 0–1800)
PLATELET # BLD AUTO: 356 10*3/MM3 (ref 140–450)
PMV BLD AUTO: 10.6 FL (ref 6–12)
POTASSIUM SERPL-SCNC: 3.3 MMOL/L (ref 3.5–5.2)
PROT SERPL-MCNC: 6.2 G/DL (ref 6–8.5)
RBC # BLD AUTO: 2.22 10*6/MM3 (ref 3.77–5.28)
RH BLD: POSITIVE
RH BLD: POSITIVE
SODIUM SERPL-SCNC: 139 MMOL/L (ref 136–145)
T&S EXPIRATION DATE: NORMAL
TROPONIN T % DELTA: 6
TROPONIN T NUMERIC DELTA: 1 NG/L
TROPONIN T SERPL HS-MCNC: 18 NG/L
WBC NRBC COR # BLD AUTO: 8.99 10*3/MM3 (ref 3.4–10.8)
WHOLE BLOOD HOLD COAG: NORMAL
WHOLE BLOOD HOLD SPECIMEN: NORMAL

## 2025-05-13 PROCEDURE — 85025 COMPLETE CBC W/AUTO DIFF WBC: CPT | Performed by: EMERGENCY MEDICINE

## 2025-05-13 PROCEDURE — 99291 CRITICAL CARE FIRST HOUR: CPT

## 2025-05-13 PROCEDURE — 86850 RBC ANTIBODY SCREEN: CPT | Performed by: EMERGENCY MEDICINE

## 2025-05-13 PROCEDURE — 71045 X-RAY EXAM CHEST 1 VIEW: CPT

## 2025-05-13 PROCEDURE — 83880 ASSAY OF NATRIURETIC PEPTIDE: CPT | Performed by: EMERGENCY MEDICINE

## 2025-05-13 PROCEDURE — 86900 BLOOD TYPING SEROLOGIC ABO: CPT

## 2025-05-13 PROCEDURE — P9016 RBC LEUKOCYTES REDUCED: HCPCS

## 2025-05-13 PROCEDURE — 93005 ELECTROCARDIOGRAM TRACING: CPT | Performed by: EMERGENCY MEDICINE

## 2025-05-13 PROCEDURE — 36430 TRANSFUSION BLD/BLD COMPNT: CPT

## 2025-05-13 PROCEDURE — 99222 1ST HOSP IP/OBS MODERATE 55: CPT | Performed by: INTERNAL MEDICINE

## 2025-05-13 PROCEDURE — 84484 ASSAY OF TROPONIN QUANT: CPT | Performed by: EMERGENCY MEDICINE

## 2025-05-13 PROCEDURE — 86923 COMPATIBILITY TEST ELECTRIC: CPT

## 2025-05-13 PROCEDURE — 36415 COLL VENOUS BLD VENIPUNCTURE: CPT

## 2025-05-13 PROCEDURE — 80053 COMPREHEN METABOLIC PANEL: CPT | Performed by: EMERGENCY MEDICINE

## 2025-05-13 PROCEDURE — 93010 ELECTROCARDIOGRAM REPORT: CPT | Performed by: INTERNAL MEDICINE

## 2025-05-13 PROCEDURE — 93005 ELECTROCARDIOGRAM TRACING: CPT

## 2025-05-13 PROCEDURE — 86900 BLOOD TYPING SEROLOGIC ABO: CPT | Performed by: EMERGENCY MEDICINE

## 2025-05-13 PROCEDURE — 86901 BLOOD TYPING SEROLOGIC RH(D): CPT

## 2025-05-13 PROCEDURE — 86901 BLOOD TYPING SEROLOGIC RH(D): CPT | Performed by: EMERGENCY MEDICINE

## 2025-05-13 RX ORDER — SODIUM CHLORIDE 9 MG/ML
40 INJECTION, SOLUTION INTRAVENOUS AS NEEDED
Status: DISCONTINUED | OUTPATIENT
Start: 2025-05-13 | End: 2025-05-14 | Stop reason: HOSPADM

## 2025-05-13 RX ORDER — SODIUM CHLORIDE 0.9 % (FLUSH) 0.9 %
10 SYRINGE (ML) INJECTION AS NEEDED
Status: DISCONTINUED | OUTPATIENT
Start: 2025-05-13 | End: 2025-05-14 | Stop reason: HOSPADM

## 2025-05-13 RX ORDER — NALOXONE HCL 0.4 MG/ML
0.4 VIAL (ML) INJECTION
Status: DISCONTINUED | OUTPATIENT
Start: 2025-05-13 | End: 2025-05-14 | Stop reason: HOSPADM

## 2025-05-13 RX ORDER — FLUTICASONE FUROATE AND VILANTEROL TRIFENATATE 200; 25 UG/1; UG/1
1 POWDER RESPIRATORY (INHALATION) DAILY
COMMUNITY
Start: 2025-04-18

## 2025-05-13 RX ORDER — PANTOPRAZOLE SODIUM 40 MG/10ML
80 INJECTION, POWDER, LYOPHILIZED, FOR SOLUTION INTRAVENOUS ONCE
Status: COMPLETED | OUTPATIENT
Start: 2025-05-13 | End: 2025-05-13

## 2025-05-13 RX ORDER — ALUMINA, MAGNESIA, AND SIMETHICONE 2400; 2400; 240 MG/30ML; MG/30ML; MG/30ML
15 SUSPENSION ORAL EVERY 6 HOURS PRN
Status: DISCONTINUED | OUTPATIENT
Start: 2025-05-13 | End: 2025-05-14 | Stop reason: HOSPADM

## 2025-05-13 RX ORDER — ONDANSETRON 2 MG/ML
4 INJECTION INTRAMUSCULAR; INTRAVENOUS EVERY 4 HOURS PRN
Status: DISCONTINUED | OUTPATIENT
Start: 2025-05-13 | End: 2025-05-14 | Stop reason: HOSPADM

## 2025-05-13 RX ORDER — MORPHINE SULFATE 2 MG/ML
2 INJECTION, SOLUTION INTRAMUSCULAR; INTRAVENOUS EVERY 4 HOURS PRN
Status: DISCONTINUED | OUTPATIENT
Start: 2025-05-13 | End: 2025-05-14 | Stop reason: HOSPADM

## 2025-05-13 RX ORDER — ACETAMINOPHEN 650 MG/1
650 SUPPOSITORY RECTAL EVERY 4 HOURS PRN
Status: DISCONTINUED | OUTPATIENT
Start: 2025-05-13 | End: 2025-05-14 | Stop reason: HOSPADM

## 2025-05-13 RX ORDER — METOPROLOL SUCCINATE 25 MG/1
12.5 TABLET, EXTENDED RELEASE ORAL DAILY
Status: DISCONTINUED | OUTPATIENT
Start: 2025-05-14 | End: 2025-05-14 | Stop reason: HOSPADM

## 2025-05-13 RX ORDER — ALPRAZOLAM 0.25 MG
0.25 TABLET ORAL EVERY 6 HOURS PRN
Status: DISCONTINUED | OUTPATIENT
Start: 2025-05-13 | End: 2025-05-14 | Stop reason: HOSPADM

## 2025-05-13 RX ORDER — ACETAMINOPHEN 325 MG/1
650 TABLET ORAL EVERY 4 HOURS PRN
Status: DISCONTINUED | OUTPATIENT
Start: 2025-05-13 | End: 2025-05-14 | Stop reason: HOSPADM

## 2025-05-13 RX ORDER — POTASSIUM CHLORIDE 1500 MG/1
20 TABLET, EXTENDED RELEASE ORAL ONCE
Status: COMPLETED | OUTPATIENT
Start: 2025-05-13 | End: 2025-05-13

## 2025-05-13 RX ORDER — SODIUM CHLORIDE 0.9 % (FLUSH) 0.9 %
10 SYRINGE (ML) INJECTION EVERY 12 HOURS SCHEDULED
Status: DISCONTINUED | OUTPATIENT
Start: 2025-05-13 | End: 2025-05-14 | Stop reason: HOSPADM

## 2025-05-13 RX ORDER — PANTOPRAZOLE SODIUM 40 MG/10ML
40 INJECTION, POWDER, LYOPHILIZED, FOR SOLUTION INTRAVENOUS ONCE
Status: DISCONTINUED | OUTPATIENT
Start: 2025-05-13 | End: 2025-05-13 | Stop reason: SDUPTHER

## 2025-05-13 RX ORDER — ACETAMINOPHEN 160 MG/5ML
650 SOLUTION ORAL EVERY 4 HOURS PRN
Status: DISCONTINUED | OUTPATIENT
Start: 2025-05-13 | End: 2025-05-14 | Stop reason: HOSPADM

## 2025-05-13 RX ORDER — TEMAZEPAM 15 MG/1
15 CAPSULE ORAL NIGHTLY PRN
Status: DISCONTINUED | OUTPATIENT
Start: 2025-05-13 | End: 2025-05-14 | Stop reason: HOSPADM

## 2025-05-13 RX ORDER — ROSUVASTATIN CALCIUM 20 MG/1
20 TABLET, COATED ORAL NIGHTLY
Status: DISCONTINUED | OUTPATIENT
Start: 2025-05-13 | End: 2025-05-14 | Stop reason: HOSPADM

## 2025-05-13 RX ADMIN — SODIUM CHLORIDE 8 MG/HR: 9 INJECTION, SOLUTION INTRAVENOUS at 19:06

## 2025-05-13 RX ADMIN — Medication 10 ML: at 19:40

## 2025-05-13 RX ADMIN — SODIUM CHLORIDE 8 MG/HR: 9 INJECTION, SOLUTION INTRAVENOUS at 19:40

## 2025-05-13 RX ADMIN — TEMAZEPAM 15 MG: 15 CAPSULE ORAL at 21:05

## 2025-05-13 RX ADMIN — POTASSIUM CHLORIDE 20 MEQ: 1500 TABLET, EXTENDED RELEASE ORAL at 19:39

## 2025-05-13 RX ADMIN — SODIUM CHLORIDE 8 MG/HR: 9 INJECTION, SOLUTION INTRAVENOUS at 23:27

## 2025-05-13 RX ADMIN — ROSUVASTATIN CALCIUM 20 MG: 20 TABLET, FILM COATED ORAL at 19:39

## 2025-05-13 RX ADMIN — PANTOPRAZOLE SODIUM 80 MG: 40 INJECTION, POWDER, FOR SOLUTION INTRAVENOUS at 15:18

## 2025-05-13 NOTE — ED PROVIDER NOTES
Time: 2:05 PM EDT  Date of encounter:  5/13/2025  Independent Historian/Clinical History and Information was obtained by:   Patient and Family    History is limited by: N/A    Chief Complaint: Shortness of breath      History of Present Illness:  Patient is a 80 y.o. year old female who presents to the emergency department for evaluation of shortness of breath.  Patient presents complaining of shortness of breath worsening over the past week.  She also reports black stool.      Patient Care Team  Primary Care Provider: Carmen Ayon MD    Past Medical History:     Allergies   Allergen Reactions    Iodine Anaphylaxis    Lisinopril Swelling     Past Medical History:   Diagnosis Date    Arthritis     COPD (chronic obstructive pulmonary disease)     Emphysema lung     Hypertension     Osteoporosis      Past Surgical History:   Procedure Laterality Date    CARDIAC CATHETERIZATION N/A 4/8/2025    Procedure: Left Heart Cath;  Surgeon: Rinku Azevedo MD;  Location: [facility] CATH INVASIVE LOCATION;  Service: Cardiovascular;  Laterality: N/A;    KNEE ARTHROPLASTY Left     ORTHOPEDIC SURGERY      knee     Family History   Family history unknown: Yes       Home Medications:  Prior to Admission medications    Medication Sig Start Date End Date Taking? Authorizing Provider   amLODIPine (NORVASC) 5 MG tablet Take 1 tablet by mouth Daily. 2/20/25   Dav Huizar MD   aspirin 81 MG EC tablet Take 1 tablet by mouth Daily. 4/10/25   Rinku Azevedo MD   Cholecalciferol 25 MCG (1000 UT) tablet Take 1 tablet by mouth Daily.    Dav Huizar MD   docusate sodium (COLACE) 100 MG capsule Take 1 capsule by mouth Daily.    Dav Huizar MD   ibandronate (BONIVA) 150 MG tablet Take 1 tablet by mouth Every 30 (Thirty) Days. 2/20/25   Dav Huizar MD   metoprolol succinate XL (TOPROL-XL) 25 MG 24 hr tablet Take 0.5 tablets by mouth Daily. 4/10/25   Rinku Azeevdo MD   nicotine (NICODERM CQ) 21 MG/24HR  patch Place 1 patch on the skin as directed by provider Daily. 4/10/25   Rinku Azevedo MD   nitroglycerin (NITROSTAT) 0.4 MG SL tablet Place 1 tablet under the tongue Every 5 (Five) Minutes As Needed for Chest Pain (Systolic BP Greater Than 100). Take no more than 3 doses in 15 minutes. 4/10/25   Rinku Azevedo MD   predniSONE (DELTASONE) 50 MG tablet Take 1 tablet by mouth Take As Directed for 3 doses. Take 1 tablet (50mg) 13 Hours 7 Hours & 1 Hour Prior to Exam 5/1/25   Joleen Monk APRN   ProAir RespiClick 108 (90 Base) MCG/ACT inhaler Inhale 2 puffs Every 6 (Six) Hours As Needed for Wheezing or Shortness of Air.    Provider, MD Dav   rosuvastatin (CRESTOR) 20 MG tablet Take 1 tablet by mouth Every Night. 4/10/25   Rinku Azevedo MD   ticagrelor (BRILINTA) 90 MG tablet tablet Take 1 tablet by mouth 2 (Two) Times a Day. 4/10/25   Rinku Azevedo MD   Trelegy Ellipta 100-62.5-25 MCG/INH inhaler Inhale 1 puff Daily. 5/16/22   Emergency, Nurse Epic, RN        Social History:   Social History     Tobacco Use    Smoking status: Former     Current packs/day: 1.00     Average packs/day: 1 pack/day for 25.0 years (25.0 ttl pk-yrs)     Types: Cigarettes    Smokeless tobacco: Never   Vaping Use    Vaping status: Never Used   Substance Use Topics    Alcohol use: Yes     Alcohol/week: 1.0 standard drink of alcohol     Types: 1 Glasses of wine per week     Comment: ocassionally    Drug use: Never         Review of Systems:  Review of Systems   Constitutional:  Negative for chills and fever.   HENT:  Negative for congestion, rhinorrhea and sore throat.    Eyes:  Negative for pain and visual disturbance.   Respiratory:  Positive for shortness of breath. Negative for apnea, cough and chest tightness.    Cardiovascular:  Negative for chest pain and palpitations.   Gastrointestinal:  Positive for blood in stool. Negative for abdominal pain, diarrhea, nausea and vomiting.   Genitourinary:  Negative for difficulty  "urinating and dysuria.   Musculoskeletal:  Negative for joint swelling and myalgias.   Skin:  Negative for color change.   Neurological:  Negative for seizures and headaches.   Psychiatric/Behavioral: Negative.     All other systems reviewed and are negative.       Physical Exam:  BP (!) 122/102   Pulse 85   Temp 97.7 °F (36.5 °C) (Oral)   Resp 16   Ht 157.5 cm (62\")   Wt 40.6 kg (89 lb 8.1 oz)   SpO2 100%   BMI 16.37 kg/m²     Physical Exam  Vitals and nursing note reviewed.   Constitutional:       General: She is not in acute distress.     Appearance: Normal appearance. She is not toxic-appearing.   HENT:      Head: Normocephalic and atraumatic.      Jaw: There is normal jaw occlusion.   Eyes:      General: Lids are normal.      Extraocular Movements: Extraocular movements intact.      Conjunctiva/sclera: Conjunctivae normal.      Pupils: Pupils are equal, round, and reactive to light.   Cardiovascular:      Rate and Rhythm: Normal rate and regular rhythm.      Pulses: Normal pulses.      Heart sounds: Normal heart sounds.   Pulmonary:      Effort: Pulmonary effort is normal. No respiratory distress.      Breath sounds: Normal breath sounds. No wheezing or rhonchi.   Abdominal:      General: Abdomen is flat.      Palpations: Abdomen is soft.      Tenderness: There is no abdominal tenderness. There is no guarding or rebound.   Musculoskeletal:         General: Normal range of motion.      Cervical back: Normal range of motion and neck supple.      Right lower leg: No edema.      Left lower leg: No edema.   Skin:     General: Skin is warm and dry.      Coloration: Skin is pale.   Neurological:      Mental Status: She is alert and oriented to person, place, and time. Mental status is at baseline.   Psychiatric:         Mood and Affect: Mood normal.                    Medical Decision Making:      Comorbidities that affect care:    Coronary Artery Disease    External Notes reviewed:    Previous Clinic Note: " Cardiology office visit for CAD management      The following orders were placed and all results were independently analyzed by me:  Orders Placed This Encounter   Procedures    XR Chest 1 View    Nu Mine Draw    Comprehensive Metabolic Panel    BNP    High Sensitivity Troponin T    CBC Auto Differential    High Sensitivity Troponin T 1Hr    NPO Diet NPO Type: Strict NPO    Undress & Gown    Continuous Pulse Oximetry    Vital Signs    Verify Informed Consent    IP General Consult (Use specialty-specific consult if known)    Cardiology (on-call MD unless specified)    Oxygen Therapy- Nasal Cannula; Titrate 1-6 LPM Per SpO2; 90 - 95%    ECG 12 Lead ED Triage Standing Order; SOA    Prepare RBC, 2 Units    ABO RH Specimen Verification    Type & Screen    Insert Peripheral IV    CBC & Differential    Green Top (Gel)    Lavender Top    Gold Top - SST    Light Blue Top       Medications Given in the Emergency Department:  Medications   sodium chloride 0.9 % flush 10 mL (has no administration in time range)   pantoprazole (PROTONIX) injection 80 mg (80 mg Intravenous Given 5/13/25 1518)        ED Course:         Labs:    Lab Results (last 24 hours)       Procedure Component Value Units Date/Time    CBC & Differential [[id number]]  (Abnormal) Collected: 05/13/25 1204    Specimen: Blood Updated: 05/13/25 1233    Narrative:      The following orders were created for panel order CBC & Differential.  Procedure                               Abnormality         Status                     ---------                               -----------         ------                     CBC Auto Differential[[id number]]        Abnormal            Final result                 Please view results for these tests on the individual orders.    Comprehensive Metabolic Panel [[id number]]  (Abnormal) Collected: 05/13/25 1204    Specimen: Blood Updated: 05/13/25 1243     Glucose 97 mg/dL      BUN 10 mg/dL      Creatinine 0.89 mg/dL      Sodium 139 mmol/L       Potassium 3.3 mmol/L      Chloride 107 mmol/L      CO2 19.6 mmol/L      Calcium 9.0 mg/dL      Total Protein 6.2 g/dL      Albumin 4.2 g/dL      ALT (SGPT) 13 U/L      AST (SGOT) 23 U/L      Alkaline Phosphatase 109 U/L      Total Bilirubin 0.5 mg/dL      Globulin 2.0 gm/dL      A/G Ratio 2.1 g/dL      BUN/Creatinine Ratio 11.2     Anion Gap 12.4 mmol/L      eGFR 65.2 mL/min/1.73     Narrative:      GFR Categories in Chronic Kidney Disease (CKD)              GFR Category          GFR (mL/min/1.73)    Interpretation  G1                    90 or greater        Normal or high (1)  G2                    60-89                Mild decrease (1)  G3a                   45-59                Mild to moderate decrease  G3b                   30-44                Moderate to severe decrease  G4                    15-29                Severe decrease  G5                    14 or less           Kidney failure    (1)In the absence of evidence of kidney disease, neither GFR category G1 or G2 fulfill the criteria for CKD.    eGFR calculation 2021 CKD-EPI creatinine equation, which does not include race as a factor    BNP [[id number]]  (Normal) Collected: 05/13/25 1204    Specimen: Blood Updated: 05/13/25 1238     proBNP 924.8 pg/mL     Narrative:      This assay is used as an aid in the diagnosis of individuals suspected of having heart failure. It can be used as an aid in the diagnosis of acute decompensated heart failure (ADHF) in patients presenting with signs and symptoms of ADHF to the emergency department (ED). In addition, NT-proBNP of <300 pg/mL indicates ADHF is not likely.    Age Range Result Interpretation  NT-proBNP Concentration (pg/mL:      <50             Positive            >450                   Gray                 300-450                    Negative             <300    50-75           Positive            >900                  Gray                300-900                  Negative            <300      >75              Positive            >1800                  Gray                300-1800                  Negative            <300    High Sensitivity Troponin T [[id number]]  (Abnormal) Collected: 05/13/25 1204    Specimen: Blood Updated: 05/13/25 1243     HS Troponin T 18 ng/L     Narrative:      High Sensitive Troponin T Reference Range:  <14.0 ng/L- Negative Female for AMI  <22.0 ng/L- Negative Male for AMI  >=14 - Abnormal Female indicating possible myocardial injury.  >=22 - Abnormal Male indicating possible myocardial injury.   Clinicians would have to utilize clinical acumen, EKG, Troponin, and serial changes to determine if it is an Acute Myocardial Infarction or myocardial injury due to an underlying chronic condition.         CBC Auto Differential [[id number]]  (Abnormal) Collected: 05/13/25 1204    Specimen: Blood Updated: 05/13/25 1233     WBC 8.99 10*3/mm3      RBC 2.22 10*6/mm3      Hemoglobin 6.5 g/dL      Hematocrit 21.2 %      MCV 95.5 fL      MCH 29.3 pg      MCHC 30.7 g/dL      RDW 16.3 %      RDW-SD 56.6 fl      MPV 10.6 fL      Platelets 356 10*3/mm3      Neutrophil % 74.9 %      Lymphocyte % 11.7 %      Monocyte % 10.5 %      Eosinophil % 1.6 %      Basophil % 0.4 %      Immature Grans % 0.9 %      Neutrophils, Absolute 6.74 10*3/mm3      Lymphocytes, Absolute 1.05 10*3/mm3      Monocytes, Absolute 0.94 10*3/mm3      Eosinophils, Absolute 0.14 10*3/mm3      Basophils, Absolute 0.04 10*3/mm3      Immature Grans, Absolute 0.08 10*3/mm3      nRBC 0.2 /100 WBC     High Sensitivity Troponin T 1Hr [[id number]]  (Abnormal) Collected: 05/13/25 1345    Specimen: Blood Updated: 05/13/25 1412     HS Troponin T 19 ng/L      Troponin T Numeric Delta 1 ng/L      Troponin T % Delta 6    Narrative:      High Sensitive Troponin T Reference Range:  <14.0 ng/L- Negative Female for AMI  <22.0 ng/L- Negative Male for AMI  >=14 - Abnormal Female indicating possible myocardial injury.  >=22 - Abnormal Male indicating possible  myocardial injury.   Clinicians would have to utilize clinical acumen, EKG, Troponin, and serial changes to determine if it is an Acute Myocardial Infarction or myocardial injury due to an underlying chronic condition.                  Imaging:    XR Chest 1 View  Result Date: 5/13/2025  XR CHEST 1 VW Date of Exam: 5/13/2025 11:47 AM EDT Indication: SOA Triage Protocol Comparison: Chest AP dated 4/8/2025 Findings: Nodular densities projecting over the lower lung fields bilaterally are consistent with nipple shadows. Emphysematous changes are noted. The cardiac and mediastinal silhouettes appear normal.     Impression: Emphysematous changes. No acute infiltrate Electronically Signed: Anirudh Paez MD  5/13/2025 12:08 PM EDT  Workstation ID: [id number]        Differential Diagnosis and Discussion:    Dyspnea: Differential diagnosis includes but is not limited to metabolic acidosis, neurological disorders, psychogenic, asthma, pneumothorax, upper airway obstruction, COPD, pneumonia, noncardiogenic pulmonary edema, interstitial lung disease, anemia, congestive heart failure, and pulmonary embolism  GI Bleeding: Differential diagnosis includes but is not limited to gastritis, gastric ulcer, stress ulcer, duodenitis, Ana-Heredia tears, esophageal varices, angiodysplasia, aortic enteric fistula, hematologic issues including thrombocytopenia, GI neoplasm, ulcerative colitis, Crohn's disease, diverticulosis, diverticulitis, hemorrhoids, aortic aneurysm, and polyps    PROCEDURES:    Labs were collected in the emergency department and all labs were reviewed and interpreted by me.  X-ray were performed in the emergency department and all X-ray impressions were independently interpreted by me.  An EKG was performed and the EKG was interpreted by me.    ECG 12 Lead ED Triage Standing Order; SOA   Preliminary Result   HEART RATE=81  bpm   RR Yqltnayb=113  ms   GA Vbnsjhor=581  ms   P Horizontal Axis=  deg   P Front Axis=85  deg    QRSD Interval=79  ms   QT Ebyazjna=812  ms   PRvJ=869  ms   QRS Axis=40  deg   T Wave Axis=-8  deg   - BORDERLINE ECG -   Sinus rhythm   Probable left atrial enlargement   Borderline repol abnrm, inferolateral leads   Date and Time of Study:2025-05-13 11:38:05        My interpretation of the EKG shows normal sinus rhythm, normal rate, normal QT, no acute ischemia    Procedures    MDM  Number of Diagnoses or Management Options  Acute anemia  Acute GI bleeding  Diagnosis management comments: In summary this is an 80-year-old female patient who presents for evaluation of shortness of breath.  CBC independently reviewed interpreted by me is positive for critical abnormalities of anemia and blood transfusion has been initiated.  CMP independently reviewed and interpreted by me and shows no critical abnormalities.  Patient does admit to black stools and Protonix has been given as well.  Patient case has been discussed with the hospitalist team who will admit to the hospital for further evaluation and continuation of treatment.             Patient was placed on the cardiac monitor after being given blood transfusion.  They were monitored for ventricular ectopy, arrhythmia, tachycardia, hypoxia, and changes in blood pressure.  Patient was rechecked several times throughout their stay for mental status decline and for reassessment of worsening changes in vital signs.     Total Critical Care time of 40 minutes. Total critical care time documented does not include time spent on separately billed procedures for services of nurses or physician assistants. I personally saw and examined the patient. I have reviewed all diagnostic interpretations and treatment plans as written. I was present for the key portions of any procedures performed and the inclusive time noted in any critical care statement. Critical care time includes patient management by me, time spent at the patients bedside,  time to review lab and imaging results,  discussing patient care, documentation in the medical record, and time spent with family or caregiver.          Patient Care Considerations:    CT ABDOMEN AND PELVIS: I considered ordering a CT scan of the abdomen and pelvis however benign abdominal examination      Consultants/Shared Management Plan:    Consultant: I have discussed the case with Dr. Jose Guadalupe Guy who agrees to consult on the patient.  PCP: I have discussed the case with Dr. Corbin who agrees to accept the patient for admission.    Social Determinants of Health:    Patient is independent, reliable, and has access to care.       Disposition and Care Coordination:    Admit:   Through independent evaluation of the patient's history, physical, and imperical data, the patient meets criteria for inpatient admission to the hospital.        Final diagnoses:   Acute anemia   Acute GI bleeding        ED Disposition       ED Disposition   Decision to Admit    Condition   --    Comment   --               This medical record created using voice recognition software.             Gage Mojica MD  05/13/25 1529

## 2025-05-13 NOTE — TELEPHONE ENCOUNTER
I spoke to patient/patient's granddaughter and gave an appointment on 05/23/25 for [facility]. Patient was instructed to have a  for the day of the procedure and to arrive at the main entrance registration area in the [facility]. Patient was educated about stent placement and informed that in the event of stent placement, the patient will be required to stay overnight for observation. Patient was instructed to continue all medications as usual. Patient was instructed to take her Prednisone before the procedure as directed. Patient was instructed to have no solid food or milk for 6 hours prior to scheduled arrival time, clear liquids only for 6 hours prior up to 2 hours prior to scheduled arrival time, NPO for 2 hours prior to scheduled arrival time. Patient was instructed to have labs completed on the morning of the procedure. Patient was advised surgery scheduling department will call the afternoon before the procedure to provide an arrival time. Granddaughter advised patient is having a hard time breathing.  Advised her patient needs to go to the ER.  Granddaughter reports patient will not go to the ER.  Transferred call to [address].

## 2025-05-13 NOTE — TELEPHONE ENCOUNTER
SW patient. Due to the nature of the symptoms it is advised patient go to ER for evaluation> IF the symptoms are from COPD they will be able to treat, if the symptoms are from blood flow in the heart they can evaluate and notify Dr Jose Guadalupe Guy.     Patient verbalized understanding and appreciation> Asked I call her grand daughter to inform her.

## 2025-05-13 NOTE — H&P
[facility]   HISTORY AND PHYSICAL    Patient Name: Yanni Vega  : 1945  MRN: [id number]  Primary Care Physician:  Carmen Ayon MD  Date of admission: 2025    Subjective   Subjective     Chief Complaint:   Shortness of breath and fatigue      HPI:    Yanni Vega is a 80 y.o. female with known history of coronary artery disease and recent cardiac stent placement came to ER with increasing shortness of breath of several days duration.  Patient getting fatigued and short of breath no chest pain.  Workup in the ER revealed severe anemia with hemoglobin of 6.5 and black-colored stools.  Patient being admitted for further workup        Review of Systems:      Denies any chest pain.  Complains of shortness of breath.  Fatigue and weakness.  No nausea vomiting or diarrhea.  Black-colored stools.  No abdominal pain    Personal History     Past Medical History:   Diagnosis Date    Arthritis     COPD (chronic obstructive pulmonary disease)     Emphysema lung     Hypertension     Osteoporosis        Past Surgical History:   Procedure Laterality Date    CARDIAC CATHETERIZATION N/A 2025    Procedure: Left Heart Cath;  Surgeon: Rinku Azevedo MD;  Location: [facility] CATH INVASIVE LOCATION;  Service: Cardiovascular;  Laterality: N/A;    KNEE ARTHROPLASTY Left     ORTHOPEDIC SURGERY      knee       Family History: Family history is unknown by patient. Otherwise pertinent FHx was reviewed and not pertinent to current issue.    Social History:  reports that she has quit smoking. Her smoking use included cigarettes. She has a 25 pack-year smoking history. She has never used smokeless tobacco. She reports current alcohol use of about 1.0 standard drink of alcohol per week. She reports that she does not use drugs.    Home Medications:  Fluticasone-Umeclidin-Vilant, albuterol, amLODIPine, aspirin, cholecalciferol, docusate sodium, ibandronate, metoprolol succinate XL, nicotine, nitroglycerin, predniSONE,  rosuvastatin, and ticagrelor      Allergies:  Allergies   Allergen Reactions    Iodine Anaphylaxis    Lisinopril Swelling       Objective   Objective     Vitals:   Temp:  [97.7 °F (36.5 °C)] 97.7 °F (36.5 °C)  Heart Rate:  [85-94] 85  Resp:  [16-20] 16  BP: (122)/(102) 122/102    Physical Exam    Elderly female not in acute distress looks of her stated age, pale.  Heart regular grade 2 murmur.  Lungs diminished breath sounds.  Abdomen soft mild epigastric tenderness no guarding or rebound.  Extremities no edema.  Neuro awake alert and oriented      I have personally reviewed the results from the time of this admission to 5/13/2025 15:43 EDT and agree with these findings:  [x]  Laboratory  []  Microbiology  [x]  Radiology  []  EKG/Telemetry   []  Cardiology/Vascular   []  Pathology  [x]  Old records  []  Other:    CBC:    WBC   Date Value Ref Range Status   05/13/2025 8.99 3.40 - 10.80 10*3/mm3 Final     RBC   Date Value Ref Range Status   05/13/2025 2.22 (L) 3.77 - 5.28 10*6/mm3 Final     Hemoglobin   Date Value Ref Range Status   05/13/2025 6.5 (C) 12.0 - 15.9 g/dL Final     Hematocrit   Date Value Ref Range Status   05/13/2025 21.2 (L) 34.0 - 46.6 % Final     MCV   Date Value Ref Range Status   05/13/2025 95.5 79.0 - 97.0 fL Final     MCH   Date Value Ref Range Status   05/13/2025 29.3 26.6 - 33.0 pg Final     MCHC   Date Value Ref Range Status   05/13/2025 30.7 (L) 31.5 - 35.7 g/dL Final     RDW   Date Value Ref Range Status   05/13/2025 16.3 (H) 12.3 - 15.4 % Final     RDW-SD   Date Value Ref Range Status   05/13/2025 56.6 (H) 37.0 - 54.0 fl Final     MPV   Date Value Ref Range Status   05/13/2025 10.6 6.0 - 12.0 fL Final     Platelets   Date Value Ref Range Status   05/13/2025 356 140 - 450 10*3/mm3 Final     Neutrophil %   Date Value Ref Range Status   05/13/2025 74.9 42.7 - 76.0 % Final     Lymphocyte %   Date Value Ref Range Status   05/13/2025 11.7 (L) 19.6 - 45.3 % Final     Monocyte %   Date Value Ref  Range Status   05/13/2025 10.5 5.0 - 12.0 % Final     Eosinophil %   Date Value Ref Range Status   05/13/2025 1.6 0.3 - 6.2 % Final     Basophil %   Date Value Ref Range Status   05/13/2025 0.4 0.0 - 1.5 % Final     Immature Grans %   Date Value Ref Range Status   05/13/2025 0.9 (H) 0.0 - 0.5 % Final     Neutrophils, Absolute   Date Value Ref Range Status   05/13/2025 6.74 1.70 - 7.00 10*3/mm3 Final     Lymphocytes, Absolute   Date Value Ref Range Status   05/13/2025 1.05 0.70 - 3.10 10*3/mm3 Final     Monocytes, Absolute   Date Value Ref Range Status   05/13/2025 0.94 (H) 0.10 - 0.90 10*3/mm3 Final     Eosinophils, Absolute   Date Value Ref Range Status   05/13/2025 0.14 0.00 - 0.40 10*3/mm3 Final     Basophils, Absolute   Date Value Ref Range Status   05/13/2025 0.04 0.00 - 0.20 10*3/mm3 Final     Immature Grans, Absolute   Date Value Ref Range Status   05/13/2025 0.08 (H) 0.00 - 0.05 10*3/mm3 Final     nRBC   Date Value Ref Range Status   05/13/2025 0.2 0.0 - 0.2 /100 WBC Final        BMP:    Lab Results   Component Value Date    GLUCOSE 97 05/13/2025    BUN 10 05/13/2025    CREATININE 0.89 05/13/2025    BCR 11.2 05/13/2025    K 3.3 (L) 05/13/2025    CO2 19.6 (L) 05/13/2025    CALCIUM 9.0 05/13/2025    ALBUMIN 4.2 05/13/2025    AST 23 05/13/2025    ALT 13 05/13/2025        XR Chest 1 View  Result Date: 5/13/2025  Impression: Emphysematous changes. No acute infiltrate Electronically Signed: Anirudh Paez MD  5/13/2025 12:08 PM EDT  Workstation ID: [id number]             Assessment & Plan   Assessment / Plan       Current Diagnosis:  Active Hospital Problems    Diagnosis     **Acute GI bleeding     Severe anemia     Shortness of breath     Coronary artery disease      Plan:   Shortness of breath and fatigue related to anemia.  Admit to hospital  Hold Brilinta  Consult cardiology for opinion  GI workup  Add PPI  Monitor H&H and transfuse for hemoglobin below 7  Further management based on clinical course      VTE  Prophylaxis:  Mechanical VTE prophylaxis orders are signed & held.          GI Prophylaxis:       Protonix    CODE STATUS:    Code Status (Patient has no pulse and is not breathing): CPR (Attempt to Resuscitate)  Medical Interventions (Patient has pulse or is breathing): Full Support    Admission Status:  I believe this patient meets inpatient status.             I have dictated this note utilizing Dragon Dictation.             Please note that portions of this note were completed with a voice recognition program.             Part of this note may be an electronic transcription/translation of spoken language to printed text         using the Dragon Dictation System.       Electronically signed by Warren Corbin MD, 05/13/25, 3:41 PM EDT.    Total time spent with in evaluation and management:

## 2025-05-13 NOTE — TELEPHONE ENCOUNTER
Patient grand daughter called stating patient is having issues with SOA, weakness- stated it started about 3 weeks ago, patient now is unable to complete ADL'S.    Patient lives alone. SW patient.   Patient states she is sitting on the bed with back against the pillows and she is fine, but once she tries to get up she experiences severe SOA with any activity. Patient has been trying to use her inhalers more to assist but this has not worked.    Patient states she felt great right after her heart attack when she got home. She has steadily declined to now she is unable to get out  of bed without having Severe SOA- Weakness. Patient does not have BP device at home to check BP- . Patient BP was on the low side at the last office visit.     Patient is scheduled for staged PCI on 05/23 with Dr Jose Guadalupe Guy.     Please advise

## 2025-05-14 ENCOUNTER — TELEPHONE (OUTPATIENT)
Dept: CARDIAC SURGERY | Facility: CLINIC | Age: 80
End: 2025-05-14
Payer: MEDICARE

## 2025-05-14 ENCOUNTER — HOSPITAL ENCOUNTER (INPATIENT)
Facility: HOSPITAL | Age: 80
LOS: 4 days | Discharge: HOME | DRG: 378 | End: 2025-05-18
Attending: THORACIC SURGERY (CARDIOTHORACIC VASCULAR SURGERY) | Admitting: THORACIC SURGERY (CARDIOTHORACIC VASCULAR SURGERY)
Payer: MEDICARE

## 2025-05-14 VITALS
RESPIRATION RATE: 18 BRPM | SYSTOLIC BLOOD PRESSURE: 124 MMHG | OXYGEN SATURATION: 98 % | TEMPERATURE: 97.9 F | BODY MASS INDEX: 16.84 KG/M2 | DIASTOLIC BLOOD PRESSURE: 65 MMHG | HEIGHT: 62 IN | WEIGHT: 91.49 LBS | HEART RATE: 80 BPM

## 2025-05-14 DIAGNOSIS — I21.11 ST ELEVATION MYOCARDIAL INFARCTION INVOLVING RIGHT CORONARY ARTERY: Primary | ICD-10-CM

## 2025-05-14 DIAGNOSIS — K92.1 GASTROINTESTINAL HEMORRHAGE WITH MELENA: ICD-10-CM

## 2025-05-14 PROBLEM — K92.2 GI BLEED: Status: ACTIVE | Noted: 2025-05-14

## 2025-05-14 PROBLEM — K92.2 ACUTE GI BLEEDING: Status: RESOLVED | Noted: 2025-05-13 | Resolved: 2025-05-14

## 2025-05-14 PROBLEM — D58.2 SIGNIFICANT DROP IN HEMOGLOBIN: Status: RESOLVED | Noted: 2025-05-13 | Resolved: 2025-05-14

## 2025-05-14 LAB
ALBUMIN SERPL-MCNC: 3.4 G/DL (ref 3.5–5.2)
ALBUMIN/GLOB SERPL: 1.7 G/DL
ALP SERPL-CCNC: 91 U/L (ref 39–117)
ALT SERPL W P-5'-P-CCNC: 11 U/L (ref 1–33)
ANION GAP SERPL CALCULATED.3IONS-SCNC: 8.2 MMOL/L (ref 5–15)
ANION GAP SERPL CALCULATED.3IONS-SCNC: 9 MMOL/L (ref 5–15)
AST SERPL-CCNC: 19 U/L (ref 1–32)
BASOPHILS # BLD AUTO: 0.03 10*3/MM3 (ref 0–0.2)
BASOPHILS # BLD AUTO: 0.04 10*3/MM3 (ref 0–0.2)
BASOPHILS NFR BLD AUTO: 0.5 % (ref 0–1.5)
BASOPHILS NFR BLD AUTO: 0.6 % (ref 0–1.5)
BILIRUB SERPL-MCNC: 0.6 MG/DL (ref 0–1.2)
BUN SERPL-MCNC: 8 MG/DL (ref 8–23)
BUN SERPL-MCNC: 9 MG/DL (ref 8–23)
BUN/CREAT SERPL: 10.2 (ref 7–25)
BUN/CREAT SERPL: 7.8 (ref 7–25)
CALCIUM SPEC-SCNC: 7.9 MG/DL (ref 8.6–10.5)
CALCIUM SPEC-SCNC: 8.5 MG/DL (ref 8.6–10.5)
CHLORIDE SERPL-SCNC: 112 MMOL/L (ref 98–107)
CHLORIDE SERPL-SCNC: 112 MMOL/L (ref 98–107)
CHOLEST SERPL-MCNC: 101 MG/DL (ref 0–200)
CO2 SERPL-SCNC: 19 MMOL/L (ref 22–29)
CO2 SERPL-SCNC: 19.8 MMOL/L (ref 22–29)
CREAT SERPL-MCNC: 0.88 MG/DL (ref 0.57–1)
CREAT SERPL-MCNC: 1.03 MG/DL (ref 0.57–1)
DEPRECATED RDW RBC AUTO: 50.7 FL (ref 37–54)
DEPRECATED RDW RBC AUTO: 51.6 FL (ref 37–54)
EGFRCR SERPLBLD CKD-EPI 2021: 54.7 ML/MIN/1.73
EGFRCR SERPLBLD CKD-EPI 2021: 66.1 ML/MIN/1.73
EOSINOPHIL # BLD AUTO: 0.17 10*3/MM3 (ref 0–0.4)
EOSINOPHIL # BLD AUTO: 0.17 10*3/MM3 (ref 0–0.4)
EOSINOPHIL NFR BLD AUTO: 2.5 % (ref 0.3–6.2)
EOSINOPHIL NFR BLD AUTO: 2.6 % (ref 0.3–6.2)
ERYTHROCYTE [DISTWIDTH] IN BLOOD BY AUTOMATED COUNT: 14.6 % (ref 12.3–15.4)
ERYTHROCYTE [DISTWIDTH] IN BLOOD BY AUTOMATED COUNT: 15.3 % (ref 12.3–15.4)
GLOBULIN UR ELPH-MCNC: 2 GM/DL
GLUCOSE SERPL-MCNC: 81 MG/DL (ref 65–99)
GLUCOSE SERPL-MCNC: 91 MG/DL (ref 65–99)
HCT VFR BLD AUTO: 27.8 % (ref 34–46.6)
HCT VFR BLD AUTO: 28.9 % (ref 34–46.6)
HCT VFR BLD AUTO: 29.1 % (ref 34–46.6)
HCT VFR BLD AUTO: 30.2 % (ref 34–46.6)
HCT VFR BLD AUTO: 31.2 % (ref 34–46.6)
HDLC SERPL-MCNC: 57 MG/DL (ref 40–60)
HGB BLD-MCNC: 10.2 G/DL (ref 12–15.9)
HGB BLD-MCNC: 9.1 G/DL (ref 12–15.9)
HGB BLD-MCNC: 9.4 G/DL (ref 12–15.9)
HGB BLD-MCNC: 9.6 G/DL (ref 12–15.9)
HGB BLD-MCNC: 9.9 G/DL (ref 12–15.9)
IMM GRANULOCYTES # BLD AUTO: 0.01 10*3/MM3 (ref 0–0.05)
IMM GRANULOCYTES # BLD AUTO: 0.02 10*3/MM3 (ref 0–0.05)
IMM GRANULOCYTES NFR BLD AUTO: 0.2 % (ref 0–0.5)
IMM GRANULOCYTES NFR BLD AUTO: 0.3 % (ref 0–0.5)
LDLC SERPL CALC-MCNC: 31 MG/DL (ref 0–100)
LDLC/HDLC SERPL: 0.58 {RATIO}
LYMPHOCYTES # BLD AUTO: 0.99 10*3/MM3 (ref 0.7–3.1)
LYMPHOCYTES # BLD AUTO: 1.05 10*3/MM3 (ref 0.7–3.1)
LYMPHOCYTES NFR BLD AUTO: 15.2 % (ref 19.6–45.3)
LYMPHOCYTES NFR BLD AUTO: 15.6 % (ref 19.6–45.3)
MCH RBC QN AUTO: 30.1 PG (ref 26.6–33)
MCH RBC QN AUTO: 31 PG (ref 26.6–33)
MCHC RBC AUTO-ENTMCNC: 32.3 G/DL (ref 31.5–35.7)
MCHC RBC AUTO-ENTMCNC: 32.7 G/DL (ref 31.5–35.7)
MCV RBC AUTO: 93.3 FL (ref 79–97)
MCV RBC AUTO: 94.8 FL (ref 79–97)
MONOCYTES # BLD AUTO: 0.79 10*3/MM3 (ref 0.1–0.9)
MONOCYTES # BLD AUTO: 1.05 10*3/MM3 (ref 0.1–0.9)
MONOCYTES NFR BLD AUTO: 11.7 % (ref 5–12)
MONOCYTES NFR BLD AUTO: 16.2 % (ref 5–12)
NEUTROPHILS NFR BLD AUTO: 4.25 10*3/MM3 (ref 1.7–7)
NEUTROPHILS NFR BLD AUTO: 4.68 10*3/MM3 (ref 1.7–7)
NEUTROPHILS NFR BLD AUTO: 65.3 % (ref 42.7–76)
NEUTROPHILS NFR BLD AUTO: 69.3 % (ref 42.7–76)
NRBC BLD AUTO-RTO: 0 /100 WBC (ref 0–0.2)
NRBC BLD AUTO-RTO: 0 /100 WBC (ref 0–0.2)
PLATELET # BLD AUTO: 239 10*3/MM3 (ref 140–450)
PLATELET # BLD AUTO: 253 10*3/MM3 (ref 140–450)
PMV BLD AUTO: 10.8 FL (ref 6–12)
PMV BLD AUTO: 10.8 FL (ref 6–12)
POTASSIUM SERPL-SCNC: 3.1 MMOL/L (ref 3.5–5.2)
POTASSIUM SERPL-SCNC: 3.3 MMOL/L (ref 3.5–5.2)
POTASSIUM SERPL-SCNC: 3.9 MMOL/L (ref 3.5–5.2)
POTASSIUM SERPL-SCNC: 3.9 MMOL/L (ref 3.5–5.2)
PROT SERPL-MCNC: 5.4 G/DL (ref 6–8.5)
QT INTERVAL: 406 MS
QTC INTERVAL: 441 MS
RBC # BLD AUTO: 3.12 10*6/MM3 (ref 3.77–5.28)
RBC # BLD AUTO: 3.29 10*6/MM3 (ref 3.77–5.28)
SODIUM SERPL-SCNC: 140 MMOL/L (ref 136–145)
SODIUM SERPL-SCNC: 140 MMOL/L (ref 136–145)
TRIGL SERPL-MCNC: 55 MG/DL (ref 0–150)
TROPONIN T SERPL HS-MCNC: 17 NG/L
VLDLC SERPL-MCNC: 13 MG/DL (ref 5–40)
WBC NRBC COR # BLD AUTO: 6.5 10*3/MM3 (ref 3.4–10.8)
WBC NRBC COR # BLD AUTO: 6.75 10*3/MM3 (ref 3.4–10.8)

## 2025-05-14 PROCEDURE — 25010000002 POTASSIUM CHLORIDE 10 MEQ/100ML SOLUTION: Performed by: INTERNAL MEDICINE

## 2025-05-14 PROCEDURE — 93005 ELECTROCARDIOGRAM TRACING: CPT | Performed by: INTERNAL MEDICINE

## 2025-05-14 PROCEDURE — 80061 LIPID PANEL: CPT | Performed by: NURSE PRACTITIONER

## 2025-05-14 PROCEDURE — 94760 N-INVAS EAR/PLS OXIMETRY 1: CPT

## 2025-05-14 PROCEDURE — 80053 COMPREHEN METABOLIC PANEL: CPT | Performed by: INTERNAL MEDICINE

## 2025-05-14 PROCEDURE — 85014 HEMATOCRIT: CPT | Performed by: INTERNAL MEDICINE

## 2025-05-14 PROCEDURE — 99223 1ST HOSP IP/OBS HIGH 75: CPT | Performed by: INTERNAL MEDICINE

## 2025-05-14 PROCEDURE — 85018 HEMOGLOBIN: CPT | Performed by: INTERNAL MEDICINE

## 2025-05-14 PROCEDURE — 94640 AIRWAY INHALATION TREATMENT: CPT

## 2025-05-14 PROCEDURE — 94761 N-INVAS EAR/PLS OXIMETRY MLT: CPT

## 2025-05-14 PROCEDURE — 85025 COMPLETE CBC W/AUTO DIFF WBC: CPT | Performed by: NURSE PRACTITIONER

## 2025-05-14 PROCEDURE — 84132 ASSAY OF SERUM POTASSIUM: CPT | Performed by: INTERNAL MEDICINE

## 2025-05-14 PROCEDURE — 84484 ASSAY OF TROPONIN QUANT: CPT | Performed by: INTERNAL MEDICINE

## 2025-05-14 PROCEDURE — 94799 UNLISTED PULMONARY SVC/PX: CPT

## 2025-05-14 PROCEDURE — 85025 COMPLETE CBC W/AUTO DIFF WBC: CPT | Performed by: INTERNAL MEDICINE

## 2025-05-14 RX ORDER — ACETAMINOPHEN 325 MG/1
650 TABLET ORAL EVERY 4 HOURS PRN
Start: 2025-05-14 | End: 2025-05-18 | Stop reason: HOSPADM

## 2025-05-14 RX ORDER — BUDESONIDE 0.5 MG/2ML
0.5 INHALANT ORAL
Status: DISCONTINUED | OUTPATIENT
Start: 2025-05-14 | End: 2025-05-18 | Stop reason: HOSPADM

## 2025-05-14 RX ORDER — TEMAZEPAM 15 MG/1
15 CAPSULE ORAL NIGHTLY PRN
Start: 2025-05-14 | End: 2025-05-18 | Stop reason: HOSPADM

## 2025-05-14 RX ORDER — ACETAMINOPHEN 325 MG/1
650 TABLET ORAL EVERY 4 HOURS PRN
Status: DISCONTINUED | OUTPATIENT
Start: 2025-05-14 | End: 2025-05-18 | Stop reason: HOSPADM

## 2025-05-14 RX ORDER — SODIUM CHLORIDE 0.9 % (FLUSH) 0.9 %
10 SYRINGE (ML) INJECTION AS NEEDED
Status: DISCONTINUED | OUTPATIENT
Start: 2025-05-14 | End: 2025-05-18 | Stop reason: HOSPADM

## 2025-05-14 RX ORDER — ONDANSETRON 2 MG/ML
4 INJECTION INTRAMUSCULAR; INTRAVENOUS EVERY 4 HOURS PRN
Start: 2025-05-14 | End: 2025-05-18 | Stop reason: HOSPADM

## 2025-05-14 RX ORDER — POLYETHYLENE GLYCOL 3350 17 G/17G
17 POWDER, FOR SOLUTION ORAL DAILY PRN
Status: DISCONTINUED | OUTPATIENT
Start: 2025-05-14 | End: 2025-05-18 | Stop reason: HOSPADM

## 2025-05-14 RX ORDER — MORPHINE SULFATE 2 MG/ML
2 INJECTION, SOLUTION INTRAMUSCULAR; INTRAVENOUS EVERY 4 HOURS PRN
Start: 2025-05-14 | End: 2025-05-18 | Stop reason: HOSPADM

## 2025-05-14 RX ORDER — NICOTINE 21 MG/24HR
1 PATCH, TRANSDERMAL 24 HOURS TRANSDERMAL
Status: DISCONTINUED | OUTPATIENT
Start: 2025-05-15 | End: 2025-05-18 | Stop reason: HOSPADM

## 2025-05-14 RX ORDER — BISACODYL 5 MG/1
5 TABLET, DELAYED RELEASE ORAL DAILY PRN
Status: DISCONTINUED | OUTPATIENT
Start: 2025-05-14 | End: 2025-05-18 | Stop reason: HOSPADM

## 2025-05-14 RX ORDER — ARFORMOTEROL TARTRATE 15 UG/2ML
15 SOLUTION RESPIRATORY (INHALATION)
Status: DISCONTINUED | OUTPATIENT
Start: 2025-05-14 | End: 2025-05-18 | Stop reason: HOSPADM

## 2025-05-14 RX ORDER — SODIUM CHLORIDE 9 MG/ML
40 INJECTION, SOLUTION INTRAVENOUS AS NEEDED
Status: DISCONTINUED | OUTPATIENT
Start: 2025-05-14 | End: 2025-05-18 | Stop reason: HOSPADM

## 2025-05-14 RX ORDER — POTASSIUM CHLORIDE 7.45 MG/ML
10 INJECTION INTRAVENOUS
Status: COMPLETED | OUTPATIENT
Start: 2025-05-14 | End: 2025-05-14

## 2025-05-14 RX ORDER — MORPHINE SULFATE 2 MG/ML
2 INJECTION, SOLUTION INTRAMUSCULAR; INTRAVENOUS EVERY 4 HOURS PRN
Refills: 0 | Status: DISCONTINUED | OUTPATIENT
Start: 2025-05-14 | End: 2025-05-18 | Stop reason: HOSPADM

## 2025-05-14 RX ORDER — TEMAZEPAM 15 MG/1
15 CAPSULE ORAL NIGHTLY PRN
Status: DISCONTINUED | OUTPATIENT
Start: 2025-05-14 | End: 2025-05-18 | Stop reason: HOSPADM

## 2025-05-14 RX ORDER — ALPRAZOLAM 0.25 MG
0.25 TABLET ORAL EVERY 6 HOURS PRN
Start: 2025-05-14 | End: 2025-05-18 | Stop reason: HOSPADM

## 2025-05-14 RX ORDER — ROSUVASTATIN CALCIUM 20 MG/1
20 TABLET, COATED ORAL NIGHTLY
Status: DISCONTINUED | OUTPATIENT
Start: 2025-05-14 | End: 2025-05-18 | Stop reason: HOSPADM

## 2025-05-14 RX ORDER — PANTOPRAZOLE SODIUM 40 MG/10ML
40 INJECTION, POWDER, LYOPHILIZED, FOR SOLUTION INTRAVENOUS EVERY 12 HOURS SCHEDULED
Status: DISCONTINUED | OUTPATIENT
Start: 2025-05-14 | End: 2025-05-14

## 2025-05-14 RX ORDER — ALBUTEROL SULFATE 0.83 MG/ML
2.5 SOLUTION RESPIRATORY (INHALATION) EVERY 6 HOURS PRN
Status: DISCONTINUED | OUTPATIENT
Start: 2025-05-14 | End: 2025-05-18 | Stop reason: HOSPADM

## 2025-05-14 RX ORDER — ALUMINA, MAGNESIA, AND SIMETHICONE 2400; 2400; 240 MG/30ML; MG/30ML; MG/30ML
15 SUSPENSION ORAL EVERY 6 HOURS PRN
Status: DISCONTINUED | OUTPATIENT
Start: 2025-05-14 | End: 2025-05-18 | Stop reason: HOSPADM

## 2025-05-14 RX ORDER — NITROGLYCERIN 0.4 MG/1
0.4 TABLET SUBLINGUAL
Status: DISCONTINUED | OUTPATIENT
Start: 2025-05-14 | End: 2025-05-18 | Stop reason: HOSPADM

## 2025-05-14 RX ORDER — BISACODYL 10 MG
10 SUPPOSITORY, RECTAL RECTAL DAILY PRN
Status: DISCONTINUED | OUTPATIENT
Start: 2025-05-14 | End: 2025-05-18 | Stop reason: HOSPADM

## 2025-05-14 RX ORDER — PANTOPRAZOLE SODIUM 40 MG/10ML
40 INJECTION, POWDER, LYOPHILIZED, FOR SOLUTION INTRAVENOUS EVERY 12 HOURS SCHEDULED
Qty: 600 ML | Refills: 0 | Status: SHIPPED | OUTPATIENT
Start: 2025-05-14

## 2025-05-14 RX ORDER — AMOXICILLIN 250 MG
2 CAPSULE ORAL 2 TIMES DAILY PRN
Status: DISCONTINUED | OUTPATIENT
Start: 2025-05-14 | End: 2025-05-18 | Stop reason: HOSPADM

## 2025-05-14 RX ORDER — ALPRAZOLAM 0.25 MG
0.25 TABLET ORAL EVERY 6 HOURS PRN
Status: DISCONTINUED | OUTPATIENT
Start: 2025-05-14 | End: 2025-05-18 | Stop reason: HOSPADM

## 2025-05-14 RX ORDER — NALOXONE HCL 0.4 MG/ML
0.4 VIAL (ML) INJECTION
Status: DISCONTINUED | OUTPATIENT
Start: 2025-05-14 | End: 2025-05-18 | Stop reason: HOSPADM

## 2025-05-14 RX ORDER — SODIUM CHLORIDE 0.9 % (FLUSH) 0.9 %
10 SYRINGE (ML) INJECTION EVERY 12 HOURS SCHEDULED
Status: DISCONTINUED | OUTPATIENT
Start: 2025-05-14 | End: 2025-05-18 | Stop reason: HOSPADM

## 2025-05-14 RX ORDER — METOPROLOL SUCCINATE 25 MG/1
12.5 TABLET, EXTENDED RELEASE ORAL DAILY
Status: DISCONTINUED | OUTPATIENT
Start: 2025-05-15 | End: 2025-05-18 | Stop reason: HOSPADM

## 2025-05-14 RX ORDER — ALUMINA, MAGNESIA, AND SIMETHICONE 2400; 2400; 240 MG/30ML; MG/30ML; MG/30ML
15 SUSPENSION ORAL EVERY 6 HOURS PRN
Start: 2025-05-14 | End: 2025-05-18 | Stop reason: HOSPADM

## 2025-05-14 RX ORDER — PANTOPRAZOLE SODIUM 40 MG/10ML
40 INJECTION, POWDER, LYOPHILIZED, FOR SOLUTION INTRAVENOUS EVERY 12 HOURS SCHEDULED
Status: DISCONTINUED | OUTPATIENT
Start: 2025-05-14 | End: 2025-05-14 | Stop reason: HOSPADM

## 2025-05-14 RX ORDER — ONDANSETRON 2 MG/ML
4 INJECTION INTRAMUSCULAR; INTRAVENOUS EVERY 4 HOURS PRN
Status: DISCONTINUED | OUTPATIENT
Start: 2025-05-14 | End: 2025-05-18 | Stop reason: HOSPADM

## 2025-05-14 RX ORDER — NALOXONE HCL 0.4 MG/ML
0.4 VIAL (ML) INJECTION
Start: 2025-05-14 | End: 2025-05-18 | Stop reason: HOSPADM

## 2025-05-14 RX ADMIN — Medication 10 ML: at 21:58

## 2025-05-14 RX ADMIN — POTASSIUM CHLORIDE 10 MEQ: 7.46 INJECTION, SOLUTION INTRAVENOUS at 07:28

## 2025-05-14 RX ADMIN — POTASSIUM CHLORIDE 10 MEQ: 7.46 INJECTION, SOLUTION INTRAVENOUS at 09:02

## 2025-05-14 RX ADMIN — ARFORMOTEROL TARTRATE 15 MCG: 15 SOLUTION RESPIRATORY (INHALATION) at 23:51

## 2025-05-14 RX ADMIN — Medication 10 ML: at 10:30

## 2025-05-14 RX ADMIN — SODIUM CHLORIDE 8 MG/HR: 9 INJECTION, SOLUTION INTRAVENOUS at 03:54

## 2025-05-14 RX ADMIN — POTASSIUM CHLORIDE 10 MEQ: 7.46 INJECTION, SOLUTION INTRAVENOUS at 03:54

## 2025-05-14 RX ADMIN — SODIUM CHLORIDE 8 MG/HR: 9 INJECTION, SOLUTION INTRAVENOUS at 10:30

## 2025-05-14 RX ADMIN — METOPROLOL SUCCINATE 12.5 MG: 25 TABLET, EXTENDED RELEASE ORAL at 08:53

## 2025-05-14 RX ADMIN — POTASSIUM CHLORIDE 10 MEQ: 7.46 INJECTION, SOLUTION INTRAVENOUS at 05:30

## 2025-05-14 NOTE — CONSULTS
[facility]   Cardiology Consult Note    Patient Name: Yanni Vega  : 1945  MRN: [id number]  Primary Care Physician:  Carmen Ayon MD  Referring Physician: No ref. provider found    Date of admission: 2025    Subjective   Subjective     Reason for Consultation : Shortness of breath and severe CAD    Chief Complaint : Shortness of breath    HPI:  Yanni Vega is a 80 y.o. female with a history of an inferior wall myocardial infarction and beginning of April of this year.  She had an emergent left heart catheterization which showed 100% thrombotic occlusion of the right coronary treated with PCI and a drug-eluting stent 3.0 33 mm Xience stent.  The left anterior descending is calcified and had a severe 85% proximal stenosis involving the first large diagonal branch we have severe stenosis.  There is a tandem lesion in the mid LAD which is long and tubular with an eccentric 50 about 85%.  The left circumflex artery had 85% proximal stenosis and also disease in the second marginal branch.  Her ejection fraction was 65%.  She had a history of essential hypertension and COPD.  She presented to the emergency room after few days of feeling short short of breath and tired.  She also reported black stools.  Hemoglobin of 6.5 g the patient was admitted for possible GI bleed.  EKG showed sinus rhythm with repolarization abnormalities.  Her high sensitive troponin was 17 without delta.  She was transfused with packed RBCs and the latest hemoglobin was 9.1 g.  Review of Systems   All systems were reviewed and negative except for: Fatigue and shortness of breath    Personal History     Past Medical History:   Diagnosis Date    Arthritis     COPD (chronic obstructive pulmonary disease)     Emphysema lung     Hypertension     Osteoporosis               Family History: Family history is unknown by patient. Otherwise pertinent FHx was reviewed and not pertinent to current issue.    Social History:  reports  that she has quit smoking. Her smoking use included cigarettes. She has a 25 pack-year smoking history. She has never used smokeless tobacco. She reports current alcohol use of about 1.0 standard drink of alcohol per week. She reports that she does not use drugs.    Home Medications:  Fluticasone Furoate-Vilanterol, albuterol, amLODIPine, aspirin, cholecalciferol, docusate sodium, ibandronate, metoprolol succinate XL, nicotine, nitroglycerin, predniSONE, rosuvastatin, and ticagrelor    Allergies:  Allergies   Allergen Reactions    Iodine Anaphylaxis    Lisinopril Swelling       Objective    Objective     Vitals:   Temp:  [97.5 °F (36.4 °C)-98.6 °F (37 °C)] 98.2 °F (36.8 °C)  Heart Rate:  [66-94] 68  Resp:  [14-20] 16  BP: ()/() 110/62      Physical Exam:   Constitutional: Awake, alert, No acute distress    Eyes: PERRLA, sclerae anicteric, no conjunctival injection   HENT: NCAT, mucous membranes moist   Neck: Supple, no thyromegaly, no lymphadenopathy, trachea midline   Respiratory: Clear to auscultation bilaterally, nonlabored respirations    Cardiovascular: RRR, no murmurs, rubs, or gallops, palpable pedal pulses bilaterally   Gastrointestinal: Positive bowel sounds, soft, nontender, nondistended   Musculoskeletal: No bilateral ankle edema, no clubbing or cyanosis to extremities   Psychiatric: Appropriate affect, cooperative   Neurologic: Oriented x 3, strength symmetric in all extremities, Cranial Nerves grossly intact to confrontation, speech clear   Skin: No rashes     Result Review    Result Review:  I have personally reviewed the results from the time of this admission to 5/14/2025 11:22 EDT and agree with these findings:  [x]  Laboratory  []  Microbiology  [x]  Radiology  [x]  EKG/Telemetry   [x]  Cardiology/Vascular   []  Pathology  [x]  Old records  []  Other:  Most notable findings include:     CMP          4/10/2025    06:00 5/13/2025    12:04 5/14/2025    00:41 5/14/2025    05:48   CMP    Glucose 91  97   91    BUN 13  10   9    Creatinine 0.70  0.89   0.88    EGFR 87.0  65.2   66.1    Sodium 139  139   140    Potassium 3.9  3.3  3.1  3.3    Chloride 104  107   112    Calcium 8.4  9.0   7.9    Total Protein  6.2   5.4    Albumin 3.5  4.2   3.4    Globulin  2.0   2.0    Total Bilirubin  0.5   0.6    Alkaline Phosphatase  109   91    AST (SGOT)  23   19    ALT (SGPT)  13   11    Albumin/Globulin Ratio  2.1   1.7    BUN/Creatinine Ratio 18.6  11.2   10.2    Anion Gap 9.9  12.4   8.2       CBC          4/10/2025    06:00 5/13/2025    12:04 5/14/2025    00:41 5/14/2025    05:48 5/14/2025    07:38   CBC   WBC 5.61  8.99   6.75     RBC 3.50  2.22   3.12     Hemoglobin 11.2  6.5  9.6  9.4  9.1    Hematocrit 33.6  21.2  28.9  29.1  27.8    MCV 96.0  95.5   93.3     MCH 32.0  29.3   30.1     MCHC 33.3  30.7   32.3     RDW 14.7  16.3   15.3     Platelets 146  356   239        Lab Results   Component Value Date    TROPONINT 17 (H) 05/14/2025         Assessment & Plan   Assessment / Plan     Brief Patient Summary:  Yanni Vega is a 80 y.o. female who presented with signs and symptoms suggestive of a GI bleed.  Patient has stable after blood transfusion.  She had a recent inferior wall myocardial infarction of 6 weeks ago treated with PCI of the right coronary artery with drug-eluting stent.  She was on dual antiplatelet therapy for prevention of stent thrombosis.  The patient has preserved ejection fraction and residual severe LAD circumflex disease.    Active Hospital Problems:  Active Hospital Problems    Diagnosis     **Acute GI bleeding     Severe anemia     Shortness of breath     Melena     Acute blood loss anemia (ABLA)     Significant drop in hemoglobin     Coronary artery disease          Plan:     I will continue with optimal medical therapy including beta-blockers as tolerated, statin therapy.  The patient is at intermediate risk for cardiovascular complication during the GI procedure.  The  patient will need complete revascularization and coronary bypass graft may be the better option more so now after this GI bleed.  She is accepted for transfer to [facility] in [place], CT surgery( Dr. Pang)  for evaluation for possible coronary bypass graft on the line.    Electronically signed by Rinku Guy MD, 05/14/25, 11:22 AM EDT.

## 2025-05-14 NOTE — PROGRESS NOTES
[facility]     Progress Note    Patient Name: Yanni Vega  : 1945  MRN: [id number]  Primary Care Physician:  Carmen Ayon MD  Date of admission: 2025      Subjective   Brief summary.  Patient admitted with anemia and GI bleed      HPI:  Seen earlier this morning feeling comfortable no chest pain or shortness of breath.  After I left patient called nurse and reported chest pain.  Stat EKG and troponin ordered.    Review of Systems     No reports of further bleed.  No shortness of breath.  No chest tightness or pain reported earlier.  Denies nausea vomiting.      Objective     Vitals:   Temp:  [97.5 °F (36.4 °C)-98.6 °F (37 °C)] 98.2 °F (36.8 °C)  Heart Rate:  [66-94] 68  Resp:  [14-20] 16  BP: ()/() 110/62    Physical Exam :     Elderly female not in acute distress.  Pallor noted.  Heart regular.  Lungs diminished breath sounds but clear abdomen soft nontender.  Extremities no edema    Result Review:  I have personally reviewed the results from the time of this admission to 2025 09:45 EDT and agree with these findings:  [x]  Laboratory  []  Microbiology  []  Radiology  []  EKG/Telemetry   []  Cardiology/Vascular   []  Pathology  []  Old records  []  Other:    Potassium 3.3  Hemoglobin 9.1    Assessment / Plan       Active Hospital Problems:  Active Hospital Problems    Diagnosis     **Acute GI bleeding     Severe anemia     Shortness of breath     Melena     Acute blood loss anemia (ABLA)     Significant drop in hemoglobin     Coronary artery disease    Chest pain    Plan:   Chest pain workup, EKG tropes ordered.  Will use nitro if needed.  Patient n.p.o. for now plan for EGD.  If tropes negative and pain does not further escalates proceed with EGD.    RN notified to notify cardiologist.    Monitor H&H.    Replace potassium.           VTE Prophylaxis:  Mechanical VTE prophylaxis orders are present.        CODE STATUS:   Code Status (Patient has no pulse and is not breathing):  CPR (Attempt to Resuscitate)  Medical Interventions (Patient has pulse or is breathing): Full Support          Warren Corbin MD 05/14/25 09:45 EDT

## 2025-05-14 NOTE — PLAN OF CARE
Goal Outcome Evaluation:         Pt dc with ems to [facility]. Report called to zach.

## 2025-05-14 NOTE — PLAN OF CARE
Goal Outcome Evaluation:  Plan of Care Reviewed With: patient        Progress: improving  Outcome Evaluation: Pt is Sr on tele, HR 60s. /68. On room air. Pt has no complaints of pain at this time. AOx4.

## 2025-05-14 NOTE — CASE MANAGEMENT/SOCIAL WORK
IP:   5/13/25 (Met OBS criteria)  DC:   5/14/25  LOS:   < 2MN  Exception:  Transferring to [facility]

## 2025-05-14 NOTE — PLAN OF CARE
Goal Outcome Evaluation:              Outcome Evaluation: Patient completed 2 units PRBC, hgb redrew, now at 9.6. Protonix drip continued. IV K+ replacement started per nurse driven protocol. Patient has been NPO since midnight. BP continue to be soft. No complaints of pain, nausea, or diarrhea. No BMs this shift. Gastro seen patient overnight. Possible EGD today.

## 2025-05-14 NOTE — CONSULTS
[facility], [place], operated by [facility]  Initial Inpatient Consult Note    Referring Provider: Warren Corbin MD    Reason for Consultation: Melena and acute blood loss anemia    History of present illness:    Patient is 80 y.o. female with known history of COPD, emphysema, hypertension, osteoporosis, arthritis, CAD status post ST elevation with inferior lateral myocardial infarction on 04/09/2025 with drug-eluting stent placement with dual antiplatelet therapy with aspirin 81 mg tablet daily and Brilinta 90 mg orally twice daily admitted with acute shortness of breath, fatigue and melena for last 4 days.  Patient dropped her hemoglobin from baseline value of 11.2 g/dL about a month ago to 6.5 g/dL.  She is currently receiving second unit of PRBC transfusion.    Past Medical History:  Past Medical History:   Diagnosis Date    Arthritis     COPD (chronic obstructive pulmonary disease)     Emphysema lung     Hypertension     Osteoporosis      Past Surgical History:  Past Surgical History:   Procedure Laterality Date    CARDIAC CATHETERIZATION N/A 4/8/2025    Procedure: Left Heart Cath;  Surgeon: Rinku Azevedo MD;  Location: [facility] CATH INVASIVE LOCATION;  Service: Cardiovascular;  Laterality: N/A;    KNEE ARTHROPLASTY Left     ORTHOPEDIC SURGERY      knee      Social History:   Social History     Tobacco Use    Smoking status: Former     Current packs/day: 1.00     Average packs/day: 1 pack/day for 25.0 years (25.0 ttl pk-yrs)     Types: Cigarettes    Smokeless tobacco: Never   Substance Use Topics    Alcohol use: Yes     Alcohol/week: 1.0 standard drink of alcohol     Types: 1 Glasses of wine per week     Comment: ocassionally      Family History:  Family History   Family history unknown: Yes     Home Meds:  Medications Prior to Admission   Medication Sig Dispense Refill Last Dose/Taking    amLODIPine (NORVASC) 5 MG tablet Take 1 tablet by mouth Daily.   5/13/2025    aspirin 81 MG EC tablet Take 1 tablet by mouth Daily.  30 tablet 6 5/13/2025    Breo Ellipta 200-25 MCG/ACT inhaler Inhale 1 puff Daily.   5/13/2025    Cholecalciferol 25 MCG (1000 UT) tablet Take 1 tablet by mouth Daily.   5/13/2025    docusate sodium (COLACE) 100 MG capsule Take 1 capsule by mouth Daily.   5/13/2025    ibandronate (BONIVA) 150 MG tablet Take 1 tablet by mouth Every 30 (Thirty) Days.   Past Month    metoprolol succinate XL (TOPROL-XL) 25 MG 24 hr tablet Take 0.5 tablets by mouth Daily. 30 tablet 6 Taking    nicotine (NICODERM CQ) 21 MG/24HR patch Place 1 patch on the skin as directed by provider Daily. 14 patch 1 Taking    nitroglycerin (NITROSTAT) 0.4 MG SL tablet Place 1 tablet under the tongue Every 5 (Five) Minutes As Needed for Chest Pain (Systolic BP Greater Than 100). Take no more than 3 doses in 15 minutes. 45 tablet 12 Taking As Needed    ProAir RespiClick 108 (90 Base) MCG/ACT inhaler Inhale 2 puffs Every 6 (Six) Hours As Needed for Wheezing or Shortness of Air.   5/13/2025    ticagrelor (BRILINTA) 90 MG tablet tablet Take 1 tablet by mouth 2 (Two) Times a Day. 60 tablet 6 5/13/2025    predniSONE (DELTASONE) 50 MG tablet Take 1 tablet by mouth Take As Directed for 3 doses. Take 1 tablet (50mg) 13 Hours 7 Hours & 1 Hour Prior to Exam 3 tablet 0 Unknown    rosuvastatin (CRESTOR) 20 MG tablet Take 1 tablet by mouth Every Night. 90 tablet 3 Unknown     Current Meds:   [START ON 5/14/2025] metoprolol succinate XL, 12.5 mg, Oral, Daily  rosuvastatin, 20 mg, Oral, Nightly  sodium chloride, 10 mL, Intravenous, Q12H      Allergies:  Allergies   Allergen Reactions    Iodine Anaphylaxis    Lisinopril Swelling       Objective     Vital Signs  Temp:  [97.7 °F (36.5 °C)-98.6 °F (37 °C)] 98.2 °F (36.8 °C)  Heart Rate:  [66-94] 68  Resp:  [14-20] 18  BP: ()/() 120/63  Physical Exam:  General Appearance:    Alert and oriented, normal affect   Head:    Normocephalic, without obvious abnormality, atraumatic   Eyes:          conjunctivae and sclerae  normal, no icterus, pupils round and reactive to light   Oral cavity: Moist and intact, normal dentation   Neck:   Supple, no adenopathy   Chest Wall/Lungs:    No abnormalities observed, equal on expansion bilaterally, no use of extrarespiratory muscles noted   Abdomen:     Soft, nondistended, nontender; normal bowel sounds, no hepatospleenomegaly, no ascites.  Rectal examination performed in presence of chaperone (RN) darkish brown stool on examination   Extremities:   no edema, clubbing, or cyanosis   Skin:   No bruising or rash   Psychiatric:  normal mood and insight     Results Review:   I reviewed the patient's new clinical results.    Results from last 7 days   Lab Units 05/13/25  1204   WBC 10*3/mm3 8.99   HEMOGLOBIN g/dL 6.5*   MCV fL 95.5   PLATELETS 10*3/mm3 356         Lab 05/13/25  1204   NEUTROS ABS 6.74     Results from last 7 days   Lab Units 05/13/25  1204   BUN mg/dL 10   CREATININE mg/dL 0.89   SODIUM mmol/L 139   POTASSIUM mmol/L 3.3*   CHLORIDE mmol/L 107   CO2 mmol/L 19.6*   GLUCOSE mg/dL 97          Results from last 7 days   Lab Units 05/13/25  1204   AST (SGOT) U/L 23   ALT (SGPT) U/L 13   ALK PHOS U/L 109   BILIRUBIN mg/dL 0.5   TOTAL PROTEIN g/dL 6.2   ALBUMIN g/dL 4.2                Radiology:  XR Chest 1 View  Result Date: 5/13/2025  Impression: Emphysematous changes. No acute infiltrate Electronically Signed: Anirudh Paez MD  5/13/2025 12:08 PM EDT  Workstation ID: [id number]      Impression:     Acute GI bleeding    Coronary artery disease    Severe anemia    Shortness of breath     Impression:    Melena  Acute blood loss anemia  Dual antiplatelet therapy  Recent MI with drug-eluting stent placement    Plan and Recommendations: Patient admitted with acute blood loss anemia with symptoms of melena, shortness of breath and fatigue.  She has significantly dropped her hemoglobin from 11.2 g/dL to 6.5 g/dL.  She is currently receiving second unit of PRBC transfusion.  Patient is currently on  Protonix gtt.  Stool are darkish brown in color on rectal examination.  She is hemodynamically stable.  Will hold aspirin and Brilinta for now and will plan for EGD tomorrow.      I discussed the patient's findings and my recommendations with patient, nursing staff, and consulting provider.      Electronically signed by Brett Hassan MD, 05/13/25, 10:01 PM EDT.

## 2025-05-14 NOTE — TELEPHONE ENCOUNTER
Spoke to Daron in bed board to transfer pt from [place].  Provider: Dr. Pang  Dx: Type B dissection     Will call when bed is ready.

## 2025-05-14 NOTE — DISCHARGE SUMMARY
[facility]         DISCHARGE SUMMARY    Patient Name: Yanni Vega  : 1945  MRN: [id number]    Date of Admission: 2025  Date of Discharge: May 14  Primary Care Physician: Carmen Ayon MD    Consults       Date and Time Order Name Status Description    2025  4:30 PM Inpatient Gastroenterology Consult      2025  3:18 PM Cardiology (on-call MD unless specified)      2025  3:01 PM IP General Consult (Use specialty-specific consult if known)              Presenting Problem:   Acute GI bleeding [K92.2]  Acute anemia [D64.9]    Active and Resolved Hospital Problems:  Active Hospital Problems    Diagnosis POA    Severe anemia [D64.9] Yes    Shortness of breath [R06.02] Yes    Acute blood loss anemia (ABLA) [D62] Unknown    Coronary artery disease [I25.10] Yes      Resolved Hospital Problems    Diagnosis POA    **Acute GI bleeding [K92.2] Yes    Melena [K92.1] Unknown    Significant drop in hemoglobin [D58.2] Unknown         Hospital Course     Hospital Course:  Yanni Vega is a 80 y.o. female admitted to hospital for weakness shortness of breath, further workup revealed anemia and patient reports dark-colored stools.  Appears to have GI bleed.  Patient with recent stent 6 weeks ago and on DAPT therapy.  Patient was seen by cardiologist Dr. Jose Guadalupe Guy who has seen patient yesterday and today, patient had an episode of chest pain this morning but after that she has been stable her cardiac enzymes and troponin are stable.  He recommended transferring her to tertiary care facility at [facility] for cardiac revascularization surgery and doing definitive workup for GI.  Patient carries a moderate to intermediate risk for GI procedures considering her recent stent and holding anticoagulation at this point.    Dr. Guy has spoken to cardiothoracic surgeon Dr. Pang.  Who has accepted patient.  I already spoke to him also.  [facility] will [facility] once bed is available.  Patient informed and patient has agreed for transfer      DISCHARGE Follow Up Recommendations for labs and diagnostics:   Discharge/transfer to [facility]      Day of Discharge     Vital Signs:  Temp:  [97.5 °F (36.4 °C)-98.6 °F (37 °C)] 98.4 °F (36.9 °C)  Heart Rate:  [64-78] 64  Resp:  [14-18] 16  BP: ()/(41-72) 103/60    Physical Exam:    Elderly female not in acute distress.  Heart regular.  Lungs clear.  Abdomen soft.  Extremities no edema.  Neuro awake alert and oriented      Pertinent  and/or Most Recent Results     LAB RESULTS:      Lab 05/14/25  0738 05/14/25  0548 05/14/25  0041 05/13/25  1204   WBC  --  6.75  --  8.99   HEMOGLOBIN 9.1* 9.4* 9.6* 6.5*   HEMATOCRIT 27.8* 29.1* 28.9* 21.2*   PLATELETS  --  239  --  356   NEUTROS ABS  --  4.68  --  6.74   IMMATURE GRANS (ABS)  --  0.02  --  0.08*   LYMPHS ABS  --  1.05  --  1.05   MONOS ABS  --  0.79  --  0.94*   EOS ABS  --  0.17  --  0.14   MCV  --  93.3  --  95.5         Lab 05/14/25  0548 05/14/25  0041 05/13/25  1204   SODIUM 140  --  139   POTASSIUM 3.3* 3.1* 3.3*   CHLORIDE 112*  --  107   CO2 19.8*  --  19.6*   ANION GAP 8.2  --  12.4   BUN 9  --  10   CREATININE 0.88  --  0.89   EGFR 66.1  --  65.2   GLUCOSE 91  --  97   CALCIUM 7.9*  --  9.0         Lab 05/14/25  0548 05/13/25  1204   TOTAL PROTEIN 5.4* 6.2   ALBUMIN 3.4* 4.2   GLOBULIN 2.0 2.0   ALT (SGPT) 11 13   AST (SGOT) 19 23   BILIRUBIN 0.6 0.5   ALK PHOS 91 109         Lab 05/14/25  0959 05/13/25  1345 05/13/25  1204   PROBNP  --   --  924.8   HSTROP T 17* 19* 18*             Lab 05/13/25  1508   ABO TYPING A   RH TYPING Positive   ANTIBODY SCREEN Negative         Brief Urine Lab Results       None          Microbiology Results (last 10 days)       ** No results found for the last 240 hours. **            PROCEDURES:    XR Chest 1 View  Result Date: 5/13/2025  Impression: Impression: Emphysematous changes. No acute infiltrate Electronically  Signed: Anirudh Paez MD  5/13/2025 12:08 PM EDT  Workstation ID: [id number]              Results for orders placed during the hospital encounter of 04/08/25    Adult Transthoracic Echo Complete W/ Cont if Necessary Per Protocol    Interpretation Summary    Left ventricular ejection fraction appears to be 61 - 65%.    Left ventricular wall thickness is consistent with mild to moderate concentric hypertrophy.    Left ventricular diastolic function is consistent with (grade I) impaired relaxation.    The left atrial cavity is mildly dilated.    There are myxomatous changes of the mitral valve apparatus present.    Estimated right ventricular systolic pressure from tricuspid regurgitation is moderately elevated (45-55 mmHg).      Labs Pending at Discharge:  Pending Labs       Order Current Status    Potassium In process              Discharge Details        Discharge Medications        New Medications        Instructions Start Date   acetaminophen 325 MG tablet  Commonly known as: TYLENOL   650 mg, Oral, Every 4 Hours PRN      ALPRAZolam 0.25 MG tablet  Commonly known as: XANAX   0.25 mg, Oral, Every 6 Hours PRN      aluminum-magnesium hydroxide-simethicone 400-400-40 MG/5ML suspension  Commonly known as: MAALOX MAX   15 mL, Oral, Every 6 Hours PRN      morphine 2 MG/ML injection   2 mg, Intravenous, Every 4 Hours PRN      naloxone 0.4 MG/ML injection  Commonly known as: NARCAN   0.4 mg, Intravenous, Every 5 Minutes PRN      ondansetron 2 mg/mL injection  Commonly known as: ZOFRAN   4 mg, Intravenous, Every 4 Hours PRN      pantoprazole (PROTONIX) 40 mg IVPB in 100 mL NS (VTB)   8 mg/hr (8 mg/hr), Intravenous, Continuous      temazepam 15 MG capsule  Commonly known as: RESTORIL   15 mg, Oral, Nightly PRN             Continue These Medications        Instructions Start Date   Breo Ellipta 200-25 MCG/ACT inhaler  Generic drug: Fluticasone Furoate-Vilanterol   1 puff, Daily      Brilinta 90 MG tablet tablet  Generic drug:  ticagrelor   90 mg, Oral, 2 Times Daily      cholecalciferol 25 MCG (1000 UT) tablet  Commonly known as: VITAMIN D3   1,000 Units, Daily      docusate sodium 100 MG capsule  Commonly known as: COLACE   100 mg, Daily      ibandronate 150 MG tablet  Commonly known as: BONIVA   150 mg, Every 30 Days      metoprolol succinate XL 25 MG 24 hr tablet  Commonly known as: TOPROL-XL   12.5 mg, Oral, Daily      nicotine 21 MG/24HR patch  Commonly known as: NICODERM CQ   1 patch, Transdermal, Every 24 Hours Scheduled      nitroglycerin 0.4 MG SL tablet  Commonly known as: NITROSTAT   0.4 mg, Sublingual, Every 5 Minutes PRN, Take no more than 3 doses in 15 minutes.      ProAir RespiClick 108 (90 Base) MCG/ACT inhaler  Generic drug: albuterol   2 puffs, Every 6 Hours PRN      rosuvastatin 20 MG tablet  Commonly known as: CRESTOR   20 mg, Oral, Nightly             Stop These Medications      amLODIPine 5 MG tablet  Commonly known as: NORVASC     Aspirin Low Dose 81 MG EC tablet  Generic drug: aspirin     predniSONE 50 MG tablet  Commonly known as: DELTASONE              Allergies   Allergen Reactions    Iodine Anaphylaxis    Lisinopril Swelling         Discharge Disposition:    [facility] ([place])    Diet:    Heart healthy diet for today and n.p.o. postmidnight in anticipation of surgery    Discharge Activity:     Activity Instructions       Activity as Tolerated              No future appointments.    Additional Instructions for the Follow-ups that You Need to Schedule       Discharge Follow-up with PCP   As directed       Currently Documented PCP:    Carmen Ayon MD    PCP Phone Number:    [phone]     Follow Up Details: Post discharge from [facility]        Discharge Follow-up with Specified Provider: Dr. Jose Guadalupe Guy; 2 Weeks   As directed      To: Dr. Jose Guadalupe Guy   Follow Up: 2 Weeks                Time spent on Discharge including face to face service: 47 minutes.            I have dictated this note  utilizing Dragon Dictation.             Please note that portions of this note were completed with a voice recognition program.             Part of this note may be an electronic transcription/translation of spoken language to printed text         using the Dragon Dictation System.       Electronically signed by Warren Corbin MD, 05/14/25, 2:13 PM EDT.

## 2025-05-15 ENCOUNTER — APPOINTMENT (OUTPATIENT)
Dept: CARDIOLOGY | Facility: HOSPITAL | Age: 80
DRG: 378 | End: 2025-05-15
Payer: MEDICARE

## 2025-05-15 ENCOUNTER — APPOINTMENT (OUTPATIENT)
Dept: RESPIRATORY THERAPY | Facility: HOSPITAL | Age: 80
DRG: 378 | End: 2025-05-15
Payer: MEDICARE

## 2025-05-15 ENCOUNTER — ANESTHESIA EVENT (OUTPATIENT)
Dept: GASTROENTEROLOGY | Facility: HOSPITAL | Age: 80
End: 2025-05-15
Payer: MEDICARE

## 2025-05-15 LAB
ANION GAP SERPL CALCULATED.3IONS-SCNC: 9.4 MMOL/L (ref 5–15)
BASOPHILS # BLD AUTO: 0.03 10*3/MM3 (ref 0–0.2)
BASOPHILS NFR BLD AUTO: 0.5 % (ref 0–1.5)
BH BB BLOOD EXPIRATION DATE: NORMAL
BH BB BLOOD EXPIRATION DATE: NORMAL
BH BB BLOOD TYPE BARCODE: 6200
BH BB BLOOD TYPE BARCODE: 6200
BH BB DISPENSE STATUS: NORMAL
BH BB DISPENSE STATUS: NORMAL
BH BB PRODUCT CODE: NORMAL
BH BB PRODUCT CODE: NORMAL
BH BB UNIT NUMBER: NORMAL
BH BB UNIT NUMBER: NORMAL
BH CV XLRA MEAS - DIST GSV CALF DIST LEFT: 0.08 CM
BH CV XLRA MEAS - DIST GSV CALF DIST RIGHT: 0.13 CM
BH CV XLRA MEAS - DIST GSV THIGH DIST LEFT: 0.16 CM
BH CV XLRA MEAS - DIST GSV THIGH DIST RIGHT: 0.2 CM
BH CV XLRA MEAS - DIST LSV CALF DIST LEFT: 0.15 CM
BH CV XLRA MEAS - DIST LSV CALF DIST RIGHT: 0.09 CM
BH CV XLRA MEAS - GSV ANKLE DIST LEFT: 0.05 CM
BH CV XLRA MEAS - GSV ANKLE DIST RIGHT: 0.15 CM
BH CV XLRA MEAS - GSV KNEE DIST LEFT: 0.16 CM
BH CV XLRA MEAS - GSV KNEE DIST RIGHT: 0.18 CM
BH CV XLRA MEAS - GSV ORIGIN DIST LEFT: 0.53 CM
BH CV XLRA MEAS - GSV ORIGIN DIST RIGHT: 0.56 CM
BH CV XLRA MEAS - MID GSV CALF LEFT: 0.09 CM
BH CV XLRA MEAS - MID GSV CALF RIGHT: 0.1 CM
BH CV XLRA MEAS - MID GSV THIGH  LEFT: 0.19 CM
BH CV XLRA MEAS - MID GSV THIGH  RIGHT: 0.26 CM
BH CV XLRA MEAS - MID LSV CALF DIST LEFT: 0.08 CM
BH CV XLRA MEAS - MID LSV CALF DIST RIGHT: 0.11 CM
BH CV XLRA MEAS - PROX GSV CALF DIST LEFT: 0.14 CM
BH CV XLRA MEAS - PROX GSV CALF DIST RIGHT: 0.11 CM
BH CV XLRA MEAS - PROX GSV THIGH  LEFT: 0.16 CM
BH CV XLRA MEAS - PROX GSV THIGH  RIGHT: 0.32 CM
BH CV XLRA MEAS - PROX LSV CALF DIST LEFT: 0.14 CM
BH CV XLRA MEAS LEFT DIST CCA EDV: -13.3 CM/SEC
BH CV XLRA MEAS LEFT DIST CCA PSV: -86.2 CM/SEC
BH CV XLRA MEAS LEFT DIST ICA EDV: -28.6 CM/SEC
BH CV XLRA MEAS LEFT DIST ICA PSV: -95.1 CM/SEC
BH CV XLRA MEAS LEFT ICA/CCA RATIO: 1.38
BH CV XLRA MEAS LEFT MID CCA EDV: 20.3 CM/SEC
BH CV XLRA MEAS LEFT MID CCA PSV: 87.6 CM/SEC
BH CV XLRA MEAS LEFT MID ICA EDV: -16.9 CM/SEC
BH CV XLRA MEAS LEFT MID ICA PSV: -77 CM/SEC
BH CV XLRA MEAS LEFT PROX CCA EDV: 14.7 CM/SEC
BH CV XLRA MEAS LEFT PROX CCA PSV: 80.6 CM/SEC
BH CV XLRA MEAS LEFT PROX ECA PSV: -94.4 CM/SEC
BH CV XLRA MEAS LEFT PROX ICA EDV: 34.9 CM/SEC
BH CV XLRA MEAS LEFT PROX ICA PSV: 119.1 CM/SEC
BH CV XLRA MEAS LEFT PROX SCLA PSV: 142.7 CM/SEC
BH CV XLRA MEAS LEFT VERTEBRAL A EDV: 11.4 CM/SEC
BH CV XLRA MEAS LEFT VERTEBRAL A PSV: 47.9 CM/SEC
BH CV XLRA MEAS RIGHT DIST CCA EDV: 13.7 CM/SEC
BH CV XLRA MEAS RIGHT DIST CCA PSV: 74.6 CM/SEC
BH CV XLRA MEAS RIGHT DIST ICA EDV: -18 CM/SEC
BH CV XLRA MEAS RIGHT DIST ICA PSV: -71.3 CM/SEC
BH CV XLRA MEAS RIGHT ICA/CCA RATIO: 1.4
BH CV XLRA MEAS RIGHT MID ICA EDV: 24 CM/SEC
BH CV XLRA MEAS RIGHT MID ICA PSV: 104.3 CM/SEC
BH CV XLRA MEAS RIGHT PROX CCA EDV: 16.2 CM/SEC
BH CV XLRA MEAS RIGHT PROX CCA PSV: 77 CM/SEC
BH CV XLRA MEAS RIGHT PROX ECA EDV: -13.4 CM/SEC
BH CV XLRA MEAS RIGHT PROX ECA PSV: -200 CM/SEC
BH CV XLRA MEAS RIGHT PROX ICA EDV: -20.4 CM/SEC
BH CV XLRA MEAS RIGHT PROX ICA PSV: -85.5 CM/SEC
BH CV XLRA MEAS RIGHT PROX SCLA PSV: 190.2 CM/SEC
BH CV XLRA MEAS RIGHT VERTEBRAL A EDV: 9.9 CM/SEC
BH CV XLRA MEAS RIGHT VERTEBRAL A PSV: 61.5 CM/SEC
BUN SERPL-MCNC: 9 MG/DL (ref 8–23)
BUN/CREAT SERPL: 10.5 (ref 7–25)
CALCIUM SPEC-SCNC: 8.1 MG/DL (ref 8.6–10.5)
CHLORIDE SERPL-SCNC: 113 MMOL/L (ref 98–107)
CLOSE TME COLL+ADP + EPINEP PNL BLD: 35 % (ref 86–100)
CO2 SERPL-SCNC: 19.6 MMOL/L (ref 22–29)
CREAT SERPL-MCNC: 0.86 MG/DL (ref 0.57–1)
CROSSMATCH INTERPRETATION: NORMAL
CROSSMATCH INTERPRETATION: NORMAL
DEPRECATED RDW RBC AUTO: 49.8 FL (ref 37–54)
EGFRCR SERPLBLD CKD-EPI 2021: 68 ML/MIN/1.73
EOSINOPHIL # BLD AUTO: 0.2 10*3/MM3 (ref 0–0.4)
EOSINOPHIL NFR BLD AUTO: 3.3 % (ref 0.3–6.2)
ERYTHROCYTE [DISTWIDTH] IN BLOOD BY AUTOMATED COUNT: 14.5 % (ref 12.3–15.4)
GLUCOSE SERPL-MCNC: 89 MG/DL (ref 65–99)
HCT VFR BLD AUTO: 29.3 % (ref 34–46.6)
HGB BLD-MCNC: 9 G/DL (ref 12–15.9)
IMM GRANULOCYTES # BLD AUTO: 0.02 10*3/MM3 (ref 0–0.05)
IMM GRANULOCYTES NFR BLD AUTO: 0.3 % (ref 0–0.5)
LYMPHOCYTES # BLD AUTO: 1.17 10*3/MM3 (ref 0.7–3.1)
LYMPHOCYTES NFR BLD AUTO: 19.5 % (ref 19.6–45.3)
MCH RBC QN AUTO: 29.1 PG (ref 26.6–33)
MCHC RBC AUTO-ENTMCNC: 30.7 G/DL (ref 31.5–35.7)
MCV RBC AUTO: 94.8 FL (ref 79–97)
MONOCYTES # BLD AUTO: 0.87 10*3/MM3 (ref 0.1–0.9)
MONOCYTES NFR BLD AUTO: 14.5 % (ref 5–12)
NEUTROPHILS NFR BLD AUTO: 3.71 10*3/MM3 (ref 1.7–7)
NEUTROPHILS NFR BLD AUTO: 61.9 % (ref 42.7–76)
NRBC BLD AUTO-RTO: 0 /100 WBC (ref 0–0.2)
PLATELET # BLD AUTO: 242 10*3/MM3 (ref 140–450)
PMV BLD AUTO: 10.8 FL (ref 6–12)
POTASSIUM SERPL-SCNC: 3.6 MMOL/L (ref 3.5–5.2)
RBC # BLD AUTO: 3.09 10*6/MM3 (ref 3.77–5.28)
SODIUM SERPL-SCNC: 142 MMOL/L (ref 136–145)
UNIT  ABO: NORMAL
UNIT  ABO: NORMAL
UNIT  RH: NORMAL
UNIT  RH: NORMAL
WBC NRBC COR # BLD AUTO: 6 10*3/MM3 (ref 3.4–10.8)

## 2025-05-15 PROCEDURE — 93970 EXTREMITY STUDY: CPT

## 2025-05-15 PROCEDURE — 94726 PLETHYSMOGRAPHY LUNG VOLUMES: CPT

## 2025-05-15 PROCEDURE — 93970 EXTREMITY STUDY: CPT | Performed by: SURGERY

## 2025-05-15 PROCEDURE — 85576 BLOOD PLATELET AGGREGATION: CPT

## 2025-05-15 PROCEDURE — 80048 BASIC METABOLIC PNL TOTAL CA: CPT | Performed by: NURSE PRACTITIONER

## 2025-05-15 PROCEDURE — 94760 N-INVAS EAR/PLS OXIMETRY 1: CPT

## 2025-05-15 PROCEDURE — 94729 DIFFUSING CAPACITY: CPT

## 2025-05-15 PROCEDURE — 99222 1ST HOSP IP/OBS MODERATE 55: CPT | Performed by: STUDENT IN AN ORGANIZED HEALTH CARE EDUCATION/TRAINING PROGRAM

## 2025-05-15 PROCEDURE — 99222 1ST HOSP IP/OBS MODERATE 55: CPT | Performed by: INTERNAL MEDICINE

## 2025-05-15 PROCEDURE — 85025 COMPLETE CBC W/AUTO DIFF WBC: CPT | Performed by: NURSE PRACTITIONER

## 2025-05-15 PROCEDURE — 94761 N-INVAS EAR/PLS OXIMETRY MLT: CPT

## 2025-05-15 PROCEDURE — 93880 EXTRACRANIAL BILAT STUDY: CPT | Performed by: SURGERY

## 2025-05-15 PROCEDURE — 94799 UNLISTED PULMONARY SVC/PX: CPT

## 2025-05-15 PROCEDURE — 93880 EXTRACRANIAL BILAT STUDY: CPT

## 2025-05-15 PROCEDURE — 94060 EVALUATION OF WHEEZING: CPT

## 2025-05-15 RX ADMIN — ROSUVASTATIN CALCIUM 20 MG: 20 TABLET, FILM COATED ORAL at 01:14

## 2025-05-15 RX ADMIN — ARFORMOTEROL TARTRATE 15 MCG: 15 SOLUTION RESPIRATORY (INHALATION) at 07:53

## 2025-05-15 RX ADMIN — ROSUVASTATIN CALCIUM 20 MG: 20 TABLET, FILM COATED ORAL at 21:40

## 2025-05-15 RX ADMIN — PANTOPRAZOLE SODIUM 8 MG/HR: 40 INJECTION, POWDER, FOR SOLUTION INTRAVENOUS at 21:40

## 2025-05-15 RX ADMIN — ALBUTEROL SULFATE 2.5 MG: 2.5 SOLUTION RESPIRATORY (INHALATION) at 17:18

## 2025-05-15 RX ADMIN — BUDESONIDE 0.5 MG: 0.5 INHALANT RESPIRATORY (INHALATION) at 20:17

## 2025-05-15 RX ADMIN — ARFORMOTEROL TARTRATE 15 MCG: 15 SOLUTION RESPIRATORY (INHALATION) at 20:18

## 2025-05-15 RX ADMIN — PANTOPRAZOLE SODIUM 8 MG/HR: 40 INJECTION, POWDER, FOR SOLUTION INTRAVENOUS at 12:08

## 2025-05-15 RX ADMIN — Medication 10 ML: at 09:43

## 2025-05-15 RX ADMIN — BUDESONIDE 0.5 MG: 0.5 INHALANT RESPIRATORY (INHALATION) at 07:53

## 2025-05-15 RX ADMIN — PANTOPRAZOLE SODIUM 8 MG/HR: 40 INJECTION, POWDER, FOR SOLUTION INTRAVENOUS at 17:02

## 2025-05-15 RX ADMIN — Medication 10 ML: at 21:40

## 2025-05-15 RX ADMIN — PANTOPRAZOLE SODIUM 8 MG/HR: 40 INJECTION, POWDER, FOR SOLUTION INTRAVENOUS at 06:36

## 2025-05-15 RX ADMIN — PANTOPRAZOLE SODIUM 8 MG/HR: 40 INJECTION, POWDER, FOR SOLUTION INTRAVENOUS at 01:14

## 2025-05-15 RX ADMIN — METOPROLOL SUCCINATE 12.5 MG: 25 TABLET, EXTENDED RELEASE ORAL at 09:42

## 2025-05-15 NOTE — PLAN OF CARE
Goal Outcome Evaluation:  Plan of Care Reviewed With: patient        Progress: improving  Outcome Evaluation: Pt admitted from [facility]. for cardiac revascularization procedure. Pt also had GI bleed, was given blood transfusion before transfer to our hospital, currently on Protonix drip. Will update POC as needed

## 2025-05-15 NOTE — PLAN OF CARE
Goal Outcome Evaluation:  Plan of Care Reviewed With: patient        Progress: no change  Outcome Evaluation: VSS. Protonix gtt continues. NPO after MN for EGD in am. Consent signed and on chart. Continue POC.

## 2025-05-15 NOTE — CONSULTS
[facility]  Initial Inpatient Consult Note    Referring Provider: Dr. Pang    Reason for Consultation: Melena    Subjective     History of present illness:    80 y.o. female, transferred from [facility] for evaluation for CABG, they were asked to see for melena.  Patient reports several day history of melena which has not resolved.  She had no other symptoms.  She denies abdominal pain.  She has rare heartburn.  No regular NSAIDs.  She was a longtime smoker until her recent heart attack 6 weeks ago.  No difficulty swallowing.  No nausea or vomiting.  Appetite is good weight is stable.    She reports that she did not have any medical problems until her heart attack 6 weeks ago.  She has been on aspirin and Brilinta since that time.  She has never had an EGD or colonoscopy.    hemoglobin on presentation to [facility] 2 days ago was 6.5.  Last hemoglobin prior to that was 11.2  1-month ago.  She responded appropriately to transfusion.    Past Medical History:  Past Medical History:   Diagnosis Date    Arthritis     COPD (chronic obstructive pulmonary disease)     Emphysema lung     Hypertension     Osteoporosis      Past Surgical History:  Past Surgical History:   Procedure Laterality Date    CARDIAC CATHETERIZATION N/A 4/8/2025    Procedure: Left Heart Cath;  Surgeon: Rinku Azevedo MD;  Location: [facility] INVASIVE LOCATION;  Service: Cardiovascular;  Laterality: N/A;    KNEE ARTHROPLASTY Left     ORTHOPEDIC SURGERY      knee      Social History:   Social History     Tobacco Use    Smoking status: Former     Current packs/day: 1.00     Average packs/day: 1 pack/day for 25.0 years (25.0 ttl pk-yrs)     Types: Cigarettes    Smokeless tobacco: Never   Substance Use Topics    Alcohol use: Yes     Alcohol/week: 1.0 standard drink of alcohol     Types: 1 Glasses of wine per week     Comment: ocassionally      Family History:  Family History   Family history unknown: Yes       Home  Meds:  Medications Prior to Admission   Medication Sig Dispense Refill Last Dose/Taking    acetaminophen (TYLENOL) 325 MG tablet Take 2 tablets by mouth Every 4 (Four) Hours As Needed for Mild Pain.       ALPRAZolam (XANAX) 0.25 MG tablet Take 1 tablet by mouth Every 6 (Six) Hours As Needed for Anxiety or Sleep (Agitation) for up to 6 days.   Unknown    aluminum-magnesium hydroxide-simethicone (MAALOX MAX) 400-400-40 MG/5ML suspension Take 15 mL by mouth Every 6 (Six) Hours As Needed for Heartburn.   Unknown    Breo Ellipta 200-25 MCG/ACT inhaler Inhale 1 puff Daily.   Unknown    Cholecalciferol 25 MCG (1000 UT) tablet Take 1 tablet by mouth Daily.   Unknown    docusate sodium (COLACE) 100 MG capsule Take 1 capsule by mouth Daily.   Unknown    ibandronate (BONIVA) 150 MG tablet Take 1 tablet by mouth Every 30 (Thirty) Days.   Unknown    metoprolol succinate XL (TOPROL-XL) 25 MG 24 hr tablet Take 0.5 tablets by mouth Daily. 30 tablet 6 Unknown    morphine 2 MG/ML injection Infuse 1 mL into a venous catheter Every 4 (Four) Hours As Needed for Severe Pain for up to 4 days.   Unknown    naloxone (NARCAN) 0.4 MG/ML injection Infuse 1 mL into a venous catheter Every 5 (Five) Minutes As Needed for Respiratory Depression.   Unknown    nicotine (NICODERM CQ) 21 MG/24HR patch Place 1 patch on the skin as directed by provider Daily. 14 patch 1 Unknown    nitroglycerin (NITROSTAT) 0.4 MG SL tablet Place 1 tablet under the tongue Every 5 (Five) Minutes As Needed for Chest Pain (Systolic BP Greater Than 100). Take no more than 3 doses in 15 minutes. 45 tablet 12 Unknown    ondansetron (ZOFRAN) 2 mg/mL injection Infuse 2 mL into a venous catheter Every 4 (Four) Hours As Needed for Nausea or Vomiting.   Unknown    pantoprazole (PROTONIX) 40 MG injection Infuse 10 mL into a venous catheter Every 12 (Twelve) Hours. Indications: Gastrointestinal (GI) Bleed 600 mL 0 Unknown    ProAir RespiClick 108 (90 Base) MCG/ACT inhaler Inhale 2  puffs Every 6 (Six) Hours As Needed for Wheezing or Shortness of Air.   Unknown    rosuvastatin (CRESTOR) 20 MG tablet Take 1 tablet by mouth Every Night. 90 tablet 3 Unknown    temazepam (RESTORIL) 15 MG capsule Take 1 capsule by mouth At Night As Needed for Sleep for up to 6 days.   Unknown    ticagrelor (BRILINTA) 90 MG tablet tablet Take 1 tablet by mouth 2 (Two) Times a Day. 60 tablet 6 Unknown     Current Meds:   budesonide, 0.5 mg, Nebulization, BID - RT   And  arformoterol, 15 mcg, Nebulization, BID - RT  metoprolol succinate XL, 12.5 mg, Oral, Daily  nicotine, 1 patch, Transdermal, Q24H  rosuvastatin, 20 mg, Oral, Nightly  sodium chloride, 10 mL, Intravenous, Q12H      Allergies:  Allergies   Allergen Reactions    Iodine Anaphylaxis    Lisinopril Swelling     Review of Systems  Pertinent items are noted in HPI     Objective     Vital Signs  Temp:  [97.9 °F (36.6 °C)-98.6 °F (37 °C)] 98.1 °F (36.7 °C)  Heart Rate:  [61-88] 63  Resp:  [16-22] 18  BP: ()/(60-68) 97/62  Physical Exam:  General Appearance:    Alert, cooperative, in no acute distress   Head:    Normocephalic, without obvious abnormality, atraumatic   Eyes:          conjunctivae and sclerae normal, no   icterus   Throat:   no thrush, oral mucosa moist   Neck:   Supple, no adenopathy   Lungs:     Clear to auscultation bilaterally    Heart:    Regular rhythm and normal rate    Chest Wall:    No abnormalities observed   Abdomen:     Soft, nondistended, nontender; normal bowel sounds   Extremities:   no edema, no redness   Skin:   No bruising or rash   Psychiatric:  normal mood and insight     Results Review:   I reviewed the patient's new clinical results.    Results from last 7 days   Lab Units 05/15/25  0259 05/14/25  1943 05/14/25  1532 05/14/25  0738 05/14/25  0548   WBC 10*3/mm3 6.00 6.50  --   --  6.75   HEMOGLOBIN g/dL 9.0* 10.2* 9.9*   < > 9.4*   HEMATOCRIT % 29.3* 31.2* 30.2*   < > 29.1*   PLATELETS 10*3/mm3 242 253  --   --  239    <  > = values in this interval not displayed.     Results from last 7 days   Lab Units 05/15/25  0259 05/14/25  1943 05/14/25  1351 05/14/25  0548 05/14/25  0041 05/13/25  1204   SODIUM mmol/L 142 140  --  140  --  139   POTASSIUM mmol/L 3.6 3.9 3.9 3.3*   < > 3.3*   CHLORIDE mmol/L 113* 112*  --  112*  --  107   CO2 mmol/L 19.6* 19.0*  --  19.8*  --  19.6*   BUN mg/dL 9 8  --  9  --  10   CREATININE mg/dL 0.86 1.03*  --  0.88  --  0.89   CALCIUM mg/dL 8.1* 8.5*  --  7.9*  --  9.0   BILIRUBIN mg/dL  --   --   --  0.6  --  0.5   ALK PHOS U/L  --   --   --  91  --  109   ALT (SGPT) U/L  --   --   --  11  --  13   AST (SGOT) U/L  --   --   --  19  --  23   GLUCOSE mg/dL 89 81  --  91  --  97    < > = values in this interval not displayed.         Lab Results   Lab Value Date/Time    LIPASE 25 04/08/2025 1522       Radiology:  No orders to display       Assessment & Plan   Active Hospital Problems    Diagnosis     **GI bleed        Assessment:  Melena  Abla  Severe cad with PCI/drug eluting stent placement 6 weeks ago on brilinta/asa    Plan:  Patient's had a solid breakfast this morning so we will plan on EGD tomorrow.  Melena has resolved but she did have a fairly significant anemia and will need ongoing antiplatelet therapy given her recent drug-eluting stent.  Discussed with the patient that she is at moderate cardiac risk based on cardiology note for sedation.  Discussed risks and benefits of the procedure with the patient and she is agreeable with proceeding.  Twice daily PPI  Continue to follow H&H      I discussed the patients findings and my recommendations with patient.          Vicky Mccoy M.D.  [facility]  [phone]

## 2025-05-15 NOTE — H&P
Name: Yanni Vega ADMIT: 2025   : 1945  PCP: Carmen Ayon MD    MRN: [id number] LOS: 1 days   AGE/SEX: 80 y.o. female  ROOM: [facility]     Chief Complaint: Multivessel CAD/STEMI      Subjective     History of Present Illness  Patient is an 80 y.o. female with a past medical history including HTN, tobacco abuse (reports quitting 6 weeks ago following her STEMI), COPD, arthritis, osteoporosis, MI, and CAD with recent drug-eluting stent placement and DAPT (Brillinta & ASA) 25 who initially presented to [facility] on 25 with a chief complaint of shortness of breath, fatigue, and melena for the last 4 days. She was found to be anemic with hemoglobin 6.5 and was transfused 2 units of PRBCs with appropriate response. Gastroenterology was consulted and she was started on a Protonix drip and her Aspirin and Brilinta were held. On 25 she was transferred to [facility] for further evaluation and treatment of her likely GI bleed and CAD.  Cardiac surgery was consulted to evaluate for possible surgical revascularization.    Past Medical History:   Diagnosis Date    Arthritis     COPD (chronic obstructive pulmonary disease)     Emphysema lung     Hypertension     Osteoporosis      Past Surgical History:   Procedure Laterality Date    CARDIAC CATHETERIZATION N/A 2025    Procedure: Left Heart Cath;  Surgeon: Rinku Azevedo MD;  Location: [facility] CATH INVASIVE LOCATION;  Service: Cardiovascular;  Laterality: N/A;    KNEE ARTHROPLASTY Left     ORTHOPEDIC SURGERY      knee     Family History   Family history unknown: Yes     Social History     Tobacco Use    Smoking status: Former     Current packs/day: 1.00     Average packs/day: 1 pack/day for 25.0 years (25.0 ttl pk-yrs)     Types: Cigarettes    Smokeless tobacco: Never   Vaping Use    Vaping status: Never Used   Substance Use Topics    Alcohol use: Yes     Alcohol/week: 1.0 standard drink of alcohol     Types: 1  Glasses of wine per week     Comment: ocassionally    Drug use: Never     Medications Prior to Admission   Medication Sig Dispense Refill Last Dose/Taking    acetaminophen (TYLENOL) 325 MG tablet Take 2 tablets by mouth Every 4 (Four) Hours As Needed for Mild Pain.       ALPRAZolam (XANAX) 0.25 MG tablet Take 1 tablet by mouth Every 6 (Six) Hours As Needed for Anxiety or Sleep (Agitation) for up to 6 days.   Unknown    aluminum-magnesium hydroxide-simethicone (MAALOX MAX) 400-400-40 MG/5ML suspension Take 15 mL by mouth Every 6 (Six) Hours As Needed for Heartburn.   Unknown    Breo Ellipta 200-25 MCG/ACT inhaler Inhale 1 puff Daily.   Unknown    Cholecalciferol 25 MCG (1000 UT) tablet Take 1 tablet by mouth Daily.   Unknown    docusate sodium (COLACE) 100 MG capsule Take 1 capsule by mouth Daily.   Unknown    ibandronate (BONIVA) 150 MG tablet Take 1 tablet by mouth Every 30 (Thirty) Days.   Unknown    metoprolol succinate XL (TOPROL-XL) 25 MG 24 hr tablet Take 0.5 tablets by mouth Daily. 30 tablet 6 Unknown    morphine 2 MG/ML injection Infuse 1 mL into a venous catheter Every 4 (Four) Hours As Needed for Severe Pain for up to 4 days.   Unknown    naloxone (NARCAN) 0.4 MG/ML injection Infuse 1 mL into a venous catheter Every 5 (Five) Minutes As Needed for Respiratory Depression.   Unknown    nicotine (NICODERM CQ) 21 MG/24HR patch Place 1 patch on the skin as directed by provider Daily. 14 patch 1 Unknown    nitroglycerin (NITROSTAT) 0.4 MG SL tablet Place 1 tablet under the tongue Every 5 (Five) Minutes As Needed for Chest Pain (Systolic BP Greater Than 100). Take no more than 3 doses in 15 minutes. 45 tablet 12 Unknown    ondansetron (ZOFRAN) 2 mg/mL injection Infuse 2 mL into a venous catheter Every 4 (Four) Hours As Needed for Nausea or Vomiting.   Unknown    pantoprazole (PROTONIX) 40 MG injection Infuse 10 mL into a venous catheter Every 12 (Twelve) Hours. Indications: Gastrointestinal (GI) Bleed 600 mL 0  Unknown    ProAir RespiClick 108 (90 Base) MCG/ACT inhaler Inhale 2 puffs Every 6 (Six) Hours As Needed for Wheezing or Shortness of Air.   Unknown    rosuvastatin (CRESTOR) 20 MG tablet Take 1 tablet by mouth Every Night. 90 tablet 3 Unknown    temazepam (RESTORIL) 15 MG capsule Take 1 capsule by mouth At Night As Needed for Sleep for up to 6 days.   Unknown    ticagrelor (BRILINTA) 90 MG tablet tablet Take 1 tablet by mouth 2 (Two) Times a Day. 60 tablet 6 Unknown     Allergies:  Iodine and Lisinopril    Review of Systems   Constitutional:  Positive for fatigue.   Respiratory:  Positive for shortness of breath.    Gastrointestinal:         Melena   All other systems reviewed and are negative.       Objective    Vital Signs  Temp:  [97.9 °F (36.6 °C)-98.6 °F (37 °C)] 98 °F (36.7 °C)  Heart Rate:  [61-88] 68  Resp:  [16-22] 19  BP: ()/(50-68) 100/50  SpO2:  [80 %-100 %] 97 %  on   ;   Device (Oxygen Therapy): room air  Body mass index is 16.94 kg/m².    Physical Exam  Vitals and nursing note reviewed.   Constitutional:       Appearance: She is underweight.   HENT:      Head: Normocephalic and atraumatic.      Right Ear: External ear normal.      Left Ear: External ear normal.      Nose: Nose normal.      Mouth/Throat:      Mouth: Mucous membranes are moist.      Pharynx: Oropharynx is clear.   Eyes:      Extraocular Movements: Extraocular movements intact.      Conjunctiva/sclera: Conjunctivae normal.      Pupils: Pupils are equal, round, and reactive to light.   Cardiovascular:      Rate and Rhythm: Normal rate and regular rhythm.      Pulses: Normal pulses.      Heart sounds: Normal heart sounds.   Pulmonary:      Effort: Pulmonary effort is normal.      Breath sounds: Normal breath sounds.   Abdominal:      General: Abdomen is flat. Bowel sounds are normal.      Palpations: Abdomen is soft.   Musculoskeletal:         General: Normal range of motion.      Cervical back: Normal range of motion and neck  "supple.   Skin:     General: Skin is warm and dry.      Capillary Refill: Capillary refill takes less than 2 seconds.   Neurological:      General: No focal deficit present.      Mental Status: She is alert and oriented to person, place, and time.   Psychiatric:         Mood and Affect: Mood normal.         Behavior: Behavior normal.         Thought Content: Thought content normal.         Results Review:   I reviewed the patient's new clinical results.    WBC WBC   Date Value Ref Range Status   05/15/2025 6.00 3.40 - 10.80 10*3/mm3 Final   05/14/2025 6.50 3.40 - 10.80 10*3/mm3 Final   05/14/2025 6.75 3.40 - 10.80 10*3/mm3 Final   05/13/2025 8.99 3.40 - 10.80 10*3/mm3 Final      HGB Hemoglobin   Date Value Ref Range Status   05/15/2025 9.0 (L) 12.0 - 15.9 g/dL Final   05/14/2025 10.2 (L) 12.0 - 15.9 g/dL Final   05/14/2025 9.9 (L) 12.0 - 15.9 g/dL Final   05/14/2025 9.1 (L) 12.0 - 15.9 g/dL Final   05/14/2025 9.4 (L) 12.0 - 15.9 g/dL Final   05/14/2025 9.6 (L) 12.0 - 15.9 g/dL Final   05/13/2025 6.5 (C) 12.0 - 15.9 g/dL Final      HCT Hematocrit   Date Value Ref Range Status   05/15/2025 29.3 (L) 34.0 - 46.6 % Final   05/14/2025 31.2 (L) 34.0 - 46.6 % Final   05/14/2025 30.2 (L) 34.0 - 46.6 % Final   05/14/2025 27.8 (L) 34.0 - 46.6 % Final   05/14/2025 29.1 (L) 34.0 - 46.6 % Final   05/14/2025 28.9 (L) 34.0 - 46.6 % Final   05/13/2025 21.2 (L) 34.0 - 46.6 % Final      Platelets Platelets   Date Value Ref Range Status   05/15/2025 242 140 - 450 10*3/mm3 Final   05/14/2025 253 140 - 450 10*3/mm3 Final   05/14/2025 239 140 - 450 10*3/mm3 Final   05/13/2025 356 140 - 450 10*3/mm3 Final        PT/INR:  No results found for: \"PROTIME\"/No results found for: \"INR\"    Sodium Sodium   Date Value Ref Range Status   05/15/2025 142 136 - 145 mmol/L Final   05/14/2025 140 136 - 145 mmol/L Final   05/14/2025 140 136 - 145 mmol/L Final   05/13/2025 139 136 - 145 mmol/L Final      Potassium Potassium   Date Value Ref Range Status " "  05/15/2025 3.6 3.5 - 5.2 mmol/L Final   05/14/2025 3.9 3.5 - 5.2 mmol/L Final   05/14/2025 3.9 3.5 - 5.2 mmol/L Final   05/14/2025 3.3 (L) 3.5 - 5.2 mmol/L Final   05/14/2025 3.1 (L) 3.5 - 5.2 mmol/L Final   05/13/2025 3.3 (L) 3.5 - 5.2 mmol/L Final      Chloride Chloride   Date Value Ref Range Status   05/15/2025 113 (H) 98 - 107 mmol/L Final   05/14/2025 112 (H) 98 - 107 mmol/L Final   05/14/2025 112 (H) 98 - 107 mmol/L Final   05/13/2025 107 98 - 107 mmol/L Final      Bicarbonate CO2   Date Value Ref Range Status   05/15/2025 19.6 (L) 22.0 - 29.0 mmol/L Final   05/14/2025 19.0 (L) 22.0 - 29.0 mmol/L Final   05/14/2025 19.8 (L) 22.0 - 29.0 mmol/L Final   05/13/2025 19.6 (L) 22.0 - 29.0 mmol/L Final      BUN BUN   Date Value Ref Range Status   05/15/2025 9 8 - 23 mg/dL Final   05/14/2025 8 8 - 23 mg/dL Final   05/14/2025 9 8 - 23 mg/dL Final   05/13/2025 10 8 - 23 mg/dL Final      Creatinine Creatinine   Date Value Ref Range Status   05/15/2025 0.86 0.57 - 1.00 mg/dL Final   05/14/2025 1.03 (H) 0.57 - 1.00 mg/dL Final   05/14/2025 0.88 0.57 - 1.00 mg/dL Final   05/13/2025 0.89 0.57 - 1.00 mg/dL Final      Calcium Calcium   Date Value Ref Range Status   05/15/2025 8.1 (L) 8.6 - 10.5 mg/dL Final   05/14/2025 8.5 (L) 8.6 - 10.5 mg/dL Final   05/14/2025 7.9 (L) 8.6 - 10.5 mg/dL Final   05/13/2025 9.0 8.6 - 10.5 mg/dL Final      Magnesium No results found for: \"MG\"       Assessment & Plan    Severe multivessel CAD with recent STEMI & JACQUELINE to RCA 4/8/25---aspirin & Brilinta held   Normal LV systolic function---EF 61-65% (echo)  Acute anemia with likely GI bleed----transfused 2 units PRBC 5/13, Protonix gtt, DAPT held  HTN  COPD with former tobacco abuse  Arthritis  Osteoporosis    - Continue to hold dual antiplatelet therapy in setting of likely GI bleed. Gastroenterology has been consulted and plans for EGD tomorrow.   - Dr. Pang to review cath films and make recommendations regarding possible CABG. Will go ahead and " check PFTs, carotid duplex and vein mapping for CABG work-up.   - STS score included below.        Angie Alvarez, APRN  05/15/25  15:55 EDT    Addendum  Patient was seen examined by me, agree with the findings above.  Patient has multivessel coronary artery disease and she is status post ST elevation MI requiring PCI of the RCA and she returns to the emergency room due to anemia, dyspnea and GI bleed.  She is transferred to our service for surgical evaluation and consideration of open revascularization.  Patient is frail, has lung disease and she is presently off Brilinta.  I am not sure she is a surgical candidate given the comorbidities.  The lower extremity venous mapping showed no presence of conduits and she has a longstanding history of smoking and small arteries and I am not sure if her radial arteries are viable conduit..  GI service will see the patient for evaluation.  And to keep the hemoglobin over 9.  Will follow the patient and complete the recommendation regarding possibility of surgery soon  Sandoval Pang MD

## 2025-05-15 NOTE — CONSULTS
Date of Hospital Visit: 05/15/25  Encounter Provider: Rinku Shi MD  Place of Service: [facility] CARDIOLOGY  Patient Name: Yanni Vega  :1945  Referral Provider: Sandoval Pang MD    Chief complaint  Melena, anemia, CAD    History of Present Illness  80-year-old woman with hypertension, inferior STEMI a month ago status post PCI to the RCA who presented to [facility] with shortness of breath and melena.  She was found to be anemic with hemoglobin of 6.5.  She received multiple units of PRBCs and hemoglobin improved to about 9.  DAPT was held, she was placed on Protonix drip and while waiting for EGD she had an episode of chest discomfort.  Given residual severe CAD, she was transferred here for further management of her GI bleed as well as possible revascularization with CABG.        Past Medical History:   Diagnosis Date    Arthritis     COPD (chronic obstructive pulmonary disease)     Emphysema lung     Hypertension     Osteoporosis        Past Surgical History:   Procedure Laterality Date    CARDIAC CATHETERIZATION N/A 2025    Procedure: Left Heart Cath;  Surgeon: Rinku Azevedo MD;  Location: [facility] CATH INVASIVE LOCATION;  Service: Cardiovascular;  Laterality: N/A;    KNEE ARTHROPLASTY Left     ORTHOPEDIC SURGERY      knee       Medications Prior to Admission   Medication Sig Dispense Refill Last Dose/Taking    acetaminophen (TYLENOL) 325 MG tablet Take 2 tablets by mouth Every 4 (Four) Hours As Needed for Mild Pain.       ALPRAZolam (XANAX) 0.25 MG tablet Take 1 tablet by mouth Every 6 (Six) Hours As Needed for Anxiety or Sleep (Agitation) for up to 6 days.   Unknown    aluminum-magnesium hydroxide-simethicone (MAALOX MAX) 400-400-40 MG/5ML suspension Take 15 mL by mouth Every 6 (Six) Hours As Needed for Heartburn.   Unknown    Breo Ellipta 200-25 MCG/ACT inhaler Inhale 1 puff Daily.   Unknown    Cholecalciferol 25 MCG (1000 UT) tablet Take 1 tablet by  mouth Daily.   Unknown    docusate sodium (COLACE) 100 MG capsule Take 1 capsule by mouth Daily.   Unknown    ibandronate (BONIVA) 150 MG tablet Take 1 tablet by mouth Every 30 (Thirty) Days.   Unknown    metoprolol succinate XL (TOPROL-XL) 25 MG 24 hr tablet Take 0.5 tablets by mouth Daily. 30 tablet 6 Unknown    morphine 2 MG/ML injection Infuse 1 mL into a venous catheter Every 4 (Four) Hours As Needed for Severe Pain for up to 4 days.   Unknown    naloxone (NARCAN) 0.4 MG/ML injection Infuse 1 mL into a venous catheter Every 5 (Five) Minutes As Needed for Respiratory Depression.   Unknown    nicotine (NICODERM CQ) 21 MG/24HR patch Place 1 patch on the skin as directed by provider Daily. 14 patch 1 Unknown    nitroglycerin (NITROSTAT) 0.4 MG SL tablet Place 1 tablet under the tongue Every 5 (Five) Minutes As Needed for Chest Pain (Systolic BP Greater Than 100). Take no more than 3 doses in 15 minutes. 45 tablet 12 Unknown    ondansetron (ZOFRAN) 2 mg/mL injection Infuse 2 mL into a venous catheter Every 4 (Four) Hours As Needed for Nausea or Vomiting.   Unknown    pantoprazole (PROTONIX) 40 MG injection Infuse 10 mL into a venous catheter Every 12 (Twelve) Hours. Indications: Gastrointestinal (GI) Bleed 600 mL 0 Unknown    ProAir RespiClick 108 (90 Base) MCG/ACT inhaler Inhale 2 puffs Every 6 (Six) Hours As Needed for Wheezing or Shortness of Air.   Unknown    rosuvastatin (CRESTOR) 20 MG tablet Take 1 tablet by mouth Every Night. 90 tablet 3 Unknown    temazepam (RESTORIL) 15 MG capsule Take 1 capsule by mouth At Night As Needed for Sleep for up to 6 days.   Unknown    ticagrelor (BRILINTA) 90 MG tablet tablet Take 1 tablet by mouth 2 (Two) Times a Day. 60 tablet 6 Unknown       Current Meds  Scheduled Meds:budesonide, 0.5 mg, Nebulization, BID - RT   And  arformoterol, 15 mcg, Nebulization, BID - RT  metoprolol succinate XL, 12.5 mg, Oral, Daily  nicotine, 1 patch, Transdermal, Q24H  rosuvastatin, 20 mg, Oral,  Nightly  sodium chloride, 10 mL, Intravenous, Q12H      Continuous Infusions:pantoprazole, 8 mg/hr, Last Rate: 8 mg/hr (05/15/25 0636)      PRN Meds:.  acetaminophen    albuterol    ALPRAZolam    aluminum-magnesium hydroxide-simethicone    senna-docusate sodium **AND** polyethylene glycol **AND** bisacodyl **AND** bisacodyl    Calcium Replacement - Follow Nurse / BPA Driven Protocol    Magnesium Low Dose Replacement - Follow Nurse / BPA Driven Protocol    morphine    naloxone    nitroglycerin    ondansetron    Phosphorus Replacement - Follow Nurse / BPA Driven Protocol    sodium chloride    sodium chloride    temazepam    Allergies as of 05/14/2025 - Reviewed 05/14/2025   Allergen Reaction Noted    Iodine Anaphylaxis 06/19/2021    Lisinopril Swelling 04/15/2023       Social History     Socioeconomic History    Marital status:    Tobacco Use    Smoking status: Former     Current packs/day: 1.00     Average packs/day: 1 pack/day for 25.0 years (25.0 ttl pk-yrs)     Types: Cigarettes    Smokeless tobacco: Never   Vaping Use    Vaping status: Never Used   Substance and Sexual Activity    Alcohol use: Yes     Alcohol/week: 1.0 standard drink of alcohol     Types: 1 Glasses of wine per week     Comment: ocassionally    Drug use: Never    Sexual activity: Defer       Family History   Family history unknown: Yes       REVIEW OF SYSTEMS:   12 point ROS was performed and is negative except as outlined in HPI          Objective:   Temp:  [97.9 °F (36.6 °C)-98.6 °F (37 °C)] 98.1 °F (36.7 °C)  Heart Rate:  [61-88] 66  Resp:  [16-22] 19  BP: ()/(60-68) 106/60  Body mass index is 16.94 kg/m².  Flowsheet Rows      Flowsheet Row First Filed Value   Admission Height --   Admission Weight 42 kg (92 lb 9.6 oz) Documented at 05/14/2025 1849          Vitals:    05/15/25 0722   BP: 106/60   Pulse: 66   Resp: 19   Temp: 98.1 °F (36.7 °C)   SpO2: 96%       GEN: thin, no distress, alert and oriented  HEENT: NACT, EOMI, moist  mucous membranes  Lungs: CTAB, no wheezes, rales or rhonchi  CV: normal rate, regular rhythm, normal S1, S2, no murmurs, +2 radial pulses b/l, no carotid bruit  Abdomen: soft, nontender, nondistended, NABS  Extremities: no edema  Skin: no rash, warm, dry  Heme/Lymph: no bruising  Psych: organized thought, normal behavior and affect      Lab Review:   Results from last 7 days   Lab Units 05/15/25  0259 05/14/25  1351 05/14/25  0548   SODIUM mmol/L 142   < > 140   POTASSIUM mmol/L 3.6   < > 3.3*   CHLORIDE mmol/L 113*   < > 112*   CO2 mmol/L 19.6*   < > 19.8*   BUN mg/dL 9   < > 9   CREATININE mg/dL 0.86   < > 0.88   CALCIUM mg/dL 8.1*   < > 7.9*   BILIRUBIN mg/dL  --   --  0.6   ALK PHOS U/L  --   --  91   ALT (SGPT) U/L  --   --  11   AST (SGOT) U/L  --   --  19   GLUCOSE mg/dL 89   < > 91    < > = values in this interval not displayed.     Results from last 7 days   Lab Units 05/14/25  0959 05/13/25  1345 05/13/25  1204   HSTROP T ng/L 17* 19* 18*     Results from last 7 days   Lab Units 05/15/25  0259 05/14/25  1943 05/14/25  1532 05/14/25  0738 05/14/25  0548   WBC 10*3/mm3 6.00 6.50  --   --  6.75   HEMOGLOBIN g/dL 9.0* 10.2* 9.9*   < > 9.4*   HEMATOCRIT % 29.3* 31.2* 30.2*   < > 29.1*   PLATELETS 10*3/mm3 242 253  --   --  239    < > = values in this interval not displayed.                 I personally viewed and interpreted the patient's EKG/Telemetry data)      GI bleed    Assessment and Plan:  #Anemia  #Possible GI bleed  #Multivessel CAD with recent STEMI    80-year-old woman with hypertension, inferior STEMI a month ago status post PCI to the RCA who presented to [facility] with shortness of breath and melena.  She was found to be anemic with hemoglobin of 6.5.  She received multiple units of PRBCs and hemoglobin improved to about 9.  DAPT was held, she was placed on Protonix drip and while waiting for EGD she had an episode of chest discomfort.  Given residual severe CAD, she was transferred here for  further management of her GI bleed as well as possible revascularization with CABG.    - Agree with GI consult for consideration of EGD.  She is at intermediate risk for this procedure but can currently clinically stable and can proceed without any further testing or revascularization  -With respect to her CAD, this is a complicated situation.  She has not been able to tolerate DAPT though this was with Brilinta not with a less potent agent such as Plavix.  She appears a bit frail for CABG but surgery will evaluate for this.  Would consider continued medical management of her CAD with a Plavix challenge while inpatient, pending EGD findings.  If you were able to tolerate Plavix instead of Brilinta over the next couple of months, PCI of the other vessels could be considered.    Rinku Stein MD, FACC, [facility]  05/15/25  09:18 EDT.

## 2025-05-16 ENCOUNTER — ANESTHESIA (OUTPATIENT)
Dept: GASTROENTEROLOGY | Facility: HOSPITAL | Age: 80
End: 2025-05-16
Payer: MEDICARE

## 2025-05-16 LAB
ANION GAP SERPL CALCULATED.3IONS-SCNC: 7.7 MMOL/L (ref 5–15)
BASOPHILS # BLD AUTO: 0.02 10*3/MM3 (ref 0–0.2)
BASOPHILS NFR BLD AUTO: 0.3 % (ref 0–1.5)
BUN SERPL-MCNC: 11 MG/DL (ref 8–23)
BUN/CREAT SERPL: 10.7 (ref 7–25)
CALCIUM SPEC-SCNC: 8 MG/DL (ref 8.6–10.5)
CHLORIDE SERPL-SCNC: 114 MMOL/L (ref 98–107)
CO2 SERPL-SCNC: 20.3 MMOL/L (ref 22–29)
CREAT SERPL-MCNC: 1.03 MG/DL (ref 0.57–1)
DEPRECATED RDW RBC AUTO: 50.3 FL (ref 37–54)
EGFRCR SERPLBLD CKD-EPI 2021: 54.7 ML/MIN/1.73
EOSINOPHIL # BLD AUTO: 0.22 10*3/MM3 (ref 0–0.4)
EOSINOPHIL NFR BLD AUTO: 3.7 % (ref 0.3–6.2)
ERYTHROCYTE [DISTWIDTH] IN BLOOD BY AUTOMATED COUNT: 14.4 % (ref 12.3–15.4)
GLUCOSE SERPL-MCNC: 91 MG/DL (ref 65–99)
HCT VFR BLD AUTO: 27.9 % (ref 34–46.6)
HGB BLD-MCNC: 8.8 G/DL (ref 12–15.9)
IMM GRANULOCYTES # BLD AUTO: 0.02 10*3/MM3 (ref 0–0.05)
IMM GRANULOCYTES NFR BLD AUTO: 0.3 % (ref 0–0.5)
LYMPHOCYTES # BLD AUTO: 1.13 10*3/MM3 (ref 0.7–3.1)
LYMPHOCYTES NFR BLD AUTO: 19.3 % (ref 19.6–45.3)
MCH RBC QN AUTO: 29.8 PG (ref 26.6–33)
MCHC RBC AUTO-ENTMCNC: 31.5 G/DL (ref 31.5–35.7)
MCV RBC AUTO: 94.6 FL (ref 79–97)
MONOCYTES # BLD AUTO: 0.83 10*3/MM3 (ref 0.1–0.9)
MONOCYTES NFR BLD AUTO: 14.1 % (ref 5–12)
NEUTROPHILS NFR BLD AUTO: 3.65 10*3/MM3 (ref 1.7–7)
NEUTROPHILS NFR BLD AUTO: 62.3 % (ref 42.7–76)
NRBC BLD AUTO-RTO: 0 /100 WBC (ref 0–0.2)
PLATELET # BLD AUTO: 229 10*3/MM3 (ref 140–450)
PMV BLD AUTO: 10.5 FL (ref 6–12)
POTASSIUM SERPL-SCNC: 3.6 MMOL/L (ref 3.5–5.2)
RBC # BLD AUTO: 2.95 10*6/MM3 (ref 3.77–5.28)
SODIUM SERPL-SCNC: 142 MMOL/L (ref 136–145)
WBC NRBC COR # BLD AUTO: 5.87 10*3/MM3 (ref 3.4–10.8)

## 2025-05-16 PROCEDURE — 0DJ08ZZ INSPECTION OF UPPER INTESTINAL TRACT, VIA NATURAL OR ARTIFICIAL OPENING ENDOSCOPIC: ICD-10-PCS | Performed by: INTERNAL MEDICINE

## 2025-05-16 PROCEDURE — 80048 BASIC METABOLIC PNL TOTAL CA: CPT | Performed by: NURSE PRACTITIONER

## 2025-05-16 PROCEDURE — 25010000002 PHENYLEPHRINE 10 MG/ML SOLUTION: Performed by: ANESTHESIOLOGY

## 2025-05-16 PROCEDURE — 94799 UNLISTED PULMONARY SVC/PX: CPT

## 2025-05-16 PROCEDURE — 25010000002 PROPOFOL 10 MG/ML EMULSION: Performed by: ANESTHESIOLOGY

## 2025-05-16 PROCEDURE — 99232 SBSQ HOSP IP/OBS MODERATE 35: CPT | Performed by: STUDENT IN AN ORGANIZED HEALTH CARE EDUCATION/TRAINING PROGRAM

## 2025-05-16 PROCEDURE — 25810000003 LACTATED RINGERS PER 1000 ML: Performed by: ANESTHESIOLOGY

## 2025-05-16 PROCEDURE — 85025 COMPLETE CBC W/AUTO DIFF WBC: CPT | Performed by: NURSE PRACTITIONER

## 2025-05-16 PROCEDURE — 25010000002 LIDOCAINE 2% SOLUTION: Performed by: ANESTHESIOLOGY

## 2025-05-16 PROCEDURE — 94761 N-INVAS EAR/PLS OXIMETRY MLT: CPT

## 2025-05-16 PROCEDURE — 25010000002 PROPOFOL 1000 MG/100ML EMULSION: Performed by: ANESTHESIOLOGY

## 2025-05-16 RX ORDER — SODIUM CHLORIDE, SODIUM LACTATE, POTASSIUM CHLORIDE, CALCIUM CHLORIDE 600; 310; 30; 20 MG/100ML; MG/100ML; MG/100ML; MG/100ML
INJECTION, SOLUTION INTRAVENOUS CONTINUOUS PRN
Status: DISCONTINUED | OUTPATIENT
Start: 2025-05-16 | End: 2025-05-16 | Stop reason: SURG

## 2025-05-16 RX ORDER — PROPOFOL 10 MG/ML
INJECTION, EMULSION INTRAVENOUS AS NEEDED
Status: DISCONTINUED | OUTPATIENT
Start: 2025-05-16 | End: 2025-05-16 | Stop reason: SURG

## 2025-05-16 RX ORDER — POLYETHYLENE GLYCOL 3350 17 G/17G
0.5 POWDER, FOR SOLUTION ORAL EVERY 12 HOURS
Status: COMPLETED | OUTPATIENT
Start: 2025-05-16 | End: 2025-05-17

## 2025-05-16 RX ORDER — BISACODYL 5 MG/1
15 TABLET, DELAYED RELEASE ORAL ONCE
Status: COMPLETED | OUTPATIENT
Start: 2025-05-16 | End: 2025-05-16

## 2025-05-16 RX ORDER — PHENYLEPHRINE HYDROCHLORIDE 10 MG/ML
INJECTION INTRAVENOUS AS NEEDED
Status: DISCONTINUED | OUTPATIENT
Start: 2025-05-16 | End: 2025-05-16 | Stop reason: SURG

## 2025-05-16 RX ORDER — LIDOCAINE HYDROCHLORIDE 20 MG/ML
INJECTION, SOLUTION INFILTRATION; PERINEURAL AS NEEDED
Status: DISCONTINUED | OUTPATIENT
Start: 2025-05-16 | End: 2025-05-16 | Stop reason: SURG

## 2025-05-16 RX ADMIN — BUDESONIDE 0.5 MG: 0.5 INHALANT RESPIRATORY (INHALATION) at 22:01

## 2025-05-16 RX ADMIN — METOPROLOL SUCCINATE 12.5 MG: 25 TABLET, EXTENDED RELEASE ORAL at 13:02

## 2025-05-16 RX ADMIN — BUDESONIDE 0.5 MG: 0.5 INHALANT RESPIRATORY (INHALATION) at 07:07

## 2025-05-16 RX ADMIN — POLYETHYLENE GLYCOL 3350 0.5 BOTTLE: 17 POWDER, FOR SOLUTION ORAL at 15:47

## 2025-05-16 RX ADMIN — ARFORMOTEROL TARTRATE 15 MCG: 15 SOLUTION RESPIRATORY (INHALATION) at 07:07

## 2025-05-16 RX ADMIN — PROPOFOL INJECTABLE EMULSION 20 MG: 10 INJECTION, EMULSION INTRAVENOUS at 10:51

## 2025-05-16 RX ADMIN — PROPOFOL 200 MCG/KG/MIN: 10 INJECTION, EMULSION INTRAVENOUS at 10:49

## 2025-05-16 RX ADMIN — BISACODYL 15 MG: 5 TABLET, COATED ORAL at 13:07

## 2025-05-16 RX ADMIN — PANTOPRAZOLE SODIUM 8 MG/HR: 40 INJECTION, POWDER, FOR SOLUTION INTRAVENOUS at 04:22

## 2025-05-16 RX ADMIN — PHENYLEPHRINE HYDROCHLORIDE 100 MCG: 10 INJECTION INTRAVENOUS at 10:56

## 2025-05-16 RX ADMIN — PROPOFOL INJECTABLE EMULSION 50 MG: 10 INJECTION, EMULSION INTRAVENOUS at 10:49

## 2025-05-16 RX ADMIN — PHENYLEPHRINE HYDROCHLORIDE 50 MCG: 10 INJECTION INTRAVENOUS at 10:52

## 2025-05-16 RX ADMIN — Medication 10 ML: at 21:11

## 2025-05-16 RX ADMIN — LIDOCAINE HYDROCHLORIDE 30 MG: 20 INJECTION, SOLUTION INFILTRATION; PERINEURAL at 10:49

## 2025-05-16 RX ADMIN — SODIUM CHLORIDE, POTASSIUM CHLORIDE, SODIUM LACTATE AND CALCIUM CHLORIDE: 600; 310; 30; 20 INJECTION, SOLUTION INTRAVENOUS at 10:43

## 2025-05-16 RX ADMIN — ALBUTEROL SULFATE 2.5 MG: 2.5 SOLUTION RESPIRATORY (INHALATION) at 12:14

## 2025-05-16 RX ADMIN — PHENYLEPHRINE HYDROCHLORIDE 50 MCG: 10 INJECTION INTRAVENOUS at 10:49

## 2025-05-16 RX ADMIN — ARFORMOTEROL TARTRATE 15 MCG: 15 SOLUTION RESPIRATORY (INHALATION) at 21:59

## 2025-05-16 NOTE — PLAN OF CARE
Goal Outcome Evaluation:  Plan of Care Reviewed With: patient        Progress: no change  Outcome Evaluation: Pt currently NPO for EGD procedure. Consent in chart. Protonix drip infusing. VSS, lab results reviewed. Up to bathroom with staff assistance. Will update POC as needed

## 2025-05-16 NOTE — PROGRESS NOTES
LOS: 2 days   Patient Care Team:  Carmen Ayon MD as PCP - General (Internal Medicine)    Chief Complaint: Multivessel CAD, melena      Subjective: Patient resting comfortably in bed.  EGD this a.m..  Denies chest pain.    Vital Signs  Temp:  [97.9 °F (36.6 °C)-98.6 °F (37 °C)] 98 °F (36.7 °C)  Heart Rate:  [55-78] 63  Resp:  [18-20] 18  BP: (100-112)/(50-61) 108/57      05/14/25  1849   Weight: 42 kg (92 lb 9.6 oz)     Body mass index is 16.94 kg/m².    Intake/Output Summary (Last 24 hours) at 5/16/2025 0844  Last data filed at 5/16/2025 0310  Gross per 24 hour   Intake 360 ml   Output 700 ml   Net -340 ml     No intake/output data recorded.      Objective:  Vital signs: (most recent): Blood pressure 131/59, pulse 90, temperature 97.9 °F (36.6 °C), temperature source Oral, resp. rate 18, weight 42 kg (92 lb 9.6 oz), SpO2 95%.              Physical Examination:   General appearance - alert, well appearing, and in no distress  Mental status - normal mood, behavior, speech, dress, motor activity, and thought processes  Chest - clear to auscultation, no wheezes, rales or rhonchi, symmetric air entry  Heart - normal rate, regular rhythm, normal S1, S2, no murmurs, rubs, clicks or gallops  Abdomen - soft, nontender, nondistended, no masses or organomegaly  bowel sounds normal  Neurological - alert, oriented, normal speech, no focal findings or movement disorder noted, motor and sensory grossly normal bilaterally  Extremities - peripheral pulses normal, no pedal edema, no clubbing or cyanosis  Skin - normal coloration and turgor, no rashes, no suspicious skin lesions noted     Results Review:      WBC WBC   Date Value Ref Range Status   05/16/2025 5.87 3.40 - 10.80 10*3/mm3 Final   05/15/2025 6.00 3.40 - 10.80 10*3/mm3 Final   05/14/2025 6.50 3.40 - 10.80 10*3/mm3 Final   05/14/2025 6.75 3.40 - 10.80 10*3/mm3 Final   05/13/2025 8.99 3.40 - 10.80 10*3/mm3 Final      HGB Hemoglobin   Date Value Ref Range Status  "  05/16/2025 8.8 (L) 12.0 - 15.9 g/dL Final   05/15/2025 9.0 (L) 12.0 - 15.9 g/dL Final   05/14/2025 10.2 (L) 12.0 - 15.9 g/dL Final   05/14/2025 9.9 (L) 12.0 - 15.9 g/dL Final   05/14/2025 9.1 (L) 12.0 - 15.9 g/dL Final   05/14/2025 9.4 (L) 12.0 - 15.9 g/dL Final   05/14/2025 9.6 (L) 12.0 - 15.9 g/dL Final   05/13/2025 6.5 (C) 12.0 - 15.9 g/dL Final      HCT Hematocrit   Date Value Ref Range Status   05/16/2025 27.9 (L) 34.0 - 46.6 % Final   05/15/2025 29.3 (L) 34.0 - 46.6 % Final   05/14/2025 31.2 (L) 34.0 - 46.6 % Final   05/14/2025 30.2 (L) 34.0 - 46.6 % Final   05/14/2025 27.8 (L) 34.0 - 46.6 % Final   05/14/2025 29.1 (L) 34.0 - 46.6 % Final   05/14/2025 28.9 (L) 34.0 - 46.6 % Final   05/13/2025 21.2 (L) 34.0 - 46.6 % Final      Platelets Platelets   Date Value Ref Range Status   05/16/2025 229 140 - 450 10*3/mm3 Final   05/15/2025 242 140 - 450 10*3/mm3 Final   05/14/2025 253 140 - 450 10*3/mm3 Final   05/14/2025 239 140 - 450 10*3/mm3 Final   05/13/2025 356 140 - 450 10*3/mm3 Final        PT/INR:  No results found for: \"PROTIME\"/No results found for: \"INR\"    Sodium Sodium   Date Value Ref Range Status   05/16/2025 142 136 - 145 mmol/L Final   05/15/2025 142 136 - 145 mmol/L Final   05/14/2025 140 136 - 145 mmol/L Final   05/14/2025 140 136 - 145 mmol/L Final   05/13/2025 139 136 - 145 mmol/L Final      Potassium Potassium   Date Value Ref Range Status   05/16/2025 3.6 3.5 - 5.2 mmol/L Final   05/15/2025 3.6 3.5 - 5.2 mmol/L Final   05/14/2025 3.9 3.5 - 5.2 mmol/L Final   05/14/2025 3.9 3.5 - 5.2 mmol/L Final   05/14/2025 3.3 (L) 3.5 - 5.2 mmol/L Final   05/14/2025 3.1 (L) 3.5 - 5.2 mmol/L Final   05/13/2025 3.3 (L) 3.5 - 5.2 mmol/L Final      Chloride Chloride   Date Value Ref Range Status   05/16/2025 114 (H) 98 - 107 mmol/L Final   05/15/2025 113 (H) 98 - 107 mmol/L Final   05/14/2025 112 (H) 98 - 107 mmol/L Final   05/14/2025 112 (H) 98 - 107 mmol/L Final   05/13/2025 107 98 - 107 mmol/L Final    " "  Bicarbonate CO2   Date Value Ref Range Status   05/16/2025 20.3 (L) 22.0 - 29.0 mmol/L Final   05/15/2025 19.6 (L) 22.0 - 29.0 mmol/L Final   05/14/2025 19.0 (L) 22.0 - 29.0 mmol/L Final   05/14/2025 19.8 (L) 22.0 - 29.0 mmol/L Final   05/13/2025 19.6 (L) 22.0 - 29.0 mmol/L Final      BUN BUN   Date Value Ref Range Status   05/16/2025 11 8 - 23 mg/dL Final   05/15/2025 9 8 - 23 mg/dL Final   05/14/2025 8 8 - 23 mg/dL Final   05/14/2025 9 8 - 23 mg/dL Final   05/13/2025 10 8 - 23 mg/dL Final      Creatinine Creatinine   Date Value Ref Range Status   05/16/2025 1.03 (H) 0.57 - 1.00 mg/dL Final   05/15/2025 0.86 0.57 - 1.00 mg/dL Final   05/14/2025 1.03 (H) 0.57 - 1.00 mg/dL Final   05/14/2025 0.88 0.57 - 1.00 mg/dL Final   05/13/2025 0.89 0.57 - 1.00 mg/dL Final      Calcium Calcium   Date Value Ref Range Status   05/16/2025 8.0 (L) 8.6 - 10.5 mg/dL Final   05/15/2025 8.1 (L) 8.6 - 10.5 mg/dL Final   05/14/2025 8.5 (L) 8.6 - 10.5 mg/dL Final   05/14/2025 7.9 (L) 8.6 - 10.5 mg/dL Final   05/13/2025 9.0 8.6 - 10.5 mg/dL Final      Magnesium No results found for: \"MG\"       budesonide, 0.5 mg, Nebulization, BID - RT   And  arformoterol, 15 mcg, Nebulization, BID - RT  metoprolol succinate XL, 12.5 mg, Oral, Daily  nicotine, 1 patch, Transdermal, Q24H  rosuvastatin, 20 mg, Oral, Nightly  sodium chloride, 10 mL, Intravenous, Q12H      pantoprazole, 8 mg/hr, Last Rate: 8 mg/hr (05/16/25 6036)          GI bleed      Assessment & Plan    Severe multivessel CAD with recent STEMI & JACQUELINE to RCA 4/8/25---aspirin & Brilinta held   Normal LV systolic function---EF 61-65% (echo)  Acute anemia with likely GI bleed----transfused 2 units PRBC 5/13, Protonix gtt, DAPT held  HTN  COPD with former tobacco abuse  Arthritis  Osteoporosis      - Doing well.  No chest pain.  On room air.  - EGD today.  No significant findings other than small hiatal hernia.  Plans for colonoscopy tomorrow per GI.  - Vein mapping without available conduit. "   - At this time, Dr. Pang does not recommend CABG given advanced age and multiple co-morbidities. Patient is also not eager to go forward with surgery either.   - Defer initiation of anti-platelet therapy to cardiology.         Angie Alvarez, APRN  05/16/25  08:44 EDT  Addendum  Patient was seen and examined by me, agree with the findings above.  Due to frailty, active GI process and other comorbidities and the lack of venous conduit, recommend medical treatment over surgery  Sandoval Pang MD

## 2025-05-16 NOTE — PROGRESS NOTES
Patient Name: Yanni Vega  :1945  80 y.o.      Patient Care Team:  Carmen Ayon MD as PCP - General (Internal Medicine)    Chief Complaint:   Anemia, CAD    Interval History:   EGD normal, no further episodes of chest pain.     Objective   Vital Signs  Temp:  [97.9 °F (36.6 °C)-98.6 °F (37 °C)] 97.9 °F (36.6 °C)  Heart Rate:  [55-90] 90  Resp:  [14-20] 18  BP: ()/(50-88) 131/59    Intake/Output Summary (Last 24 hours) at 2025 1530  Last data filed at 2025 1413  Gross per 24 hour   Intake 250 ml   Output 900 ml   Net -650 ml     Flowsheet Rows      Flowsheet Row First Filed Value   Admission Height --   Admission Weight 42 kg (92 lb 9.6 oz) Documented at 2025 1849            GEN: no distress, alert and oriented  HEENT: NACT, EOMI, moist mucous membranes  Lungs: CTAB, no wheezes, rales or rhonchi  CV: normal rate, regular rhythm, normal S1, S2, no murmurs, +2 radial pulses b/l, no carotid bruit  Abdomen: soft, nontender, nondistended, NABS  Extremities: no edema  Skin: no rash, warm, dry  Heme/Lymph: no bruising  Psych: organized thought, normal behavior and affect    Results Review:    Results from last 7 days   Lab Units 25  0345   SODIUM mmol/L 142   POTASSIUM mmol/L 3.6   CHLORIDE mmol/L 114*   CO2 mmol/L 20.3*   BUN mg/dL 11   CREATININE mg/dL 1.03*   GLUCOSE mg/dL 91   CALCIUM mg/dL 8.0*     Results from last 7 days   Lab Units 25  0959 25  1345 25  1204   HSTROP T ng/L 17* 19* 18*     Results from last 7 days   Lab Units 25  0345   WBC 10*3/mm3 5.87   HEMOGLOBIN g/dL 8.8*   HEMATOCRIT % 27.9*   PLATELETS 10*3/mm3 229             Results from last 7 days   Lab Units 25  1351   CHOLESTEROL mg/dL 101   TRIGLYCERIDES mg/dL 55   HDL CHOL mg/dL 57   LDL CHOL mg/dL 31               Medication Review:   budesonide, 0.5 mg, Nebulization, BID - RT   And  arformoterol, 15 mcg, Nebulization, BID - RT  metoprolol succinate XL, 12.5 mg, Oral,  Daily  nicotine, 1 patch, Transdermal, Q24H  polyethylene glycol, 0.5 bottle, Oral, Q12H  rosuvastatin, 20 mg, Oral, Nightly  sodium chloride, 10 mL, Intravenous, Q12H              Assessment & Plan   #Anemia  #Possible GI bleed  #Multivessel CAD with recent STEMI     80-year-old woman with hypertension, inferior STEMI a month ago status post PCI to the RCA who presented to [facility] with shortness of breath and melena.  She was found to be anemic with hemoglobin of 6.5.  She received multiple units of PRBCs and hemoglobin improved to about 9.  DAPT was held, she was placed on Protonix drip and while waiting for EGD she had an episode of chest discomfort.  Given residual severe CAD, she was transferred here for further management of her GI bleed as well as possible revascularization with CABG.     EGD today was normal.  Plan is for colonoscopy    -With respect to her CAD, this is a complicated situation.  She has not been able to tolerate DAPT though this was with Brilinta not with a less potent agent such as Plavix. Does not have great veins for surgery and likely to frail for radial or bilateral IMAs.  Would pursue continued medical management of her CAD with a Plavix challenge while inpatient, pending colonoscopy findings.  If she were able to tolerate Plavix instead of Brilinta over the next couple of months, PCI of the other vessels could be considered.    Rinku Stein MD, FACC, [facility]  05/16/25  15:30 EDT

## 2025-05-16 NOTE — PROGRESS NOTES
"Nutrition Services    Patient Name: Yanni Vega  YOB: 1945  MRN: [id number]  Admission date: 5/14/2025    Assessment Date:  05/16/25    NUTRITION EVALUATION      Reason for Encounter BMI   Diagnosis/Problem Admission Diagnosis:  GI bleed [K92.2]    Problem List:    GI bleed     Narrative 80 yoF admitted with likely GI bleed. EGD done today.        PO Diet Diet: Liquid; Clear Liquid; Fluid Consistency: Thin (IDDSI 0)  NPO Diet NPO Type: Strict NPO   Allergies NKFA   Supplements n/a   PO Intake % clears       Chewing/Swallowing Difficulty no issues identified at this time       Medications reviewed   Labs  reviewed       Physical Findings alert, oriented     Edema no edema    GI Function last bowel movement: 5/15   Skin Status skin intact    Lines/Drains none   I/O reviewed        Height  Weight  BMI  Weight Trend      62\"  Weight: 42 kg (92 lb 9.6 oz) (05/14/25 1849)  Body mass index is 16.94 kg/m².  Stable    Weight change: No significant changes       NFPE Pending, due to: RD visit        Nutrition Problem (PES) Problem: Inadequate Oral Intake  Etiology: Medical Diagnosis - GI bleed    Signs/Symptoms: Clear Liquid Diet       Intervention/Plan Boost breeze TID  ADAT per MD    RD to follow up per protocol.     Results from last 7 days   Lab Units 05/16/25  0345 05/15/25  0259 05/14/25  1943 05/14/25  1351 05/14/25  0548 05/14/25  0041 05/13/25  1204   SODIUM mmol/L 142 142 140  --  140  --  139   POTASSIUM mmol/L 3.6 3.6 3.9   < > 3.3*   < > 3.3*   CHLORIDE mmol/L 114* 113* 112*  --  112*  --  107   CO2 mmol/L 20.3* 19.6* 19.0*  --  19.8*  --  19.6*   BUN mg/dL 11 9 8  --  9  --  10   CREATININE mg/dL 1.03* 0.86 1.03*  --  0.88  --  0.89   CALCIUM mg/dL 8.0* 8.1* 8.5*  --  7.9*  --  9.0   BILIRUBIN mg/dL  --   --   --   --  0.6  --  0.5   ALK PHOS U/L  --   --   --   --  91  --  109   ALT (SGPT) U/L  --   --   --   --  11  --  13   AST (SGOT) U/L  --   --   --   --  19  --  23   GLUCOSE mg/dL 91 89 " 81  --  91  --  97    < > = values in this interval not displayed.     Results from last 7 days   Lab Units 05/16/25  0345 05/14/25  1532 05/14/25  1351   HEMOGLOBIN g/dL 8.8*   < >  --    HEMATOCRIT % 27.9*   < >  --    TRIGLYCERIDES mg/dL  --   --  55    < > = values in this interval not displayed.     Lab Results   Component Value Date    HGBA1C 5.60 04/08/2025     Wt Readings from Last 10 Encounters:   05/14/25 42 kg (92 lb 9.6 oz)   05/14/25 41.5 kg (91 lb 7.9 oz)   04/28/25 42.2 kg (93 lb)   04/08/25 42.4 kg (93 lb 7.6 oz)   04/15/23 43.1 kg (95 lb)   08/04/22 43.9 kg (96 lb 12.8 oz)   06/26/21 44.9 kg (99 lb)   06/19/21 45.1 kg (99 lb 8 oz)       Electronically signed by:  Jackeline Espinoza RD  05/16/25 15:27 EDT

## 2025-05-16 NOTE — ANESTHESIA PREPROCEDURE EVALUATION
Anesthesia Evaluation     Patient summary reviewed   no history of anesthetic complications:   NPO Solid Status: > 8 hours  NPO Liquid Status: > 2 hours           Airway   Mallampati: II  TM distance: >3 FB  Neck ROM: full  No difficulty expected  Dental      Pulmonary     breath sounds clear to auscultation  (+) a smoker Former, COPD,  (-) recent URI  Cardiovascular     ECG reviewed  Patient on routine beta blocker  Rhythm: regular  Rate: normal    (+) hypertension, past MI  <3 months, CAD (PCI/ JACQUELINE to RCA 1ma, residual severe CAD), cardiac stents (1mo ago, RCA) Drug eluting stent within the past 12 months   (-) dysrhythmias, anginaCABG: pending.    ROS comment:   ECHO (4/2025): Interpretation Summary  ·  Left ventricular ejection fraction appears to be 61 - 65%.  ·  Left ventricular wall thickness is consistent with mild to moderate concentric hypertrophy.  ·  Left ventricular diastolic function is consistent with (grade I) impaired relaxation.  ·  The left atrial cavity is mildly dilated.  ·  There are myxomatous changes of the mitral valve apparatus present.  ·  Estimated right ventricular systolic pressure from tricuspid regurgitation is moderately elevated (45-55 mmHg).      Neuro/Psych  (-) seizures, CVA  GI/Hepatic/Renal/Endo    (+) PUD, GI bleeding upper active bleeding  (-)  obesityRenal disease: Cr 1.03.    Musculoskeletal     Abdominal    Substance History      OB/GYN          Other   arthritis, blood dyscrasia (multiple units of PRBCs and hemoglobin improved to 8.8 from 6.5) anemia,     ROS/Med Hx Other: residual severe CAD, she was transferred here for further management of her GI bleed as well as possible revascularization with CABG.                      Anesthesia Plan    ASA 4     MAC     (MAC anesthesia discussed with patient and/or patient representative. Risks (including but not limited to intra-op awareness), benefits, and alternatives were discussed. Understanding was voiced with an agreement  to proceed with a MAC technique and General as a backup option. )    Anesthetic plan, risks, benefits, and alternatives have been provided, discussed and informed consent has been obtained with: patient.        CODE STATUS:    Code Status (Patient has no pulse and is not breathing): CPR (Attempt to Resuscitate)  Medical Interventions (Patient has pulse or is breathing): Full Support

## 2025-05-16 NOTE — CASE MANAGEMENT/SOCIAL WORK
Discharge Planning Assessment  [facility]     Patient Name: Yanni Vega  MRN: [id number]  Today's Date: 5/16/2025    Admit Date: 5/14/2025    Plan: Home; Denies current needs.   Discharge Needs Assessment       Row Name 05/16/25 1244       Living Environment    People in Home alone    Current Living Arrangements home    Potentially Unsafe Housing Conditions none    In the past 12 months has the electric, gas, oil, or water company threatened to shut off services in your home? No    Primary Care Provided by self    Provides Primary Care For pet(s)  4 CATS & 2 DOGS    Family Caregiver if Needed grandchild(georgina), adult;friend(s)    Family Caregiver Names Alivia Fisher/granddaughter; Elsi Vega/granddaughter; Latrice Pinto/friend    Quality of Family Relationships helpful;involved;supportive    Able to Return to Prior Arrangements yes       Resource/Environmental Concerns    Resource/Environmental Concerns none    Transportation Concerns other (see comments)  Has not driven since heart attack on April 18       Transportation Needs    In the past 12 months, has lack of transportation kept you from medical appointments or from getting medications? no    In the past 12 months, has lack of transportation kept you from meetings, work, or from getting things needed for daily living? No       Food Insecurity    Within the past 12 months, you worried that your food would run out before you got the money to buy more. Never true    Within the past 12 months, the food you bought just didn't last and you didn't have money to get more. Never true       Transition Planning    Patient/Family Anticipates Transition to home    Patient/Family Anticipated Services at Transition none    Transportation Anticipated family or friend will provide       Discharge Needs Assessment    Readmission Within the Last 30 Days planned readmission    Equipment Currently Used at Home scales;other (see comments)  has a rolling walker she  "does not use    Concerns to be Addressed no discharge needs identified;denies needs/concerns at this time    Do you want help finding or keeping work or a job? I do not need or want help    Do you want help with school or training? For example, starting or completing job training or getting a high school diploma, GED or equivalent No    Anticipated Changes Related to Illness none    Equipment Needed After Discharge none    Provided Post Acute Provider List? Refused    Provided Post Acute Provider Quality & Resource List? Refused    Current Discharge Risk lives alone                   Discharge Plan       Row Name 05/16/25 1246       Plan    Plan Home; Denies current needs.    Plan Comments CCP spoke with pleasant patient at bedside.  Explained role, verified face sheet, and discussed discharge plan.  Patient stated she is IADL's, retired and has not driven her car since her heart attack on 4/18/25.  Patient lives alone in a two-story home with three entrance steps.  Pt reports her bedroom/bath is on mail level of home.  Pt reports the only DME that she has is a scale and rolling walker that she does not use (only used it after a knee operation).  Patients PCP confirmed as, Carmen Ayon and home pharmacy is [facility].  Patient reports her granddaughter picks up her groceries and medications for her.  Patient denies use of past home health or going to a sub-acute rehab.  Patient plans to return home at discharge and reports if she needs assistance her friend Latrice Pinto \"who is actually a distant relative\" will help.  Patient's POA is her granddaughter Alivia Fisher [phone].  Patient denies current discharge needs.  CCP will continue to follow….…Elena HAYES /CHARLES.                    Expected Discharge Date and Time       Expected Discharge Date Expected Discharge Time    May 17, 2025            Demographic Summary       Row Name 05/16/25 1247       General Information    Admission Type inpatient    " Arrived From hospital  [facility]    Required Notices Provided Important Message from Medicare    Referral Source admission list    Reason for Consult discharge planning    Preferred Language English       Contact Information    Permission Granted to Share Info With ;family/designee    Contact Information Comments POA/GRANDDAUGHTER- JORGE LUIS SHOOK [phone]                   Functional Status       Row Name 05/16/25 1242       Functional Status    Usual Activity Tolerance good    Current Activity Tolerance good       Physical Activity    On average, how many days per week do you engage in moderate to strenuous exercise (like a brisk walk)? 0 days    On average, how many minutes do you engage in exercise at this level? 0 min    Number of minutes of exercise per week 0       Assessment of Health Literacy    How often do you have someone help you read hospital materials? Never    How often do you have problems learning about your medical condition because of difficulty understanding written information? Never    How often do you have a problem understanding what is told to you about your medical condition? Never    How confident are you filling out medical forms by yourself? Quite a bit    Health Literacy Good       Functional Status, IADL    Medications independent    Meal Preparation independent    Housekeeping independent    Laundry independent    Shopping assistive person    If for any reason you need help with day-to-day activities such as bathing, preparing meals, shopping, managing finances, etc., do you get the help you need? I get all the help I need       Mental Status    General Appearance WDL WDL       Mental Status Summary    Recent Changes in Mental Status/Cognitive Functioning no changes       Employment/    Employment Status retired                   Psychosocial    No documentation.                  Abuse/Neglect    No documentation.                  Legal    No  documentation.                  Substance Abuse    No documentation.                  Patient Forms    No documentation.                     Elena Delgado RN

## 2025-05-17 ENCOUNTER — ANESTHESIA EVENT (OUTPATIENT)
Dept: GASTROENTEROLOGY | Facility: HOSPITAL | Age: 80
End: 2025-05-17
Payer: MEDICARE

## 2025-05-17 ENCOUNTER — ANESTHESIA (OUTPATIENT)
Dept: GASTROENTEROLOGY | Facility: HOSPITAL | Age: 80
End: 2025-05-17
Payer: MEDICARE

## 2025-05-17 LAB
ANION GAP SERPL CALCULATED.3IONS-SCNC: 9.2 MMOL/L (ref 5–15)
BASOPHILS # BLD AUTO: 0.03 10*3/MM3 (ref 0–0.2)
BASOPHILS NFR BLD AUTO: 0.5 % (ref 0–1.5)
BUN SERPL-MCNC: 6 MG/DL (ref 8–23)
BUN/CREAT SERPL: 7.9 (ref 7–25)
CALCIUM SPEC-SCNC: 7.9 MG/DL (ref 8.6–10.5)
CHLORIDE SERPL-SCNC: 115 MMOL/L (ref 98–107)
CLOSE TME COLL+ADP + EPINEP PNL BLD: 46 % (ref 86–100)
CO2 SERPL-SCNC: 18.8 MMOL/L (ref 22–29)
CREAT SERPL-MCNC: 0.76 MG/DL (ref 0.57–1)
DEPRECATED RDW RBC AUTO: 54 FL (ref 37–54)
EGFRCR SERPLBLD CKD-EPI 2021: 78.8 ML/MIN/1.73
EOSINOPHIL # BLD AUTO: 0.3 10*3/MM3 (ref 0–0.4)
EOSINOPHIL NFR BLD AUTO: 4.7 % (ref 0.3–6.2)
ERYTHROCYTE [DISTWIDTH] IN BLOOD BY AUTOMATED COUNT: 14.6 % (ref 12.3–15.4)
GLUCOSE SERPL-MCNC: 87 MG/DL (ref 65–99)
HCT VFR BLD AUTO: 29.6 % (ref 34–46.6)
HGB BLD-MCNC: 8.8 G/DL (ref 12–15.9)
IMM GRANULOCYTES # BLD AUTO: 0.02 10*3/MM3 (ref 0–0.05)
IMM GRANULOCYTES NFR BLD AUTO: 0.3 % (ref 0–0.5)
INR PPP: 1.14 (ref 0.9–1.1)
LYMPHOCYTES # BLD AUTO: 1.26 10*3/MM3 (ref 0.7–3.1)
LYMPHOCYTES NFR BLD AUTO: 19.7 % (ref 19.6–45.3)
MCH RBC QN AUTO: 29.7 PG (ref 26.6–33)
MCHC RBC AUTO-ENTMCNC: 29.7 G/DL (ref 31.5–35.7)
MCV RBC AUTO: 100 FL (ref 79–97)
MONOCYTES # BLD AUTO: 0.71 10*3/MM3 (ref 0.1–0.9)
MONOCYTES NFR BLD AUTO: 11.1 % (ref 5–12)
NEUTROPHILS NFR BLD AUTO: 4.09 10*3/MM3 (ref 1.7–7)
NEUTROPHILS NFR BLD AUTO: 63.7 % (ref 42.7–76)
NRBC BLD AUTO-RTO: 0 /100 WBC (ref 0–0.2)
PLATELET # BLD AUTO: 244 10*3/MM3 (ref 140–450)
PMV BLD AUTO: 10.9 FL (ref 6–12)
POTASSIUM SERPL-SCNC: 3.4 MMOL/L (ref 3.5–5.2)
PROTHROMBIN TIME: 14.5 SECONDS (ref 11.7–14.2)
QT INTERVAL: 394 MS
QTC INTERVAL: 458 MS
RBC # BLD AUTO: 2.96 10*6/MM3 (ref 3.77–5.28)
SODIUM SERPL-SCNC: 143 MMOL/L (ref 136–145)
WBC NRBC COR # BLD AUTO: 6.41 10*3/MM3 (ref 3.4–10.8)

## 2025-05-17 PROCEDURE — 94664 DEMO&/EVAL PT USE INHALER: CPT

## 2025-05-17 PROCEDURE — 94799 UNLISTED PULMONARY SVC/PX: CPT

## 2025-05-17 PROCEDURE — 25010000002 PROPOFOL 10 MG/ML EMULSION: Performed by: ANESTHESIOLOGY

## 2025-05-17 PROCEDURE — 45385 COLONOSCOPY W/LESION REMOVAL: CPT | Performed by: INTERNAL MEDICINE

## 2025-05-17 PROCEDURE — 88305 TISSUE EXAM BY PATHOLOGIST: CPT | Performed by: INTERNAL MEDICINE

## 2025-05-17 PROCEDURE — 93010 ELECTROCARDIOGRAM REPORT: CPT | Performed by: INTERNAL MEDICINE

## 2025-05-17 PROCEDURE — 25010000002 LIDOCAINE 2% SOLUTION: Performed by: ANESTHESIOLOGY

## 2025-05-17 PROCEDURE — 85576 BLOOD PLATELET AGGREGATION: CPT | Performed by: THORACIC SURGERY (CARDIOTHORACIC VASCULAR SURGERY)

## 2025-05-17 PROCEDURE — 93005 ELECTROCARDIOGRAM TRACING: CPT | Performed by: INTERNAL MEDICINE

## 2025-05-17 PROCEDURE — 0DBN8ZX EXCISION OF SIGMOID COLON, VIA NATURAL OR ARTIFICIAL OPENING ENDOSCOPIC, DIAGNOSTIC: ICD-10-PCS | Performed by: INTERNAL MEDICINE

## 2025-05-17 PROCEDURE — 93005 ELECTROCARDIOGRAM TRACING: CPT | Performed by: THORACIC SURGERY (CARDIOTHORACIC VASCULAR SURGERY)

## 2025-05-17 PROCEDURE — 25810000003 LACTATED RINGERS PER 1000 ML: Performed by: ANESTHESIOLOGY

## 2025-05-17 PROCEDURE — 85025 COMPLETE CBC W/AUTO DIFF WBC: CPT | Performed by: INTERNAL MEDICINE

## 2025-05-17 PROCEDURE — 80048 BASIC METABOLIC PNL TOTAL CA: CPT | Performed by: INTERNAL MEDICINE

## 2025-05-17 PROCEDURE — 94761 N-INVAS EAR/PLS OXIMETRY MLT: CPT

## 2025-05-17 PROCEDURE — 0W3P8ZZ CONTROL BLEEDING IN GASTROINTESTINAL TRACT, VIA NATURAL OR ARTIFICIAL OPENING ENDOSCOPIC: ICD-10-PCS | Performed by: INTERNAL MEDICINE

## 2025-05-17 PROCEDURE — 99232 SBSQ HOSP IP/OBS MODERATE 35: CPT | Performed by: INTERNAL MEDICINE

## 2025-05-17 PROCEDURE — 85610 PROTHROMBIN TIME: CPT | Performed by: INTERNAL MEDICINE

## 2025-05-17 PROCEDURE — 25810000003 SODIUM CHLORIDE 0.9 % SOLUTION: Performed by: INTERNAL MEDICINE

## 2025-05-17 PROCEDURE — 45382 COLONOSCOPY W/CONTROL BLEED: CPT | Performed by: INTERNAL MEDICINE

## 2025-05-17 DEVICE — DEV CLIP ENDO RESOLUTION360 CONTRL ROT 235CM: Type: IMPLANTABLE DEVICE | Site: CECUM | Status: FUNCTIONAL

## 2025-05-17 RX ORDER — SODIUM CHLORIDE 9 MG/ML
1000 INJECTION, SOLUTION INTRAVENOUS CONTINUOUS
Status: ACTIVE | OUTPATIENT
Start: 2025-05-17 | End: 2025-05-17

## 2025-05-17 RX ORDER — CLOPIDOGREL BISULFATE 75 MG/1
75 TABLET ORAL DAILY
Status: DISCONTINUED | OUTPATIENT
Start: 2025-05-17 | End: 2025-05-18 | Stop reason: HOSPADM

## 2025-05-17 RX ORDER — SODIUM CHLORIDE 0.9 % (FLUSH) 0.9 %
10 SYRINGE (ML) INJECTION AS NEEDED
Status: DISCONTINUED | OUTPATIENT
Start: 2025-05-17 | End: 2025-05-17 | Stop reason: HOSPADM

## 2025-05-17 RX ORDER — EPHEDRINE SULFATE 50 MG/ML
INJECTION, SOLUTION INTRAVENOUS AS NEEDED
Status: DISCONTINUED | OUTPATIENT
Start: 2025-05-17 | End: 2025-05-17 | Stop reason: SURG

## 2025-05-17 RX ORDER — LIDOCAINE HYDROCHLORIDE 20 MG/ML
INJECTION, SOLUTION INFILTRATION; PERINEURAL AS NEEDED
Status: DISCONTINUED | OUTPATIENT
Start: 2025-05-17 | End: 2025-05-17 | Stop reason: SURG

## 2025-05-17 RX ORDER — SODIUM CHLORIDE, SODIUM LACTATE, POTASSIUM CHLORIDE, CALCIUM CHLORIDE 600; 310; 30; 20 MG/100ML; MG/100ML; MG/100ML; MG/100ML
INJECTION, SOLUTION INTRAVENOUS CONTINUOUS PRN
Status: DISCONTINUED | OUTPATIENT
Start: 2025-05-17 | End: 2025-05-17 | Stop reason: SURG

## 2025-05-17 RX ORDER — PROPOFOL 10 MG/ML
VIAL (ML) INTRAVENOUS CONTINUOUS PRN
Status: DISCONTINUED | OUTPATIENT
Start: 2025-05-17 | End: 2025-05-17 | Stop reason: SURG

## 2025-05-17 RX ADMIN — PROPOFOL 100 MCG/KG/MIN: 10 INJECTION, EMULSION INTRAVENOUS at 11:52

## 2025-05-17 RX ADMIN — Medication 10 ML: at 20:29

## 2025-05-17 RX ADMIN — SODIUM CHLORIDE 1000 ML: 9 INJECTION, SOLUTION INTRAVENOUS at 11:44

## 2025-05-17 RX ADMIN — POLYETHYLENE GLYCOL 3350 0.5 BOTTLE: 17 POWDER, FOR SOLUTION ORAL at 04:22

## 2025-05-17 RX ADMIN — ROSUVASTATIN CALCIUM 20 MG: 20 TABLET, FILM COATED ORAL at 20:29

## 2025-05-17 RX ADMIN — ARFORMOTEROL TARTRATE 15 MCG: 15 SOLUTION RESPIRATORY (INHALATION) at 07:28

## 2025-05-17 RX ADMIN — BUDESONIDE 0.5 MG: 0.5 INHALANT RESPIRATORY (INHALATION) at 18:58

## 2025-05-17 RX ADMIN — CLOPIDOGREL BISULFATE 75 MG: 75 TABLET, FILM COATED ORAL at 08:44

## 2025-05-17 RX ADMIN — SODIUM CHLORIDE, POTASSIUM CHLORIDE, SODIUM LACTATE AND CALCIUM CHLORIDE: 600; 310; 30; 20 INJECTION, SOLUTION INTRAVENOUS at 11:49

## 2025-05-17 RX ADMIN — ARFORMOTEROL TARTRATE 15 MCG: 15 SOLUTION RESPIRATORY (INHALATION) at 18:57

## 2025-05-17 RX ADMIN — METOPROLOL TARTRATE 5 MG: 5 INJECTION INTRAVENOUS at 14:02

## 2025-05-17 RX ADMIN — LIDOCAINE HYDROCHLORIDE 60 MG: 20 INJECTION, SOLUTION INFILTRATION; PERINEURAL at 11:52

## 2025-05-17 RX ADMIN — Medication 10 ML: at 08:44

## 2025-05-17 RX ADMIN — BUDESONIDE 0.5 MG: 0.5 INHALANT RESPIRATORY (INHALATION) at 07:29

## 2025-05-17 RX ADMIN — EPHEDRINE SULFATE 10 MG: 50 INJECTION INTRAMUSCULAR; INTRAVENOUS; SUBCUTANEOUS at 12:06

## 2025-05-17 NOTE — ANESTHESIA PREPROCEDURE EVALUATION
Anesthesia Evaluation     NPO Solid Status: > 8 hours  NPO Liquid Status: > 8 hours           Airway   Mallampati: I  No difficulty expected  Dental      Pulmonary    (+) COPD,shortness of breath  Cardiovascular     (+) hypertension, past MI  <3 months, CAD      Neuro/Psych  GI/Hepatic/Renal/Endo      Musculoskeletal     Abdominal    Substance History      OB/GYN          Other   arthritis,     ROS/Med Hx Other: 80-year-old woman with hypertension, inferior STEMI a month ago status post PCI to the RCA who presented to [facility] with shortness of breath and melena.  She was found to be anemic with hemoglobin of 6.5.  She received multiple units of PRBCs and hemoglobin improved to about 9.  DAPT was held, she was placed on Protonix drip and while waiting for EGD she had an episode of chest discomfort.  Given residual severe CAD, she was transferred here for further management of her GI bleed as well as possible revascularization with CABG.     EGD today was normal.  Plan is for colonoscopy     -With respect to her CAD, this is a complicated situation.  She has not been able to tolerate DAPT though this was with Brilinta not with a less potent agent such as Plavix. Does not have great veins for surgery and likely to frail for radial or bilateral IMAs.  Would pursue continued medical management of her CAD with a Plavix challenge while inpatient, pending colonoscopy findings.  If she were able to tolerate Plavix instead of Brilinta over the next couple of months, PCI of the other vessels could be considered.                   Anesthesia Plan    ASA 4     MAC         CODE STATUS:    Code Status (Patient has no pulse and is not breathing): CPR (Attempt to Resuscitate)  Medical Interventions (Patient has pulse or is breathing): Full Support

## 2025-05-17 NOTE — PROGRESS NOTES
Yanni Vega  1945 80 y.o.  [id number]      Patient Care Team:  Carmen Ayon MD as PCP - General (Internal Medicine)    CC: Inferior MI April 2025 with stenting, significant disease in the LAD diagonal and circumflex but no symptoms, normal LV function, in with a GI bleed    Interval History: She has done well overnight her EGD yesterday was normal she is going for colonoscopy today      Objective   Vital Signs  Temp:  [97.9 °F (36.6 °C)-98.4 °F (36.9 °C)] 98.2 °F (36.8 °C)  Heart Rate:  [67-90] 71  Resp:  [14-18] 18  BP: ()/(17-88) 113/58    Intake/Output Summary (Last 24 hours) at 5/17/2025 0952  Last data filed at 5/17/2025 0839  Gross per 24 hour   Intake 1210 ml   Output 300 ml   Net 910 ml     Flowsheet Rows      Flowsheet Row First Filed Value   Admission Height --   Admission Weight 42 kg (92 lb 9.6 oz) Documented at 05/14/2025 1849            Physical Exam:   General Appearance:    Alert,oriented, in no acute distress   Lungs:     Clear to auscultation,BS are equal    Heart:    Normal S1 and S2, RRR without murmur, gallop or rub   HEENT:    Sclerae are clear, no JVD or adenopathy   Abdomen:     Normal bowel sounds, soft nontender, nondistended, no HSM   Extremities:   Moves all extremities well, no edema, no cyanosis, no             Redness, no rash     Medication Review:      budesonide, 0.5 mg, Nebulization, BID - RT   And  arformoterol, 15 mcg, Nebulization, BID - RT  clopidogrel, 75 mg, Oral, Daily  metoprolol succinate XL, 12.5 mg, Oral, Daily  nicotine, 1 patch, Transdermal, Q24H  rosuvastatin, 20 mg, Oral, Nightly  sodium chloride, 10 mL, Intravenous, Q12H             I reviewed the patient's new clinical results.  I personally viewed and interpreted the patient's EKG/Telemetry data    Assessment/Plan  Active Hospital Problems    Diagnosis  POA    **GI bleed [K92.2]  Yes      Resolved Hospital Problems   No resolved problems to display.       She came in with a fairly significant  GI bleed she was on Brilinta after her stent at age 80.  She has had that held her upper endoscopy was normal she is going for a colonoscopy today we will likely resume clopidogrel after that I would only treat her residual coronary disease if she started to develop symptoms of note her HDL was 57 LDL 31 and A1c was 5.3    Jamal Lopez MD  05/17/25  09:52 EDT

## 2025-05-17 NOTE — PROGRESS NOTES
No angina.  We should probably start Plavix.  Will see how she does.  Medical treatment for now and then maybe follow-up later with Dr. Stein

## 2025-05-18 ENCOUNTER — READMISSION MANAGEMENT (OUTPATIENT)
Dept: CALL CENTER | Facility: HOSPITAL | Age: 80
End: 2025-05-18
Payer: MEDICARE

## 2025-05-18 VITALS
DIASTOLIC BLOOD PRESSURE: 58 MMHG | OXYGEN SATURATION: 95 % | SYSTOLIC BLOOD PRESSURE: 120 MMHG | RESPIRATION RATE: 18 BRPM | WEIGHT: 92.59 LBS | HEART RATE: 86 BPM | BODY MASS INDEX: 17.04 KG/M2 | TEMPERATURE: 98.1 F | HEIGHT: 62 IN

## 2025-05-18 PROBLEM — D12.6 ADENOMATOUS POLYP OF COLON: Status: ACTIVE | Noted: 2025-05-18

## 2025-05-18 PROBLEM — Z95.820 S/P ANGIOPLASTY WITH STENT: Status: ACTIVE | Noted: 2025-05-18

## 2025-05-18 LAB
CLOSE TME COLL+ADP + EPINEP PNL BLD: 23 % (ref 86–100)
DEPRECATED RDW RBC AUTO: 50 FL (ref 37–54)
ERYTHROCYTE [DISTWIDTH] IN BLOOD BY AUTOMATED COUNT: 14.4 % (ref 12.3–15.4)
HCT VFR BLD AUTO: 27.1 % (ref 34–46.6)
HGB BLD-MCNC: 8.7 G/DL (ref 12–15.9)
MCH RBC QN AUTO: 30.1 PG (ref 26.6–33)
MCHC RBC AUTO-ENTMCNC: 32.1 G/DL (ref 31.5–35.7)
MCV RBC AUTO: 93.8 FL (ref 79–97)
PLATELET # BLD AUTO: 217 10*3/MM3 (ref 140–450)
PMV BLD AUTO: 10.3 FL (ref 6–12)
QT INTERVAL: 387 MS
QT INTERVAL: 403 MS
QTC INTERVAL: 439 MS
QTC INTERVAL: 513 MS
RBC # BLD AUTO: 2.89 10*6/MM3 (ref 3.77–5.28)
WBC NRBC COR # BLD AUTO: 8 10*3/MM3 (ref 3.4–10.8)

## 2025-05-18 PROCEDURE — 85576 BLOOD PLATELET AGGREGATION: CPT | Performed by: THORACIC SURGERY (CARDIOTHORACIC VASCULAR SURGERY)

## 2025-05-18 PROCEDURE — 99232 SBSQ HOSP IP/OBS MODERATE 35: CPT | Performed by: INTERNAL MEDICINE

## 2025-05-18 PROCEDURE — 85027 COMPLETE CBC AUTOMATED: CPT | Performed by: INTERNAL MEDICINE

## 2025-05-18 RX ORDER — DIPHENOXYLATE HYDROCHLORIDE AND ATROPINE SULFATE 2.5; .025 MG/1; MG/1
1 TABLET ORAL DAILY
Qty: 90 TABLET | Refills: 5 | Status: SHIPPED | OUTPATIENT
Start: 2025-05-18

## 2025-05-18 RX ORDER — DIPHENOXYLATE HYDROCHLORIDE AND ATROPINE SULFATE 2.5; .025 MG/1; MG/1
1 TABLET ORAL DAILY
Status: DISCONTINUED | OUTPATIENT
Start: 2025-05-18 | End: 2025-05-18 | Stop reason: HOSPADM

## 2025-05-18 RX ORDER — FERROUS SULFATE 325(65) MG
325 TABLET ORAL
Qty: 90 TABLET | Refills: 5 | Status: SHIPPED | OUTPATIENT
Start: 2025-05-18

## 2025-05-18 RX ORDER — FERROUS SULFATE 325(65) MG
325 TABLET ORAL
Status: DISCONTINUED | OUTPATIENT
Start: 2025-05-18 | End: 2025-05-18 | Stop reason: HOSPADM

## 2025-05-18 RX ORDER — CLOPIDOGREL BISULFATE 75 MG/1
75 TABLET ORAL DAILY
Qty: 90 TABLET | Refills: 5 | Status: SHIPPED | OUTPATIENT
Start: 2025-05-19

## 2025-05-18 RX ADMIN — CLOPIDOGREL BISULFATE 75 MG: 75 TABLET, FILM COATED ORAL at 08:43

## 2025-05-18 RX ADMIN — FERROUS SULFATE TAB 325 MG (65 MG ELEMENTAL FE) 325 MG: 325 (65 FE) TAB at 10:52

## 2025-05-18 RX ADMIN — Medication 10 ML: at 08:43

## 2025-05-18 RX ADMIN — Medication 1 TABLET: at 10:52

## 2025-05-18 NOTE — PROGRESS NOTES
Yanni Vega  1945 80 y.o.  [id number]      Patient Care Team:  Carmen Ayon MD as PCP - General (Internal Medicine)    CC: Inferior MI April 2025 with stenting, significant disease in the LAD diagonal and circumflex but no symptoms, normal LV function, in with a GI bleed    Interval History: She has done well overnight      Objective   Vital Signs  Temp:  [97.4 °F (36.3 °C)-98.4 °F (36.9 °C)] 97.9 °F (36.6 °C)  Heart Rate:  [] 86  Resp:  [16-18] 18  BP: ()/(37-84) 120/58    Intake/Output Summary (Last 24 hours) at 5/18/2025 1035  Last data filed at 5/18/2025 0842  Gross per 24 hour   Intake 820 ml   Output --   Net 820 ml     Flowsheet Rows      Flowsheet Row First Filed Value   Admission Height --   Admission Weight 42 kg (92 lb 9.6 oz) Documented at 05/14/2025 1849            Physical Exam:   General Appearance:    Alert,oriented, in no acute distress   Lungs:     Clear to auscultation,BS are equal    Heart:    Normal S1 and S2, RRR without murmur, gallop or rub   HEENT:    Sclerae are clear, no JVD or adenopathy   Abdomen:     Normal bowel sounds, soft nontender, nondistended, no HSM   Extremities:   Moves all extremities well, no edema, no cyanosis, no             Redness, no rash     Medication Review:      budesonide, 0.5 mg, Nebulization, BID - RT   And  arformoterol, 15 mcg, Nebulization, BID - RT  clopidogrel, 75 mg, Oral, Daily  ferrous sulfate, 325 mg, Oral, Daily With Breakfast  metoprolol succinate XL, 12.5 mg, Oral, Daily  multivitamin, 1 tablet, Oral, Daily  nicotine, 1 patch, Transdermal, Q24H  rosuvastatin, 20 mg, Oral, Nightly  sodium chloride, 10 mL, Intravenous, Q12H             I reviewed the patient's new clinical results.  I personally viewed and interpreted the patient's EKG/Telemetry data    Assessment/Plan  Active Hospital Problems    Diagnosis  POA    **GI bleed [K92.2]  Yes      Resolved Hospital Problems   No resolved problems to display.     This is a lady who  had an inferior STEMI in April 2025 treated with drug-eluting stenting to her RCA at that time she had significant disease of her LAD that was going to be managed medically.  She came in with an acute GI bleed.  Really her upper GI eval looked pretty good she had a couple of polyps in her colon.  But nothing that was like actively bleeding.  We stopped the Brilinta and have just put her on clopidogrel she has been stable with that her hemoglobin is 8.7.  I have started on iron and a multivitamin.  I would not treat her LAD disease unless she started having angina refractory to medical therapy it is not going to be easy and its small and her diagonals probably do not be jeopardized or lost.  She is not a great person for bifurcation stent especially in light of her GI bleeding which I think could be small bowel in etiology because we did not really see much.  In my opinion she could go home today and follow-up with her [facility] cardiologist    Jamal Lopez MD  05/18/25  10:35 EDT

## 2025-05-18 NOTE — PLAN OF CARE
Patient's bloody stools have resolved.  Vitals are WNL.  Patient is waiting on cardiology for the next step in her hospital progression

## 2025-05-18 NOTE — PROGRESS NOTES
[facility]  Inpatient Progress Note    Reason for Follow Up:  Melena    Subjective     Interval History:   Eating and feeling well, hgb stable. No further bleeding    Current Facility-Administered Medications:     acetaminophen (TYLENOL) tablet 650 mg, 650 mg, Oral, Q4H PRN, Vicky Mccoy MD    albuterol (PROVENTIL) nebulizer solution 0.083% 2.5 mg/3mL, 2.5 mg, Nebulization, Q6H PRN, Vicky Mccoy MD, 2.5 mg at 05/16/25 1214    ALPRAZolam (XANAX) tablet 0.25 mg, 0.25 mg, Oral, Q6H PRN, Vicky Mccoy MD    aluminum-magnesium hydroxide-simethicone (MAALOX MAX) 400-400-40 MG/5ML suspension 15 mL, 15 mL, Oral, Q6H PRN, Vicky Mccoy MD    budesonide (PULMICORT) nebulizer solution 0.5 mg, 0.5 mg, Nebulization, BID - RT, 0.5 mg at 05/17/25 1858 **AND** arformoterol (BROVANA) nebulizer solution 15 mcg, 15 mcg, Nebulization, BID - RT, Vicky Mccoy MD, 15 mcg at 05/17/25 1857    sennosides-docusate (PERICOLACE) 8.6-50 MG per tablet 2 tablet, 2 tablet, Oral, BID PRN **AND** polyethylene glycol (MIRALAX) packet 17 g, 17 g, Oral, Daily PRN **AND** bisacodyl (DULCOLAX) EC tablet 5 mg, 5 mg, Oral, Daily PRN **AND** bisacodyl (DULCOLAX) suppository 10 mg, 10 mg, Rectal, Daily PRN, Vicky Mccoy MD    Calcium Replacement - Follow Nurse / BPA Driven Protocol, , Not Applicable, PRN, Vicky Mccoy MD    clopidogrel (PLAVIX) tablet 75 mg, 75 mg, Oral, Daily, Jr Kyle Kang MD, 75 mg at 05/18/25 0843    ferrous sulfate tablet 325 mg, 325 mg, Oral, Daily With Breakfast, Jamal Lopez MD, 325 mg at 05/18/25 1052    Magnesium Low Dose Replacement - Follow Nurse / BPA Driven Protocol, , Not Applicable, PRN, Vicky Mccoy MD    metoprolol succinate XL (TOPROL-XL) 24 hr tablet 12.5 mg, 12.5 mg, Oral, Daily, Vicky Mccoy MD, 12.5 mg at 05/16/25 1302    morphine injection 2 mg, 2 mg, Intravenous, Q4H PRN, Vicky Mccoy MD    multivitamin (THERAGRAN) tablet 1  tablet, 1 tablet, Oral, Daily, JohnJamal dunn MD, 1 tablet at 05/18/25 1052    naloxone (NARCAN) injection 0.4 mg, 0.4 mg, Intravenous, Q5 Min PRN, Vicky Mccoy MD    nicotine (NICODERM CQ) 21 MG/24HR patch 1 patch, 1 patch, Transdermal, Q24H, Vicky Mccoy MD    nitroglycerin (NITROSTAT) SL tablet 0.4 mg, 0.4 mg, Sublingual, Q5 Min PRN, Vicky Mccoy MD    ondansetron (ZOFRAN) injection 4 mg, 4 mg, Intravenous, Q4H PRN, Vicky Mccoy MD    Phosphorus Replacement - Follow Nurse / BPA Driven Protocol, , Not Applicable, PRNNadine Lauren C., MD    rosuvastatin (CRESTOR) tablet 20 mg, 20 mg, Oral, Nightly, Vicky Mccoy MD, 20 mg at 05/17/25 2029    sodium chloride 0.9 % flush 10 mL, 10 mL, Intravenous, Q12H, Vicky Mccoy MD, 10 mL at 05/18/25 0843    sodium chloride 0.9 % flush 10 mL, 10 mL, Intravenous, PRN, Vicky Mccoy MD    sodium chloride 0.9 % infusion 40 mL, 40 mL, Intravenous, PRN, Vicky Mccoy MD    temazepam (RESTORIL) capsule 15 mg, 15 mg, Oral, Nightly PRN, Vicky Mccoy MD  Review of Systems:    The following systems were reviewed and negative;  gastrointestinal    Objective     Vital Signs  Temp:  [97.4 °F (36.3 °C)-98.4 °F (36.9 °C)] 98.1 °F (36.7 °C)  Heart Rate:  [] 86  Resp:  [16-18] 18  BP: ()/(50-66) 120/58  Body mass index is 16.94 kg/m².    Intake/Output Summary (Last 24 hours) at 5/18/2025 1257  Last data filed at 5/18/2025 0842  Gross per 24 hour   Intake 820 ml   Output --   Net 820 ml     I/O this shift:  In: 480 [P.O.:480]  Out: -      Physical Exam:   General: patient awake, alert and cooperative   Eyes: Normal lids and lashes, no scleral icterus   Neck: supple, normal ROM   Skin: warm and dry, not jaundiced   Cardiovascular: regular rhythm and rate, no murmurs auscultated   Pulm: clear to auscultation bilaterally, regular and unlabored   Abdomen: soft, nontender, nondistended; normal bowel sounds   Extremities: no rash or  edema   Psychiatric: Normal mood and behavior; memory intact     Results Review:     I reviewed the patient's new clinical results.    Results from last 7 days   Lab Units 05/18/25  0437 05/17/25 0252 05/16/25 0345   WBC 10*3/mm3 8.00 6.41 5.87   HEMOGLOBIN g/dL 8.7* 8.8* 8.8*   HEMATOCRIT % 27.1* 29.6* 27.9*   PLATELETS 10*3/mm3 217 244 229     Results from last 7 days   Lab Units 05/17/25  0252 05/16/25  0345 05/15/25  0259 05/14/25  1351 05/14/25  0548 05/14/25  0041 05/13/25  1204   SODIUM mmol/L 143 142 142   < > 140  --  139   POTASSIUM mmol/L 3.4* 3.6 3.6   < > 3.3*   < > 3.3*   CHLORIDE mmol/L 115* 114* 113*   < > 112*  --  107   CO2 mmol/L 18.8* 20.3* 19.6*   < > 19.8*  --  19.6*   BUN mg/dL 6* 11 9   < > 9  --  10   CREATININE mg/dL 0.76 1.03* 0.86   < > 0.88  --  0.89   CALCIUM mg/dL 7.9* 8.0* 8.1*   < > 7.9*  --  9.0   BILIRUBIN mg/dL  --   --   --   --  0.6  --  0.5   ALK PHOS U/L  --   --   --   --  91  --  109   ALT (SGPT) U/L  --   --   --   --  11  --  13   AST (SGOT) U/L  --   --   --   --  19  --  23   GLUCOSE mg/dL 87 91 89   < > 91  --  97    < > = values in this interval not displayed.     Results from last 7 days   Lab Units 05/17/25 0252   INR  1.14*     Lab Results   Lab Value Date/Time    LIPASE 25 04/08/2025 1522       Radiology:  No orders to display       Assessment & Plan     Active Hospital Problems    Diagnosis     **GI bleed     S/P angioplasty with stent     Adenomatous polyp of colon        Assessment:  Melena  Abla  Severe cad with PCI/drug eluting stent placement 6 weeks ago on brilinta/asa  -EGD hira  -Colon w/ AVM Cecum s/p ablation     Plan: Okay to restart antiplatelets from GI standpoint.  No further overt bleeding.  GI will sign off.  Okay for discharge from our standpoint         I discussed the patients findings and my recommendations with patient.    Patricia Meyer MD

## 2025-05-18 NOTE — DISCHARGE SUMMARY
Date of Admission: 5/14/2025  Date of Discharge:  5/18/2025    Discharge Diagnosis: Severe coronary artery disease status post stent to the RCA.  GI bleed from adenomatous polyps and telangiectasia in the colon.  No available venous conduit.  Residual LAD disease and small vessel.  Determined not candidate for bypass surgery or stent.    Presenting Problem/History of Present Illness  GI bleed [K92.2]     Chronic Comorbid Conditions relative to CABG include:  Cardiovascular: Acute MI, Coronary Artery Disease, Hyperlipidemia, and Hypertension  Respiratory: None  Endocrine: Malnutrition (BMI<20)  Nephrology: None  Hematology: Coagulopathy due to a medication  Other: None  Hospital Course  Patient is a 80 y.o. female presented with GI bleed status post stent to the RCA with Brilinta.  This was discontinued.  She was seen by GI who did an upper and lower endoscopy.  The upper GI was negative.  Lower endoscopy is as above with polyps and an area of telangiectasia.  The melena resolved.  She was seen by cardiology and felt that she was not a candidate for stent and we also saw her and she was not a candidate for bypass surgery.  She should be treated medically.  We restarted her platelet treatment with Plavix but no aspirin because of the GI bleed.  She will follow-up with cardiology with Dr. Emil Guy.      Procedures Performed  Procedure(s):  COLONOSCOPY TO CECUM WITH ABLATION OF AVM AT CECUM WITH RESOLUTION CLIP PLACEMENT X2 AND COLD SNARE POLYPECTOMY WITH RESOLUTION CLIP X1 AT POLYPECTOMY SITE  05/17 1143 Colonoscopy  05/16 1035 Upper GI Endoscopy    Consults:   Consults       Date and Time Order Name Status Description    5/15/2025  6:54 AM Inpatient Cardiology Consult Completed     5/14/2025 10:53 PM Inpatient Gastroenterology Consult Completed     5/14/2025  6:51 PM Inpatient Gastroenterology Consult              Pertinent Test Results:    Lab Results   Component Value Date    WBC 8.00 05/18/2025    HGB 8.7 (L)  05/18/2025    HCT 27.1 (L) 05/18/2025    MCV 93.8 05/18/2025     05/18/2025      Lab Results   Component Value Date    GLUCOSE 87 05/17/2025    CALCIUM 7.9 (L) 05/17/2025     05/17/2025    K 3.4 (L) 05/17/2025    CO2 18.8 (L) 05/17/2025     (H) 05/17/2025    BUN 6 (L) 05/17/2025    CREATININE 0.76 05/17/2025    BCR 7.9 05/17/2025    ANIONGAP 9.2 05/17/2025     Lab Results   Component Value Date    INR 1.14 (H) 05/17/2025    PROTIME 14.5 (H) 05/17/2025         Condition on Discharge:  Stable    Vital Signs  Temp:  [97.4 °F (36.3 °C)-98.4 °F (36.9 °C)] 97.9 °F (36.6 °C)  Heart Rate:  [] 86  Resp:  [16-18] 18  BP: ()/(37-84) 120/58  Body mass index is 16.94 kg/m².    Discharge Disposition  Home or Self Care    Discharge Medications     Discharge Medications        New Medications        Instructions Start Date   clopidogrel 75 MG tablet  Commonly known as: PLAVIX   75 mg, Oral, Daily   Start Date: May 19, 2025     ferrous sulfate 325 (65 FE) MG tablet   325 mg, Oral, Daily With Breakfast      multivitamin tablet tablet   1 tablet, Oral, Daily             Continue These Medications        Instructions Start Date   Breo Ellipta 200-25 MCG/ACT inhaler  Generic drug: Fluticasone Furoate-Vilanterol   1 puff, Daily      cholecalciferol 25 MCG (1000 UT) tablet  Commonly known as: VITAMIN D3   1,000 Units, Daily      docusate sodium 100 MG capsule  Commonly known as: COLACE   100 mg, Daily      ibandronate 150 MG tablet  Commonly known as: BONIVA   150 mg, Every 30 Days      metoprolol succinate XL 25 MG 24 hr tablet  Commonly known as: TOPROL-XL   12.5 mg, Oral, Daily      nicotine 21 MG/24HR patch  Commonly known as: NICODERM CQ   1 patch, Transdermal, Every 24 Hours Scheduled      nitroglycerin 0.4 MG SL tablet  Commonly known as: NITROSTAT   0.4 mg, Sublingual, Every 5 Minutes PRN, Take no more than 3 doses in 15 minutes.      pantoprazole 40 MG injection  Commonly known as: PROTONIX   40 mg,  Intravenous, Every 12 Hours Scheduled      ProAir RespiClick 108 (90 Base) MCG/ACT inhaler  Generic drug: albuterol   2 puffs, Every 6 Hours PRN      rosuvastatin 20 MG tablet  Commonly known as: CRESTOR   20 mg, Oral, Nightly             Stop These Medications      acetaminophen 325 MG tablet  Commonly known as: TYLENOL     ALPRAZolam 0.25 MG tablet  Commonly known as: XANAX     aluminum-magnesium hydroxide-simethicone 400-400-40 MG/5ML suspension  Commonly known as: MAALOX MAX     Brilinta 90 MG tablet tablet  Generic drug: ticagrelor     morphine 2 MG/ML injection     naloxone 0.4 MG/ML injection  Commonly known as: NARCAN     ondansetron 2 mg/mL injection  Commonly known as: ZOFRAN     temazepam 15 MG capsule  Commonly known as: RESTORIL              Discharge Diet:     Activity at Discharge:     Follow-up Appointments  No future appointments.  Additional Instructions for the Follow-ups that You Need to Schedule       Ambulatory Referral to Home Health   As directed      Face to Face Visit Date: 5/18/2025   Follow-up provider for Plan of Care?: I treated the patient in an acute care facility and will not continue treatment after discharge.   Follow-up provider: GERRY MARINO [[id number]]   Reason/Clinical Findings: s/p stent   Describe mobility limitations that make leaving home difficult: s/p stent   Nursing/Therapeutic Services Requested: Skilled Nursing   Skilled nursing orders: Medication education Cardiopulmonary assessments   Frequency: 1 Week 1        Discharge Follow-up with PCP   As directed       Currently Documented PCP:    Carmen Ayon MD    PCP Phone Number:    [phone]     Follow Up Details: Follow Up With PCP Within 7 Days if Not Able to Return to [facility] for Appointment Within 7 Days        Discharge Follow-up with Specialty: Cardiology; 2 Weeks   As directed      Specialty: Cardiology   Follow Up: 2 Weeks   Follow Up Details: Jose Guadalupe Guy        Cardiac Rehab Evaluation and  Enrollment   May 23, 2025      Reason for Consult: Education                Test Results Pending at Discharge  Pending Results       Procedure [Order ID] Specimen - Date/Time    Tissue Pathology Exam [[id number]] Collected: 05/17/25 1218    Specimen: Polyp from Large Intestine, Sigmoid Colon             Kyle Kang MD  05/18/25  10:57 EDT

## 2025-05-19 NOTE — CASE MANAGEMENT/SOCIAL WORK
Case Management Discharge Note      Final Note: CCP called Pt at home to discuss the home health order that was not addressed when she discharged yesterday.  Patient asked that CCP speak with her granddaugther Alivia Fisher.  Alivia got on the phone and she advised that they did not want home health services and if they changed their mind they would call and speak with Dr. Ayon........ShannonS./RNCM    Provided Post Acute Provider List?: Refused  Provided Post Acute Provider Quality & Resource List?: Refused    Selected Continued Care - Discharged on 5/18/2025 Admission date: 5/14/2025 - Discharge disposition: Home or Self Care      Destination    No services have been selected for the patient.                Durable Medical Equipment    No services have been selected for the patient.                Dialysis/Infusion    No services have been selected for the patient.                Home Medical Care    No services have been selected for the patient.                Therapy    No services have been selected for the patient.                Community Resources    No services have been selected for the patient.                Community & DME    No services have been selected for the patient.                         Final Discharge Disposition Code: 01 - home or self-care

## 2025-05-19 NOTE — OUTREACH NOTE
Prep Survey      Flowsheet Row Responses   [facility] facility patient discharged from? [facility]   Is LACE score < 7 ? No   Eligibility Readm Mgmt   Discharge diagnosis GI bleed s/p stent to RCA   Does the patient have one of the following disease processes/diagnoses(primary or secondary)? Other   Does the patient have Home health ordered? Yes   What is the Home health agency?  HH ordered   Is there a DME ordered? No   Prep survey completed? Yes            Rhiannon FLORES - Registered Nurse

## 2025-05-20 LAB
CYTO UR: NORMAL
LAB AP CASE REPORT: NORMAL
PATH REPORT.FINAL DX SPEC: NORMAL
PATH REPORT.GROSS SPEC: NORMAL

## 2025-05-22 ENCOUNTER — READMISSION MANAGEMENT (OUTPATIENT)
Dept: CALL CENTER | Facility: HOSPITAL | Age: 80
End: 2025-05-22
Payer: MEDICARE

## 2025-05-22 NOTE — OUTREACH NOTE
Medical Week 1 Survey      Flowsheet Row Responses   [facility] patient discharged from? [facility]   Does the patient have one of the following disease processes/diagnoses(primary or secondary)? Other   Week 1 attempt successful? Yes   Call start time 1220   Call end time 1222   Discharge diagnosis GI bleed s/p stent to RCA   Person spoke with today (if not patient) and relationship Granddaughter   Meds reviewed with patient/caregiver? Yes   Is the patient having any side effects they believe may be caused by any medication additions or changes? No   Does the patient have all medications ordered at discharge? Yes   Is the patient taking all medications as directed (includes completed medication regime)? Yes   Does the patient have a primary care provider?  Yes   Does the patient have an appointment with their PCP within 7 days of discharge? Yes   Has the patient kept scheduled appointments due by today? Yes   Psychosocial issues? No   Did the patient receive a copy of their discharge instructions? Yes   Nursing interventions Reviewed instructions with patient   What is the patient's perception of their health status since discharge? Improving   Is the patient/caregiver able to teach back signs and symptoms related to disease process for when to call PCP? Yes   Is the patient/caregiver able to teach back signs and symptoms related to disease process for when to call 911? Yes   Is the patient/caregiver able to teach back the hierarchy of who to call/visit for symptoms/problems? PCP, Specialist, Home health nurse, Urgent Care, ED, 911 Yes   If the patient is a current smoker, are they able to teach back resources for cessation? Not a smoker   Week 1 call completed? Yes   Graduated Yes   Would this patient benefit from a Referral to Amb Social Work? No   Is the patient interested in additional calls from an ambulatory ? No   Call end time 1222            Jas MARIN - Registered Nurse

## 2025-05-28 LAB
QT INTERVAL: 406 MS
QTC INTERVAL: 441 MS

## 2025-06-12 ENCOUNTER — OFFICE VISIT (OUTPATIENT)
Dept: CARDIOLOGY | Facility: CLINIC | Age: 80
End: 2025-06-12
Payer: MEDICARE

## 2025-06-12 ENCOUNTER — LAB (OUTPATIENT)
Facility: HOSPITAL | Age: 80
End: 2025-06-12
Payer: MEDICARE

## 2025-06-12 ENCOUNTER — RESULTS FOLLOW-UP (OUTPATIENT)
Dept: CARDIOLOGY | Facility: CLINIC | Age: 80
End: 2025-06-12

## 2025-06-12 VITALS
WEIGHT: 89 LBS | BODY MASS INDEX: 16.38 KG/M2 | HEIGHT: 62 IN | HEART RATE: 74 BPM | SYSTOLIC BLOOD PRESSURE: 114 MMHG | DIASTOLIC BLOOD PRESSURE: 59 MMHG

## 2025-06-12 DIAGNOSIS — I25.708 CORONARY ARTERY DISEASE INVOLVING CORONARY BYPASS GRAFT OF NATIVE HEART WITH OTHER FORMS OF ANGINA PECTORIS: ICD-10-CM

## 2025-06-12 DIAGNOSIS — D64.9 ANEMIA, UNSPECIFIED TYPE: ICD-10-CM

## 2025-06-12 DIAGNOSIS — Z95.5 S/P CORONARY ARTERY STENT PLACEMENT: ICD-10-CM

## 2025-06-12 DIAGNOSIS — E78.5 HYPERLIPIDEMIA LDL GOAL <70: ICD-10-CM

## 2025-06-12 DIAGNOSIS — D64.9 ANEMIA, UNSPECIFIED TYPE: Primary | ICD-10-CM

## 2025-06-12 LAB
ALBUMIN SERPL-MCNC: 4.3 G/DL (ref 3.5–5.2)
ALBUMIN/GLOB SERPL: 2 G/DL
ALP SERPL-CCNC: 84 U/L (ref 39–117)
ALT SERPL W P-5'-P-CCNC: 15 U/L (ref 1–33)
ANION GAP SERPL CALCULATED.3IONS-SCNC: 14 MMOL/L (ref 5–15)
AST SERPL-CCNC: 37 U/L (ref 1–32)
BILIRUB SERPL-MCNC: 0.5 MG/DL (ref 0–1.2)
BUN SERPL-MCNC: 12 MG/DL (ref 8–23)
BUN/CREAT SERPL: 15 (ref 7–25)
CALCIUM SPEC-SCNC: 9.6 MG/DL (ref 8.6–10.5)
CHLORIDE SERPL-SCNC: 106 MMOL/L (ref 98–107)
CO2 SERPL-SCNC: 23 MMOL/L (ref 22–29)
CREAT SERPL-MCNC: 0.8 MG/DL (ref 0.57–1)
DEPRECATED RDW RBC AUTO: 59.9 FL (ref 37–54)
EGFRCR SERPLBLD CKD-EPI 2021: 74.1 ML/MIN/1.73
ERYTHROCYTE [DISTWIDTH] IN BLOOD BY AUTOMATED COUNT: 16.8 % (ref 12.3–15.4)
GLOBULIN UR ELPH-MCNC: 2.2 GM/DL
GLUCOSE SERPL-MCNC: 109 MG/DL (ref 65–99)
HCT VFR BLD AUTO: 35.7 % (ref 34–46.6)
HGB BLD-MCNC: 11.1 G/DL (ref 12–15.9)
MCH RBC QN AUTO: 30 PG (ref 26.6–33)
MCHC RBC AUTO-ENTMCNC: 31.1 G/DL (ref 31.5–35.7)
MCV RBC AUTO: 96.5 FL (ref 79–97)
PLATELET # BLD AUTO: 185 10*3/MM3 (ref 140–450)
PMV BLD AUTO: 11.2 FL (ref 6–12)
POTASSIUM SERPL-SCNC: 3.9 MMOL/L (ref 3.5–5.2)
PROT SERPL-MCNC: 6.5 G/DL (ref 6–8.5)
RBC # BLD AUTO: 3.7 10*6/MM3 (ref 3.77–5.28)
SODIUM SERPL-SCNC: 143 MMOL/L (ref 136–145)
WBC NRBC COR # BLD AUTO: 5.89 10*3/MM3 (ref 3.4–10.8)

## 2025-06-12 PROCEDURE — 99214 OFFICE O/P EST MOD 30 MIN: CPT | Performed by: FAMILY MEDICINE

## 2025-06-12 PROCEDURE — 36415 COLL VENOUS BLD VENIPUNCTURE: CPT

## 2025-06-12 PROCEDURE — 80053 COMPREHEN METABOLIC PANEL: CPT

## 2025-06-12 PROCEDURE — 85027 COMPLETE CBC AUTOMATED: CPT

## 2025-06-12 RX ORDER — FERROUS SULFATE 325(65) MG
325 TABLET ORAL
Qty: 90 TABLET | Refills: 5 | Status: SHIPPED | OUTPATIENT
Start: 2025-06-12

## 2025-06-12 NOTE — PROGRESS NOTES
Chief Complaint  Follow-up    Subjective        History of Present Illness  Yanni Vega presents to [facility] CARDIOLOGY   Ms. Vega is an 80-year-old female coming in today for follow-up for coronary disease.  She underwent coronary stenting earlier this year, and had known residual disease, she began experiencing shortness of breath and chest pain, she ended up hospitalized with significant anemia when hemoglobin down below 7.  She required blood transfusions, GI scoping, she did have polyps with area of telangiectasia.  She was evaluated by cardiothoracic surgeons, and really is not a candidate for bypass, and not a very good candidate for coronary stenting.  Recommended to treat anemia and pursue medical management.  She continues to have some symptoms of shortness of breath, in addition to anemia she has underlying emphysema as well.  No chest pain or pressure.  Currently on an iron supplement.  Also on amlodipine 5 mg, although this is not reflected on today's medication list.  Brilinta and aspirin were stopped, she was changed to monotherapy with Plavix.        Past Medical History:   Diagnosis Date    Arthritis     COPD (chronic obstructive pulmonary disease)     Emphysema lung     Hypertension     Osteoporosis        Allergies   Allergen Reactions    Iodine Anaphylaxis    Lisinopril Swelling        Past Surgical History:   Procedure Laterality Date    CARDIAC CATHETERIZATION N/A 4/8/2025    Procedure: Left Heart Cath;  Surgeon: Rinku Azevedo MD;  Location: 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 WITH RESOLUTION CLIP PLACEMENT X2 AND COLD SNARE POLYPECTOMY WITH RESOLUTION CLIP X1 AT POLYPECTOMY SITE;  Surgeon: Patricia Meyer MD;  Location: [facility] ENDOSCOPY;  Service: Gastroenterology;  Laterality: N/A;  pre: MELENA  post: CECUM AVM, SIGMOID POLYP     ENDOSCOPY N/A 5/16/2025    Procedure: ESOPHAGOGASTRODUODENOSCOPY;  Surgeon: Vicky Mccoy MD;  Location: [facility] ENDOSCOPY;  Service: Gastroenterology;  Laterality: N/A;  PRE: GI bleed  POST:Hiatal Hernia    KNEE ARTHROPLASTY Left     ORTHOPEDIC SURGERY      knee        Social History  She  reports that she has quit smoking. Her smoking use included cigarettes. She has a 25 pack-year smoking history. She has never used smokeless tobacco. She reports current alcohol use of about 1.0 standard drink of alcohol per week. She reports that she does not use drugs.    Family History  Her Family history is unknown by patient.       Current Outpatient Medications on File Prior to Visit   Medication Sig    Breo Ellipta 200-25 MCG/ACT inhaler Inhale 1 puff Daily.    Cholecalciferol 25 MCG (1000 UT) tablet Take 1 tablet by mouth Daily.    clopidogrel (PLAVIX) 75 MG tablet Take 1 tablet by mouth Daily.    docusate sodium (COLACE) 100 MG capsule Take 1 capsule by mouth Daily.    ibandronate (BONIVA) 150 MG tablet Take 1 tablet by mouth Every 30 (Thirty) Days.    metoprolol succinate XL (TOPROL-XL) 25 MG 24 hr tablet Take 0.5 tablets by mouth Daily.    multivitamin (THERAGRAN) tablet tablet Take 1 tablet by mouth Daily.    nitroglycerin (NITROSTAT) 0.4 MG SL tablet Place 1 tablet under the tongue Every 5 (Five) Minutes As Needed for Chest Pain (Systolic BP Greater Than 100). Take no more than 3 doses in 15 minutes.    pantoprazole (PROTONIX) 40 MG injection Infuse 10 mL into a venous catheter Every 12 (Twelve) Hours. Indications: Gastrointestinal (GI) Bleed    ProAir RespiClick 108 (90 Base) MCG/ACT inhaler Inhale 2 puffs Every 6 (Six) Hours As Needed for Wheezing or Shortness of Air.    rosuvastatin (CRESTOR) 20 MG tablet Take 1 tablet by mouth Every Night.     No current facility-administered medications on file prior to visit.         Review of Systems   Positive for dyspnea and fatigue    Objective   Vitals:    06/12/25  "1511   BP: 114/59   Pulse: 74   Weight: 40.4 kg (89 lb)   Height: 157.5 cm (62\")         Physical Exam  General : Alert, awake, no acute distress  Neck : Supple, no carotid bruit, no jugular venous distention  CVS : Regular rate and rhythm, no murmur, no rubs or gallops  Lungs: Clear to auscultation bilaterally, no crackles or rhonchi  Abdomen: Soft, nontender, bowel sounds active  Extremities: Warm, well-perfused, no pedal edema      Result Review     The following data was reviewed by LASHONDA Chowdhury  proBNP   Date Value Ref Range Status   05/13/2025 924.8 0.0 - 1,800.0 pg/mL Final     CMP          5/16/2025    03:45 5/17/2025    02:52   CMP   Glucose 91  87    BUN 11  6    Creatinine 1.03  0.76    EGFR 54.7  78.8    Sodium 142  143    Potassium 3.6  3.4    Chloride 114  115    Calcium 8.0  7.9    Total Protein     Albumin     Globulin     Total Bilirubin     Alkaline Phosphatase     AST (SGOT)     ALT (SGPT)     Albumin/Globulin Ratio     BUN/Creatinine Ratio 10.7  7.9    Anion Gap 7.7  9.2      CBC w/diff          5/16/2025    03:45 5/17/2025    02:52 5/18/2025    04:37   CBC w/Diff   WBC 5.87  6.41  8.00    RBC 2.95  2.96  2.89    Hemoglobin 8.8  8.8  8.7    Hematocrit 27.9  29.6  27.1    MCV 94.6  100.0  93.8    MCH 29.8  29.7  30.1    MCHC 31.5  29.7  32.1    RDW 14.4  14.6  14.4    Platelets 229  244  217    Neutrophil Rel % 62.3  63.7     Immature Granulocyte Rel % 0.3  0.3     Lymphocyte Rel % 19.3  19.7     Monocyte Rel % 14.1  11.1     Eosinophil Rel % 3.7  4.7     Basophil Rel % 0.3  0.5        Lab Results   Component Value Date    TSH 1.360 04/08/2025      No results found for: \"FREET4\"   No results found for: \"DDIMERQUANT\"  Magnesium   Date Value Ref Range Status   04/10/2025 2.1 1.6 - 2.4 mg/dL Final      No results found for: \"DIGOXIN\"   Lab Results   Component Value Date    TROPONINT 17 (H) 05/14/2025           Lipid Panel          4/9/2025    02:10 5/14/2025    13:51   Lipid Panel   Total " Cholesterol 178  101    Triglycerides 101  55    HDL Cholesterol 67  57    VLDL Cholesterol 18  13    LDL Cholesterol  93  31    LDL/HDL Ratio 1.36  0.58          Results for orders placed during the hospital encounter of 04/08/25    Adult Transthoracic Echo Complete W/ Cont if Necessary Per Protocol    Interpretation Summary    Left ventricular ejection fraction appears to be 61 - 65%.    Left ventricular wall thickness is consistent with mild to moderate concentric hypertrophy.    Left ventricular diastolic function is consistent with (grade I) impaired relaxation.    The left atrial cavity is mildly dilated.    There are myxomatous changes of the mitral valve apparatus present.    Estimated right ventricular systolic pressure from tricuspid regurgitation is moderately elevated (45-55 mmHg).             Assessment and Plan   Diagnoses and all orders for this visit:    1. Anemia, unspecified type (Primary)  -     CBC (No Diff); Future  -     Comprehensive Metabolic Panel; Future    2. Coronary artery disease involving coronary bypass graft of native heart with other forms of angina pectoris    3. S/P coronary artery stent placement    4. Hyperlipidemia LDL goal <70    Other orders  -     ferrous sulfate 325 (65 FE) MG tablet; Take 1 tablet by mouth Daily With Breakfast.  Dispense: 90 tablet; Refill: 5      Coronary artery disease-although she continues to have some symptoms they seem stable in nature.  Continue Plavix as monotherapy due to recent bleeding issues.  We will continue to manage medically, for minimum of 6 months, but unlikely to be a good candidate for coronary stenting.  Continue beta-blocker and calcium channel blocker for antianginal properties.    Anemia-she was concerned that perhaps she still having bleeding due to dark stools, however think this may be correlated with her iron supplement, we will recheck a CBC to make sure.    Hyperlipidemia-LDL at goal at 31.  Continue current dose  rosuvastatin.    Follow Up   Return in about 3 months (around 9/12/2025) for With Dr. Guy.    Patient was given instructions and counseling regarding her condition or for health maintenance advice. Please see specific information pulled into the AVS if appropriate.     Signed,  LASHONDA Chowdhury  06/12/2025     Dictated Utilizing Dragon Dictation: Please note that portions of this note were completed with a voice recognition program.  Part of this note may be an electronic transcription/translation of spoken language to printed text using the Dragon Dictation System.

## (undated) DEVICE — LN SMPL CO2 SHTRM SD STREAM W/M LUER

## (undated) DEVICE — TREK CORONARY DILATATION CATHETER 2.50 MM X 20 MM / RAPID-EXCHANGE: Brand: TREK

## (undated) DEVICE — LASSO POLYPECTOMY SNARE: Brand: LASSO

## (undated) DEVICE — TR BAND RADIAL ARTERY COMPRESSION DEVICE: Brand: TR BAND

## (undated) DEVICE — HEARTRAIL III GUIDING CATHETER: Brand: HEARTRAIL

## (undated) DEVICE — RUNTHROUGH NS EXTRA FLOPPY PTCA GUIDEWIRE: Brand: RUNTHROUGH

## (undated) DEVICE — BLCK/BITE BLOX W/DENTL/RIM W/STRAP 54F

## (undated) DEVICE — KT ORCA ORCAPOD DISP STRL

## (undated) DEVICE — GW FC FLOP/TP .035 260CM 3MM

## (undated) DEVICE — SNAR POLYP SENSATION STDOVL 27 240 BX40

## (undated) DEVICE — 6F .070 3 DRC 100CM: Brand: VISTA BRITE TIP

## (undated) DEVICE — CANN O2 ETCO2 FITS ALL CONN CO2 SMPL A/ 7IN DISP LF

## (undated) DEVICE — RADIFOCUS GLIDEWIRE: Brand: GLIDEWIRE

## (undated) DEVICE — CATH F6 ST JL 4 100CM: Brand: SUPERTORQUE

## (undated) DEVICE — HI-TORQUE BALANCE MIDDLEWEIGHT UNIVERSAL GUIDE WIRE .014 STRAIGHT TIP 3.0 CM X 190 CM: Brand: HI-TORQUE BALANCE MIDDLEWEIGHT UNIVERSAL

## (undated) DEVICE — THE SINGLE USE ETRAP – POLYP TRAP IS USED FOR SUCTION RETRIEVAL OF ENDOSCOPICALLY REMOVED POLYPS.: Brand: ETRAP

## (undated) DEVICE — ERBE NESSY®PLATE 170 SPLIT; 168CM²; CABLE 3M: Brand: ERBE

## (undated) DEVICE — SENSR O2 OXIMAX FNGR A/ 18IN NONSTR

## (undated) DEVICE — VBT GC 6F .070 JR 3.5 100CM: Brand: VISTA BRITE TIP

## (undated) DEVICE — TUBING, SUCTION, 1/4" X 10', STRAIGHT: Brand: MEDLINE

## (undated) DEVICE — GUIDELINER CATHETERS ARE INTENDED TO BE USED IN CONJUNCTION WITH GUIDE CATHETERS TO ACCESS DISCRETE REGIONS OF THE CORONARY AND/OR PERIPHERAL VASCULATURE, AND TO FACILITATE PLACEMENT OF INTERVENTIONAL DEVICES.: Brand: GUIDELINER® V3 CATHETER

## (undated) DEVICE — CATH F6 ST PIG 155 110CM 6SH: Brand: SUPER TORQUE

## (undated) DEVICE — ADAPT CLN BIOGUARD AIR/H2O DISP

## (undated) DEVICE — GLIDESHEATH SLENDER STAINLESS STEEL KIT: Brand: GLIDESHEATH SLENDER